# Patient Record
Sex: FEMALE | Race: WHITE | Employment: OTHER | URBAN - METROPOLITAN AREA
[De-identification: names, ages, dates, MRNs, and addresses within clinical notes are randomized per-mention and may not be internally consistent; named-entity substitution may affect disease eponyms.]

---

## 2017-02-03 ENCOUNTER — OFFICE VISIT (OUTPATIENT)
Dept: FAMILY MEDICINE | Facility: CLINIC | Age: 67
End: 2017-02-03
Payer: COMMERCIAL

## 2017-02-03 ENCOUNTER — RADIANT APPOINTMENT (OUTPATIENT)
Dept: GENERAL RADIOLOGY | Facility: CLINIC | Age: 67
End: 2017-02-03
Attending: NURSE PRACTITIONER
Payer: COMMERCIAL

## 2017-02-03 VITALS
BODY MASS INDEX: 35.62 KG/M2 | TEMPERATURE: 96.9 F | SYSTOLIC BLOOD PRESSURE: 130 MMHG | WEIGHT: 207.6 LBS | DIASTOLIC BLOOD PRESSURE: 68 MMHG | HEART RATE: 64 BPM

## 2017-02-03 DIAGNOSIS — M25.562 ACUTE PAIN OF LEFT KNEE: ICD-10-CM

## 2017-02-03 DIAGNOSIS — M25.562 ACUTE PAIN OF LEFT KNEE: Primary | ICD-10-CM

## 2017-02-03 PROCEDURE — 99213 OFFICE O/P EST LOW 20 MIN: CPT | Performed by: NURSE PRACTITIONER

## 2017-02-03 PROCEDURE — 73562 X-RAY EXAM OF KNEE 3: CPT | Mod: LT

## 2017-02-03 RX ORDER — MULTIVIT-MIN/IRON/FOLIC ACID/K 18-600-40
CAPSULE ORAL DAILY
COMMUNITY

## 2017-02-03 ASSESSMENT — ENCOUNTER SYMPTOMS
FEVER: 0
LIGHT-HEADEDNESS: 0
SHORTNESS OF BREATH: 0
COUGH: 0
CONSTIPATION: 0
EYE ITCHING: 0
NAUSEA: 0
FATIGUE: 0
RHINORRHEA: 0
EYE DISCHARGE: 0
SORE THROAT: 0
WHEEZING: 0
HEADACHES: 0
DIZZINESS: 0
DIAPHORESIS: 0
SINUS PRESSURE: 0
CHILLS: 0
DIARRHEA: 0
ARTHRALGIAS: 1

## 2017-02-03 NOTE — PATIENT INSTRUCTIONS
Knee Pain  Knee pain is very common. It s especially common in active people who put a lot of pressure on their knees, like runners. It affects women more often than men.  Your kneecap (patella) is a thick, round bone. It covers and protects the front portion of your knee joint. It moves along a groove in your thighbone (femur) as part of the patellofemoral joint. A layer of cartilage surrounds the underside of your kneecap. This layer protects it from grinding against your femur.  When this cartilage softens and breaks down, it can cause knee pain. This is partly because of repetitive stress. The stress irritates the lining of the joint. This causes pain in the underlying bone.  What causes knee pain?  Many things can cause knee pain. You may have more than one cause. Some of these include:    Overuse of the knee joint    The kneecap doesn t line up with the tissue around it    Damage to small nerves in the area    Damage to the ligament-like structure that holds the kneecap in place (retinaculum)    Breakdown of the bone under the cartilage    Swelling in the soft tissues around the kneecap    Injury  You might be more likely to have knee pain if you:    Exercise a lot    Recently increased the intensity of your workouts    Have a body mass index (BMI) greater than 25    Have poor alignment of your kneecap    Walk with your feet turned overly outward or inward    Have weakness in surrounding muscle groups (inner quad or hip adductor muscles)    Have too much tightness in surrounding muscle groups (hamstrings or iliotibial band)    Have a recent history of injury to the area    Are female  Symptoms of knee pain  This type of knee pain is a dull, aching pain in the front of the knee in the area under and around the kneecap. This pain may start quickly or slowly. Your pain might be worse when you squat, run, or sit for a long time. You might also sometimes feel like your knee is giving out. You may have symptoms in  one or both of your knees.  Diagnosing knee pain  Your health care provider will ask about your medical history and your symptoms. Be sure to describe any activities that make your knee pain worse. He or she will look at your knee. This will include tests of your range of motion, strength, and areas of pain of your knee. Your knee alignment will be checked.  Your health care provider will need to rule out other causes of your knee pain, such as arthritis. You may need an imaging test, such as an X-ray or MRI.  Treatment for knee pain  Treatments that can help ease your symptoms may include:    Avoiding activities for a while that make your pain worse, returning to activity over time    Icing the outside of your knee when it causes you pain    Taking over-the-counter pain medicine    Wearing a knee brace or taping your knee to support it    Wearing special shoe inserts to help keep your feet in the proper alignment    Doing special exercises to stretch and strengthen the muscles around your hip and your knee  These steps help most people manage knee pain. But some cases of knee pain need to be treated with surgery. You may need surgery right away. Or you may need it later if other treatments don t work. Your health care provider may refer you to an orthopedic surgeon. He or she will talk with you about your choices.  Preventing knee pain  Losing weight and correcting excess muscle tightness or muscle weakness may help lower your risk.  In some cases, you can prevent knee pain. To help prevent a flare-up of knee pain, you do these things:    Regularly do all the exercises your doctor or physical therapist advises    Support your knee as advised by your doctor or physical therapist    Increase training gradually, and ease up on training when needed    Have an expert check your gait for running or other sporting activities    Stretch properly before and after exercise    Replace your running shoes regularly    Lose  excess weight     When to call your health care provider  Call your health care provider right away if:    Your symptoms don t get better after a few weeks of treatment    You have any new symptoms        4319-4657 [employer]. [address] [place] [address], [place] [address]. All rights reserved. This information is not intended as a substitute for professional medical care. Always follow your healthcare professional's instructions.

## 2017-02-03 NOTE — NURSING NOTE
"Chief Complaint   Patient presents with     Joint Swelling     left knee       Initial /68 mmHg  Pulse 64  Temp(Src) 96.9  F (36.1  C) (Tympanic)  Wt 207 lb 9.6 oz (94.167 kg) Estimated body mass index is 35.62 kg/(m^2) as calculated from the following:    Height as of 10/17/16: 5' 4\" (1.626 m).    Weight as of this encounter: 207 lb 9.6 oz (94.167 kg).  BP completed using cuff size: tasha Morocho CMA      "

## 2017-02-03 NOTE — PROGRESS NOTES
SUBJECTIVE:                                                    Payton Gardiner is a 66 year old female who presents to clinic today for the following health issues:      Left knee pain and swelling for the last 2 weeks. Has been that way all the time. Will feel ok when is walking on treadmill it when goes to turn certain directions on bend knee to put on shoes will hurt. Does hurt to go up and down steps. Did do some ibuprofen and ice and is not sure if helped because was not consistent with it becuase  was very ill. Does hip abductor machine at [facility] and not sure if that has caused the pain. Knee does not feel weak like it could give out, has not gotten locked. No numbness or tingling. No previous injury to this area     Musculoskeletal problem/pain      Duration: 2 weeks    Description  Location: left knee    Intensity:  moderate    Accompanying signs and symptoms: swelling, creeking    History  Previous similar problem: no   Previous evaluation:  none    Precipitating or alleviating factors:  Trauma or overuse: YES- uses treadmill 30 minutes per day  Aggravating factors include: certain positions    Therapies tried and outcome: ibuprofen and ice not effective       Problem list and histories reviewed & adjusted, as indicated.  Additional history: as documented    Current Outpatient Prescriptions   Medication Sig Dispense Refill     ASPIRIN PO Take 81 mg by mouth       vitamin B complex with vitamin C (VITAMIN  B COMPLEX) TABS tablet Take 1 tablet by mouth daily       Cholecalciferol (VITAMIN D) 2000 UNITS CAPS        Calcium Carb-Cholecalciferol (CALCIUM 600 + D PO)        APPLE CIDER VINEGAR PO        Allergies   Allergen Reactions     Hmg-Coa-R Inhibitors      Could hardly walk     Sulfa Drugs Rash       ROS:  Review of Systems   Constitutional: Negative for fever, chills, diaphoresis and fatigue.   HENT: Negative for ear discharge, ear pain, hearing loss, rhinorrhea, sinus pressure and sore throat.     Eyes: Negative for discharge and itching.   Respiratory: Negative for cough, shortness of breath and wheezing.    Gastrointestinal: Negative for nausea, diarrhea and constipation.   Musculoskeletal: Positive for arthralgias (left knee).   Skin: Negative for rash.   Neurological: Negative for dizziness, light-headedness and headaches.         OBJECTIVE:                                                    /68 mmHg  Pulse 64  Temp(Src) 96.9  F (36.1  C) (Tympanic)  Wt 207 lb 9.6 oz (94.167 kg)  Body mass index is 35.62 kg/(m^2).  Physical Exam   Constitutional: She appears well-developed and well-nourished.   Cardiovascular: Normal rate and regular rhythm.    Pulmonary/Chest: Effort normal and breath sounds normal.   Musculoskeletal:        Left knee: She exhibits swelling and effusion. She exhibits normal range of motion, no ecchymosis, no erythema and no bony tenderness. Tenderness found.   Tenderness to posterior left knee.    Neurological: She is alert.   Skin: Skin is warm and dry.         Diagnostic Test Results:  Xray - Left knee     ASSESSMENT/PLAN:                                                      1. Acute pain of left knee  Obtain imaging  Ibuprofen as needed to help  pain  May need referral to ortho in the future.   - XR Knee Left 3 Views; Future        [facility]

## 2017-02-03 NOTE — MR AVS SNAPSHOT
After Visit Summary   2/3/2017    Payton Gardiner    MRN: [id number]           Patient Information     Date Of Birth          1950        Visit Information        Provider Department      2/3/2017 10:00 AM Homa Fish APRN [facility]        Today's Diagnoses     Acute pain of left knee    -  1       Care Instructions      Knee Pain  Knee pain is very common. It s especially common in active people who put a lot of pressure on their knees, like runners. It affects women more often than men.  Your kneecap (patella) is a thick, round bone. It covers and protects the front portion of your knee joint. It moves along a groove in your thighbone (femur) as part of the patellofemoral joint. A layer of cartilage surrounds the underside of your kneecap. This layer protects it from grinding against your femur.  When this cartilage softens and breaks down, it can cause knee pain. This is partly because of repetitive stress. The stress irritates the lining of the joint. This causes pain in the underlying bone.  What causes knee pain?  Many things can cause knee pain. You may have more than one cause. Some of these include:    Overuse of the knee joint    The kneecap doesn t line up with the tissue around it    Damage to small nerves in the area    Damage to the ligament-like structure that holds the kneecap in place (retinaculum)    Breakdown of the bone under the cartilage    Swelling in the soft tissues around the kneecap    Injury  You might be more likely to have knee pain if you:    Exercise a lot    Recently increased the intensity of your workouts    Have a body mass index (BMI) greater than 25    Have poor alignment of your kneecap    Walk with your feet turned overly outward or inward    Have weakness in surrounding muscle groups (inner quad or hip adductor muscles)    Have too much tightness in surrounding muscle groups (hamstrings or iliotibial band)    Have a recent  history of injury to the area    Are female  Symptoms of knee pain  This type of knee pain is a dull, aching pain in the front of the knee in the area under and around the kneecap. This pain may start quickly or slowly. Your pain might be worse when you squat, run, or sit for a long time. You might also sometimes feel like your knee is giving out. You may have symptoms in one or both of your knees.  Diagnosing knee pain  Your health care provider will ask about your medical history and your symptoms. Be sure to describe any activities that make your knee pain worse. He or she will look at your knee. This will include tests of your range of motion, strength, and areas of pain of your knee. Your knee alignment will be checked.  Your health care provider will need to rule out other causes of your knee pain, such as arthritis. You may need an imaging test, such as an X-ray or MRI.  Treatment for knee pain  Treatments that can help ease your symptoms may include:    Avoiding activities for a while that make your pain worse, returning to activity over time    Icing the outside of your knee when it causes you pain    Taking over-the-counter pain medicine    Wearing a knee brace or taping your knee to support it    Wearing special shoe inserts to help keep your feet in the proper alignment    Doing special exercises to stretch and strengthen the muscles around your hip and your knee  These steps help most people manage knee pain. But some cases of knee pain need to be treated with surgery. You may need surgery right away. Or you may need it later if other treatments don t work. Your health care provider may refer you to an orthopedic surgeon. He or she will talk with you about your choices.  Preventing knee pain  Losing weight and correcting excess muscle tightness or muscle weakness may help lower your risk.  In some cases, you can prevent knee pain. To help prevent a flare-up of knee pain, you do these  things:    Regularly do all the exercises your doctor or physical therapist advises    Support your knee as advised by your doctor or physical therapist    Increase training gradually, and ease up on training when needed    Have an expert check your gait for running or other sporting activities    Stretch properly before and after exercise    Replace your running shoes regularly    Lose excess weight     When to call your health care provider  Call your health care provider right away if:    Your symptoms don t get better after a few weeks of treatment    You have any new symptoms        [id number] [employer]. [address] [place] [address]. All rights reserved. This information is not intended as a substitute for professional medical care. Always follow your healthcare professional's instructions.              Follow-ups after your visit        Your next 10 appointments already scheduled     Apr 24, 2017 10:15 AM   [place] SCREENING DIGITAL BILATERAL with [id number]   [facility] ([facility])    [address] [place] [id number] [phone]           Do not use any powder, lotion or deodorant under your arms or on your breast. If you do, we will ask you to remove it before your exam.  Wear comfortable, two-piece clothing.  If you have any allergies, tell your care team.  Bring any previous mammograms from other facilities or have them mailed to the breast center.            May 01, 2017  8:20 AM   PHYSICAL with Araseli Frazier,    [facility]    [address] [facility] [address] [phone]              Future tests that were ordered for you today     Open Future Orders        Priority Expected Expires Ordered    XR Knee Left 3 Views Routine 2/3/2017 2/3/2018 2/3/2017            Who to contact     Normal or non-critical lab and imaging results will be communicated to you by MyChart, letter or phone  within 4 business days after the clinic has received the results. If you do not hear from us within 7 days, please contact the clinic through [facility] or phone. If you have a critical or abnormal lab result, we will notify you by phone as soon as possible.  Submit refill requests through L[facility]N or call your pharmacy and they will forward the refill request to us. Please allow 3 business days for your refill to be completed.          If you need to speak with a  for additional information , please call: [phone]           Additional Information About Your Visit        LYYN Information     [facility]YN gives you secure access to your electronic health record. If you see a primary care provider, you can also send messages to your care team and make appointments. If you have questions, please call your primary care clinic.  If you do not have a primary care provider, please call [phone] and they will assist you.        Care EveryWhere ID     This is your Care EveryWhere ID. This could be used by other organizations to access your [facility] medical records  [id number]        Your Vitals Were     Pulse Temperature                64 96.9  F (36.1  C) (Tympanic)           Blood Pressure from Last 3 Encounters:   02/03/17 130/68   10/17/16 124/88    Weight from Last 3 Encounters:   02/03/17 207 lb 9.6 oz (94.167 kg)   10/17/16 199 lb (90.266 kg)               Primary Care Provider    None Specified       No primary provider on file.        Thank you!     Thank you for choosing [facility]  for your care. Our goal is always to provide you with excellent care. Hearing back from our patients is one way we can continue to improve our services. Please take a few minutes to complete the written survey that you may receive in the mail after your visit with us. Thank you!             Your Updated Medication List - Protect others around you: Learn how to safely use, store and throw away your  medicines at www.disposemymeds.org.          This list is accurate as of: 2/3/17 10:24 AM.  Always use your most recent med list.                   Brand Name Dispense Instructions for use    APPLE CIDER VINEGAR PO          ASPIRIN PO      Take 81 mg by mouth       CALCIUM 600 + D PO          vitamin B complex with vitamin C Tabs tablet      Take 1 tablet by mouth daily       vitamin D 2000 UNITS Caps

## 2017-02-13 DIAGNOSIS — M25.562 ACUTE PAIN OF LEFT KNEE: Primary | ICD-10-CM

## 2017-02-15 DIAGNOSIS — L63.9 ALOPECIA AREATA: ICD-10-CM

## 2017-02-15 DIAGNOSIS — R92.8 ABNORMAL MAMMOGRAM: ICD-10-CM

## 2017-02-15 DIAGNOSIS — M22.2X9 PATELLOFEMORAL PAIN SYNDROME, UNSPECIFIED LATERALITY: ICD-10-CM

## 2017-02-15 DIAGNOSIS — S63.509A SPRAIN OF WRIST: ICD-10-CM

## 2017-02-15 PROBLEM — N60.02 BREAST CYST, LEFT: Status: ACTIVE | Noted: 2017-02-15

## 2017-02-15 PROBLEM — H26.9 CATARACTS, BILATERAL: Status: ACTIVE | Noted: 2017-02-15

## 2017-02-15 NOTE — PROGRESS NOTES
Received records form [facility] via fax.    Please abstract the following data from this visit with this patient into the appropriate field in Epic:    Colonoscopy done on this date: 8/3/2016 (approximately), by this group: [facility], results were incomplete-could not safely advance beyond sigmoid. 8/4/16-[facility]. Tortuous colon.  External and internal hemorrhoids.  The exam was otherwise normal.  No specimens collected. Repeat colonoscopy in 5 years for screening.     Mammogram done on this date: 4/22/2016 (approximately), by this group: [facility], results were There are scattered areas of fibroglandular density. Findings: No suspicious masses, architectural distortion, skin thickening, nipple retraction, or suspicious calcifications are appreciated. Impression:  No suspicious radiographic findings, Routine follow up recommended with mammogram in 1 year.       Eye exam with ophthalmology on this date: 7/21/16      Ultrasound Carotid Arteries Bilateral: Date 4/28/16.  Findings:  Peak systolic velocity within the internal carotid artery on the right and left is 73 and 70 cm/sec respectively.  Peak systolic velocity within the common carotid artery on the right and left is 79 and 80 cm/sec respectively.  The ratio using peak systolic velocity in the ICA/CCA is 1.0 on th right and 0.9 on the left.  No calcified plaque of significance is seen. Antegrade flow in the vertebral arteries is present.  Impression: The degree of stenosis within the internal arteries is less than 50% bilaterally.    Lab work 6/6/16  WBC 8.9 K/uL  RBC 4.43 M/uL  HGB 13.7 g/dL  HCT 40.4%  MCV 91.2 fL  MCH 30.9 pg  MCHC 33.9 g/dL  RDW 12.7 %  Platelets 249 K/uL  Soidum 139 mEq/L  Potassium 3.8 mEq/L  Chloride 104 mEq/L  C02 29 mEq/L  Glucose 105 mg/dL  BUN 19 mg/dL  Creatinine 0.63 mg/dl  Calcium 9.5 mg/dL    Allergies, problem list and histories have been added.      Records have been  placed into scans.    Belgica Morocho CMA

## 2017-02-17 ENCOUNTER — OFFICE VISIT (OUTPATIENT)
Dept: ORTHOPEDICS | Facility: CLINIC | Age: 67
End: 2017-02-17
Payer: COMMERCIAL

## 2017-02-17 ENCOUNTER — RADIANT APPOINTMENT (OUTPATIENT)
Dept: MRI IMAGING | Facility: CLINIC | Age: 67
End: 2017-02-17
Attending: FAMILY MEDICINE
Payer: COMMERCIAL

## 2017-02-17 VITALS
BODY MASS INDEX: 35.44 KG/M2 | SYSTOLIC BLOOD PRESSURE: 153 MMHG | WEIGHT: 207.6 LBS | HEART RATE: 119 BPM | DIASTOLIC BLOOD PRESSURE: 72 MMHG | HEIGHT: 64 IN

## 2017-02-17 DIAGNOSIS — M23.92 LOCKING OF LEFT KNEE: ICD-10-CM

## 2017-02-17 DIAGNOSIS — M25.562 ACUTE PAIN OF LEFT KNEE: ICD-10-CM

## 2017-02-17 DIAGNOSIS — M25.562 ACUTE PAIN OF LEFT KNEE: Primary | ICD-10-CM

## 2017-02-17 PROCEDURE — 73721 MRI JNT OF LWR EXTRE W/O DYE: CPT | Mod: TC

## 2017-02-17 PROCEDURE — 99203 OFFICE O/P NEW LOW 30 MIN: CPT | Performed by: FAMILY MEDICINE

## 2017-02-17 RX ORDER — LORAZEPAM 0.5 MG/1
0.25-0.5 TABLET ORAL PRN
Qty: 4 TABLET | Refills: 0 | Status: SHIPPED | OUTPATIENT
Start: 2017-02-17 | End: 2017-05-01

## 2017-02-17 NOTE — MR AVS SNAPSHOT
After Visit Summary   2017    Payton Gardiner    MRN: [id number]           Patient Information     Date Of Birth          1950        Visit Information        Provider Department      2017 10:40 AM Luiz Acevedo,  [facility]        Today's Diagnoses     Acute pain of left knee    -  1    Locking of left knee          Care Instructions    Plan:  Prescription Medication as directed: Lorazepam 30 minutes prior to MRI  MRI of the left knee  Follow up: after MRI    If you have any further questions for your physician or physician s care team you can call [phone] and use option 3 to leave a voice message. Calls received during business hours will be returned same day.    Advanced imaging is done by appointment in the imaging center.  Depending on your availability you can usually schedule within the next 1-2 days. Some insurance require a prior authorization to be completed which may delay the time until you are able to schedule your appointment.  Please call [facility] [phone] to schedule your MRI.     Please make a follow up appointment in the clinic at least 2 days following your MRI by calling [phone].          Follow-ups after your visit        Your next 10 appointments already scheduled     2017 10:15 AM CDT   MA SCREENING DIGITAL BILATERAL with [address] ([facility])    [address] [facility] [place] [address] [phone]           Do not use any powder, lotion or deodorant under your arms or on your breast. If you do, we will ask you to remove it before your exam.  Wear comfortable, two-piece clothing.  If you have any allergies, tell your care team.  Bring any previous mammograms from other facilities or have them mailed to the breast center.            May 01, 2017  8:20 AM CDT   PHYSICAL with Araseli Frazier,    [facility]  [place]    [address] [facility] [address]   [phone]              Future tests that were ordered for you today     Open Future Orders        Priority Expected Expires Ordered    MR Knee Left w/o Contrast Routine  2/17/2018 2/17/2017            Who to contact     If you have questions or need follow up information about today's clinic visit or your schedule please contact [facility] directly at [phone].  Normal or non-critical lab and imaging results will be communicated to you by Dwllrhart, letter or phone within 4 business days after the clinic has received the results. If you do not hear from us within 7 days, please contact the clinic through Quixbyt or phone. If you have a critical or abnormal lab result, we will notify you by phone as soon as possible.  Submit refill requests through RSens or call your pharmacy and they will forward the refill request to us. Please allow 3 business days for your refill to be completed.          Additional Information About Your Visit        [employer] Information     RSens gives you secure access to your electronic health record. If you see a primary care provider, you can also send messages to your care team and make appointments. If you have questions, please call your primary care clinic.  If you do not have a primary care provider, please call [phone] and they will assist you.        Care EveryWhere ID     This is your Care EveryWhere ID. This could be used by other organizations to access your [facility] medical records  [id number]        Your Vitals Were     Pulse Height BMI (Body Mass Index)             119 5' 4\" (1.626 m) 35.63 kg/m2          Blood Pressure from Last 3 Encounters:   02/17/17 153/72   02/03/17 130/68   10/17/16 124/88    Weight from Last 3 Encounters:   02/17/17 207 lb 9.6 oz (94.2 kg)   02/03/17 207 lb 9.6 oz (94.2 kg)   10/17/16 199 lb (90.3 kg)                 Today's Medication " Changes          These changes are accurate as of: 2/17/17 11:49 AM.  If you have any questions, ask your nurse or doctor.               Start taking these medicines.        Dose/Directions    LORazepam 0.5 MG tablet   Commonly known as:  ATIVAN   Used for:  Acute pain of left knee, Locking of left knee   Started by:  Luiz Acevedo,         Dose:  0.25-0.5 mg   Take 0.5-1 tablets (0.25-0.5 mg) by mouth as needed for anxiety Take 30 minutes prior to procedure.  Do not operate a vehicle after taking this medication   Quantity:  4 tablet   Refills:  0            Where to get your medicines      Some of these will need a paper prescription and others can be bought over the counter.  Ask your nurse if you have questions.     Bring a paper prescription for each of these medications     LORazepam 0.5 MG tablet                Primary Care Provider    None Specified       No primary provider on file.        Thank you!     Thank you for choosing [facility]  for your care. Our goal is always to provide you with excellent care. Hearing back from our patients is one way we can continue to improve our services. Please take a few minutes to complete the written survey that you may receive in the mail after your visit with us. Thank you!             Your Updated Medication List - Protect others around you: Learn how to safely use, store and throw away your medicines at www.disposemymeds.org.          This list is accurate as of: 2/17/17 11:49 AM.  Always use your most recent med list.                   Brand Name Dispense Instructions for use    APPLE CIDER VINEGAR PO          ASPIRIN PO      Take 81 mg by mouth       CALCIUM 600 + D PO          LORazepam 0.5 MG tablet    ATIVAN    4 tablet    Take 0.5-1 tablets (0.25-0.5 mg) by mouth as needed for anxiety Take 30 minutes prior to procedure.  Do not operate a vehicle after taking this medication       vitamin B complex with vitamin C Tabs  tablet      Take 1 tablet by mouth daily       vitamin D 2000 UNITS Caps

## 2017-02-17 NOTE — PROGRESS NOTES
"Payton Gardiner  :  1950  DOS: 2017  MRN: [id number]    Sports Medicine Clinic Visit    PCP: No primary care provider on file.    Payton Gardiner is a 67 year old female who is seen in consultation at the request of Homa Fish presenting with left knee pain    Injury: 3-4 week(s).  Pain located over posterior knee, thigh and calf, radiating to posterior thigh and calf.  Additional Features:  Positive: swelling.  Symptoms are better with Standing.  Symptoms are worse with: everything else: walking, sitting, exercise, turning, putting on shoes.  Other evaluation and/or treatments so far consists of: Ice and Ibuprofen.  Recent imaging completed: X-rays completed 2/3/16.  Prior History of related problems: none    Social History: retired    Review of Systems  Musculoskeletal: as above  Remainder of review of systems is negative including constitutional, CV, pulmonary, GI, Skin and Neurologic except as noted in HPI or medical history.    History reviewed. No pertinent past medical history.  Past Surgical History   Procedure Laterality Date     Hysterectomy       took ovaries and cervix as well, removed due to cyst (benign)     Cataract iol, rt/lt       Colonoscopy  2016     incomplete     Colonoscopy  2016     Dexa - him scan  2015     Excise breast cyst/fibroadenoma/tumor/duct lesion/nipple lesion/areolar lesion Left 2001     left breast cyst aspiration       Objective  /72  Pulse 119  Ht 5' 4\" (1.626 m)  Wt 207 lb 9.6 oz (94.2 kg)  BMI 35.63 kg/m2    General: healthy, alert and in no distress    HEENT: no scleral icterus or conjunctival erythema   Skin: no suspicious lesions or rash. No jaundice.   CV: regular rhythm by palpation, 2+ distal pulses, no pedal edema    Resp: normal respiratory effort without conversational dyspnea   Psych: normal mood and affect    Gait: antalgic, appropriate coordination and balance, using assistive devie  Neuro: normal light touch sensory exam " of the extremities. Motor strength as noted below     Right Knee exam    ROM:        Flexion 110 degrees       Extension -2 degrees       Range of motion limited by pain, mechanical    Inspection:       no visible ecchymosis        effusion noted trace    Skin:       no visible deformities       well perfused       capillary refill brisk    Patellar Motion:        Crepitus noted in the patellofemoral joint    Tender:        medial patellar border       lateral patellar border       medial joint line       lateral joint line    Non Tender:         remainder of knee area        along MCL        distal IT Band        infrapatellar tendon        tibial tubercle       pes anserine bursa    Special Tests:        neg (-) varus at 0 deg and 30 deg       neg (-) valgus at 0 deg and 30 deg    Evaluation of ipsilateral kinetic chain       decreased strength with resisted abduction of the right hip       decreased strength with resisted extension of the right hip       B/l decreased quad tone and core deconditioning      Radiology  LEFT KNEE THREE VIEWS 2/3/2017 10:55 AM      HISTORY: Pain in left knee.     COMPARISON: None.     FINDINGS: Very minimal hypertrophic changes medial compartment left  knee as well as at the patellofemoral articulation. Vascular  calcification. Tiny cortical sclerotic lesion distal femur anteriorly  of doubtful significance.    Assessment:  1. Acute pain of left knee    2. Locking of left knee        Plan:  Discussed the assessment with the patient.  Follow up: will contact with MR results  Would normally offer CSI for her pain  However, given very limited ROM and mechanical sx, will eval for unstable meniscus  Only mild OA apparent on XR, so arthroscopy may be reasonable for unstable meniscus issue  No baker's cyst of knee  Bedside US shows pes anserine bursitis, mild knee effusion  Will ensure stability of knee before pursuing injection options  WBAT for now, ESTEBAN reviewed, assistive device use  reviewed  PT avalable in the future as well  We discussed modified progressive pain-free activity as tolerated  Home handouts provided and supportive care reviewed  All questions were answered today  Contact us with additional questions or concerns  Signs and sx of concern reviewed    Thanks very much for sending this nice lady to us, I will keep you updated with her progress      Luiz Acevedo DO, CAQ  Primary Care Sports Medicine  [facility]       Disclaimer: This note consists of symbols derived from keyboarding, dictation and/or voice recognition software. As a result, there may be errors in the script that have gone undetected. Please consider this when interpreting information found in this chart.

## 2017-02-17 NOTE — PATIENT INSTRUCTIONS
Plan:  Prescription Medication as directed: Lorazepam 30 minutes prior to MRI  MRI of the left knee  Follow up: after MRI    If you have any further questions for your physician or physician s care team you can call [phone] and use option 3 to leave a voice message. Calls received during business hours will be returned same day.    Advanced imaging is done by appointment in the imaging center.  Depending on your availability you can usually schedule within the next 1-2 days. Some insurance require a prior authorization to be completed which may delay the time until you are able to schedule your appointment.  Please call [facility] [phone] to schedule your MRI.     Please make a follow up appointment in the clinic at least 2 days following your MRI by calling [phone].

## 2017-02-17 NOTE — NURSING NOTE
"Chief Complaint   Patient presents with     Knee left       Initial /72  Pulse 119  Ht 5' 4\" (1.626 m)  Wt 207 lb 9.6 oz (94.2 kg)  BMI 35.63 kg/m2 Estimated body mass index is 35.63 kg/(m^2) as calculated from the following:    Height as of this encounter: 5' 4\" (1.626 m).    Weight as of this encounter: 207 lb 9.6 oz (94.2 kg).  Medication Reconciliation: complete    "

## 2017-02-20 ENCOUNTER — MYC MEDICAL ADVICE (OUTPATIENT)
Dept: ORTHOPEDICS | Facility: CLINIC | Age: 67
End: 2017-02-20

## 2017-02-20 NOTE — TELEPHONE ENCOUNTER
Discussed MR results.  Doing better overall. Reassuring MR from standpoint of internal derangement and stability.  OA/chondromalacia reviewed.  Can offer CSI vs PT for labile or worsening sx.  Call to discuss any time prn.  All questions asnwered today.

## 2017-02-20 NOTE — TELEPHONE ENCOUNTER
MR KNEE LEFT WITHOUT CONTRAST 2/17/2017 12:45 PM     HISTORY: Pain in left knee. Unspecified internal derangement of left  knee.     TECHNIQUE: Axial and coronal T2 with fat suppression. Coronal T1.  Sagittal dual echo T2.     FINDINGS:   Medial Meniscus: Asymmetric intrasubstance degeneration of the medial  meniscus with no definite articular surface tear identified, using  strict criteria.      Lateral Meniscus: No tear, displaced fragment, or extrusion.      Anterior Cruciate Ligament: Intact.      Posterior Cruciate Ligament: Intact.      Medial Collateral Ligament: Intact.     Lateral Collateral Ligament Complex, Popliteus Tendon: The iliotibial  band, fibular collateral ligament, biceps femoris tendon, and  popliteus tendon are intact.     Osseous and Cartilaginous Structures: Mild resorptive change adjacent  to the tibial spine region. There is chondromalacia as follows: Grade  2 lateral femoral condyle, at least grade 1 lateral and medial tibial  plateau, patellofemoral with grade 4 involvement.     Extensor Mechanism: The quadriceps and patellar tendons are intact.  The medial and lateral patellar retinacula appear unremarkable.     Joint Space: Mild-moderate joint effusion. No definite loose bodies  appreciated.     Additional Findings: No Baker's cyst. Mild semimembranosus-tibial  collateral ligament bursitis. There is also some focal fluid deep to  the pes anserine tendons at the posteromedial aspect of the knee,  suspicious for pes anserine bursitis.         IMPRESSION:  1. Three-compartment chondromalacia.  2. Mild-moderate effusion.  3. Posteromedial bursitis.

## 2017-03-15 ENCOUNTER — OFFICE VISIT (OUTPATIENT)
Dept: ORTHOPEDICS | Facility: CLINIC | Age: 67
End: 2017-03-15
Payer: COMMERCIAL

## 2017-03-15 VITALS
SYSTOLIC BLOOD PRESSURE: 133 MMHG | HEART RATE: 69 BPM | WEIGHT: 203.4 LBS | DIASTOLIC BLOOD PRESSURE: 67 MMHG | HEIGHT: 64 IN | BODY MASS INDEX: 34.72 KG/M2

## 2017-03-15 DIAGNOSIS — M17.11 PRIMARY OSTEOARTHRITIS OF RIGHT KNEE: ICD-10-CM

## 2017-03-15 DIAGNOSIS — M25.469 EFFUSION OF LOWER LEG JOINT: ICD-10-CM

## 2017-03-15 DIAGNOSIS — M25.562 ACUTE PAIN OF LEFT KNEE: Primary | ICD-10-CM

## 2017-03-15 PROCEDURE — 99213 OFFICE O/P EST LOW 20 MIN: CPT | Mod: 25 | Performed by: FAMILY MEDICINE

## 2017-03-15 PROCEDURE — 20611 DRAIN/INJ JOINT/BURSA W/US: CPT | Mod: RT | Performed by: FAMILY MEDICINE

## 2017-03-15 RX ORDER — TRIAMCINOLONE ACETONIDE 40 MG/ML
40 INJECTION, SUSPENSION INTRA-ARTICULAR; INTRAMUSCULAR ONCE
Qty: 1 ML | Refills: 0 | OUTPATIENT
Start: 2017-03-15 | End: 2017-03-15

## 2017-03-15 NOTE — PROGRESS NOTES
"Payton Gardiner  :  1950  DOS: 3/15/2017  MRN: [id number]    Sports Medicine Clinic Visit  Interim History - March 15, 2017  Since last visit on 2017 patient has had significant improvement. She has significant fluid on the knee. She is not getting much relief with ice, she has stopped the ibuprofen. Increased aching with standing, activity, and being on her feet for long periods of time. No interim injury.         Initial Visit: Payton Gardiner is a 67 year old female who is seen in consultation at the request of Homa Fish presenting with left knee pain    Injury: 3-4 week(s).  Pain located over posterior knee, thigh and calf, radiating to posterior thigh and calf.  Additional Features:  Positive: swelling.  Symptoms are better with Standing.  Symptoms are worse with: everything else: walking, sitting, exercise, turning, putting on shoes.  Other evaluation and/or treatments so far consists of: Ice and Ibuprofen.  Recent imaging completed: X-rays completed 2/3/16.  Prior History of related problems: none    Social History: retired    Review of Systems  Musculoskeletal: as above  Remainder of review of systems is negative including constitutional, CV, pulmonary, GI, Skin and Neurologic except as noted in HPI or medical history.    No past medical history on file.  Past Surgical History   Procedure Laterality Date     Hysterectomy       took ovaries and cervix as well, removed due to cyst (benign)     Cataract iol, rt/lt       Colonoscopy  2016     incomplete     Colonoscopy  2016     Dexa - him scan  2015     Excise breast cyst/fibroadenoma/tumor/duct lesion/nipple lesion/areolar lesion Left 2001     left breast cyst aspiration       Objective  /67  Pulse 69  Ht 5' 4\" (1.626 m)  Wt 203 lb 6.4 oz (92.3 kg)  BMI 34.91 kg/m2    General: healthy, alert and in no distress    HEENT: no scleral icterus or conjunctival erythema   Skin: no suspicious lesions or rash. No jaundice. "   CV: regular rhythm by palpation, 2+ distal pulses, no pedal edema    Resp: normal respiratory effort without conversational dyspnea   Psych: normal mood and affect    Gait: antalgic, appropriate coordination and balance, using assistive devie  Neuro: normal light touch sensory exam of the extremities. Motor strength as noted below     Right Knee exam    ROM:        Flexion 140 degrees       Extension 0 degrees       Range of motion limited by pain, mechanical    Inspection:       no visible ecchymosis        effusion noted small    Skin:       no visible deformities       well perfused       capillary refill brisk    Patellar Motion:        Crepitus noted in the patellofemoral joint    Tender:        medial patellar border       lateral patellar border       medial joint line       lateral joint line      All improved    Non Tender:         remainder of knee area        along MCL        distal IT Band        infrapatellar tendon        tibial tubercle       pes anserine bursa    Special Tests:        neg (-) varus at 0 deg and 30 deg       neg (-) valgus at 0 deg and 30 deg       + PF grind    Evaluation of ipsilateral kinetic chain       decreased strength with resisted abduction of the right hip       decreased strength with resisted extension of the right hip       B/l decreased quad tone and core deconditioning    Sports Medicine Clinic Procedure  Ultrasound Guided Right Intra-Articular Knee Aspiration & Injection    Technique: The risks of the procedure were explained to the patient.  A consent was signed for the intra-articular knee procedure.  The patient was evaluated with a [employer] ultrasound machine using a 12 MHz linear probe.     The Right knee was prepped and draped in a sterile manner.  Ultrasound identification of the patella, suprapatellar pouch, quadriceps tendon and femur in both long and short axis. The probe was placed in short axis to the Right femur.  A 1.5 inch 18 gauge needle was placed  under ultrasound guidance into the superior knee joint.  15 ml of clear yellow colored fluid was aspirated.   A mixture of 2 ml's 1% lidocaine, 2ml's 0.5% marcaine, and 1 ml kenalog (40mg/ml) was injected without difficulty. The needle was removed and there was good hemostasis without complications.  There was ultrasound documentation of needle placement and injection.  Pre-procedural pain 5/10.  Post procedural pain 2/10.      Radiology  LEFT KNEE THREE VIEWS 2/3/2017 10:55 AM      HISTORY: Pain in left knee.     COMPARISON: None.     FINDINGS: Very minimal hypertrophic changes medial compartment left  knee as well as at the patellofemoral articulation. Vascular  calcification. Tiny cortical sclerotic lesion distal femur anteriorly  of doubtful significance.    Assessment:  1. Acute pain of left knee    2. Primary osteoarthritis of right knee    3. Effusion of lower leg joint        Plan:  Discussed the assessment with the patient.  Follow up: prn  Reviewed MR results  Aspiration and injection today to help with facilitating further clinical progress  Improved ROM and pain, but still with moderate pain and effusion  Improved pes anserine bursitis  PT avalable in the future as well  Reviewed wt loss, activity modification and progressive increase in activity as tolerated and guided by pain  Reviewed options for potential steroid vs viscosupplementation injections and the possibility for future orthopedic referral prn  Reviewed safe and appropriate OTC medication choices, try tylenol first  Up to 3000mg daily of tylenol is generally safe, NSAID dosing and duration limitations reviewed  Discussed nature of degenerative arthrosis of the knee.   Discussed symptom treatment with ice or heat, topical treatments, and rest if needed.   Expectations and goals of CSI reviewed  Often 2-3 days for steroid effect, and can take up to two weeks for maximum effect  We discussed modified progressive pain-free activity as  tolerated  Do not overuse in first two weeks if feeling better due to concern for vulnerability while steroid is working  Supportive care reviewed  All questions were answered today  Contact us with additional questions or concerns  Signs and sx of concern reviewed      Luiz Acevedo DO, RYAN  Primary Care Sports Medicine  [facility]       Disclaimer: This note consists of symbols derived from keyboarding, dictation and/or voice recognition software. As a result, there may be errors in the script that have gone undetected. Please consider this when interpreting information found in this chart.

## 2017-03-15 NOTE — MR AVS SNAPSHOT
After Visit Summary   3/15/2017    Payton Gardiner    MRN: [id number]           Patient Information     Date Of Birth          1950        Visit Information        Provider Department      3/15/2017 9:00 AM Luiz Acevedo,  [facility]        Today's Diagnoses     Acute pain of left knee    -  1    Primary osteoarthritis of right knee        Effusion of lower leg joint           Follow-ups after your visit        Your next 10 appointments already scheduled     Apr 24, 2017 10:15 AM CDT   MA SCREENING DIGITAL BILATERAL with WYMA2   [facility] ([facility])    [address] [id number] [phone]           Do not use any powder, lotion or deodorant under your arms or on your breast. If you do, we will ask you to remove it before your exam.  Wear comfortable, two-piece clothing.  If you have any allergies, tell your care team.  Bring any previous mammograms from other facilities or have them mailed to the breast center.            May 01, 2017  8:20 AM CDT   PHYSICAL with Araseli Frazier DO   [facility]    [address] [address] [phone]              Who to contact     If you have questions or need follow up information about today's clinic visit or your schedule please contact [facility] directly at [phone].  Normal or non-critical lab and imaging results will be communicated to you by MyChart, letter or phone within 4 business days after the clinic has received the results. If you do not hear from us within 7 days, please contact the clinic through MyChart or phone. If you have a critical or abnormal lab result, we will notify you by phone as soon as possible.  Submit refill requests through Propel or call your pharmacy and they will forward the refill request to us. Please allow 3 business days for  "your refill to be completed.          Additional Information About Your Visit        Feedskyhart Information     [employer] gives you secure access to your electronic health record. If you see a primary care provider, you can also send messages to your care team and make appointments. If you have questions, please call your primary care clinic.  If you do not have a primary care provider, please call [phone] and they will assist you.        Care EveryWhere ID     This is your Care EveryWhere ID. This could be used by other organizations to access your [facility] medical records  [id number]        Your Vitals Were     Pulse Height BMI (Body Mass Index)             69 5' 4\" (1.626 m) 34.91 kg/m2          Blood Pressure from Last 3 Encounters:   03/15/17 133/67   02/17/17 153/72   02/03/17 130/68    Weight from Last 3 Encounters:   03/15/17 203 lb 6.4 oz (92.3 kg)   02/17/17 207 lb 9.6 oz (94.2 kg)   02/03/17 207 lb 9.6 oz (94.2 kg)              We Performed the Following     HC ARTHROCENTESIS ASPIR&/INJ MAJOR JT/BURSA W/US     TRIAMCINOLONE ACET INJ NOS          Today's Medication Changes          These changes are accurate as of: 3/15/17  9:32 AM.  If you have any questions, ask your nurse or doctor.               Start taking these medicines.        Dose/Directions    triamcinolone acetonide 40 MG/ML injection   Commonly known as:  KENALOG   Used for:  Acute pain of left knee, Primary osteoarthritis of right knee, Effusion of lower leg joint        Dose:  40 mg   1 mL (40 mg) by INTRA-ARTICULAR route once for 1 dose   Quantity:  1 mL   Refills:  0            Where to get your medicines      Some of these will need a paper prescription and others can be bought over the counter.  Ask your nurse if you have questions.     You don't need a prescription for these medications     triamcinolone acetonide 40 MG/ML injection                Primary Care Provider    None Specified       No primary provider on file.        Thank " you!     Thank you for choosing [facility]  for your care. Our goal is always to provide you with excellent care. Hearing back from our patients is one way we can continue to improve our services. Please take a few minutes to complete the written survey that you may receive in the mail after your visit with us. Thank you!             Your Updated Medication List - Protect others around you: Learn how to safely use, store and throw away your medicines at www.disposemymeds.org.          This list is accurate as of: 3/15/17  9:32 AM.  Always use your most recent med list.                   Brand Name Dispense Instructions for use    APPLE CIDER VINEGAR PO          ASPIRIN PO      Take 81 mg by mouth       CALCIUM 600 + D PO          LORazepam 0.5 MG tablet    ATIVAN    4 tablet    Take 0.5-1 tablets (0.25-0.5 mg) by mouth as needed for anxiety Take 30 minutes prior to procedure.  Do not operate a vehicle after taking this medication       triamcinolone acetonide 40 MG/ML injection    KENALOG    1 mL    1 mL (40 mg) by INTRA-ARTICULAR route once for 1 dose       vitamin B complex with vitamin C Tabs tablet      Take 1 tablet by mouth daily       vitamin D 2000 UNITS Caps

## 2017-03-15 NOTE — NURSING NOTE
"Chief Complaint   Patient presents with     Knee left       Initial Ht 5' 4\" (1.626 m)  Wt 203 lb 6.4 oz (92.3 kg)  BMI 34.91 kg/m2 Estimated body mass index is 34.91 kg/(m^2) as calculated from the following:    Height as of this encounter: 5' 4\" (1.626 m).    Weight as of this encounter: 203 lb 6.4 oz (92.3 kg).  Medication Reconciliation: complete     Anne Marie Jules M.Ed., ATC      "

## 2017-05-01 ENCOUNTER — OFFICE VISIT (OUTPATIENT)
Dept: FAMILY MEDICINE | Facility: CLINIC | Age: 67
End: 2017-05-01
Payer: COMMERCIAL

## 2017-05-01 VITALS
WEIGHT: 200.8 LBS | SYSTOLIC BLOOD PRESSURE: 132 MMHG | DIASTOLIC BLOOD PRESSURE: 78 MMHG | HEART RATE: 80 BPM | TEMPERATURE: 97.6 F | HEIGHT: 64 IN | BODY MASS INDEX: 34.28 KG/M2

## 2017-05-01 DIAGNOSIS — Z13.29 SCREENING FOR THYROID DISORDER: ICD-10-CM

## 2017-05-01 DIAGNOSIS — E78.5 HYPERLIPIDEMIA, UNSPECIFIED HYPERLIPIDEMIA TYPE: ICD-10-CM

## 2017-05-01 DIAGNOSIS — Z00.00 MEDICARE ANNUAL WELLNESS VISIT, SUBSEQUENT: Primary | ICD-10-CM

## 2017-05-01 DIAGNOSIS — Z12.31 ENCOUNTER FOR SCREENING MAMMOGRAM FOR BREAST CANCER: ICD-10-CM

## 2017-05-01 DIAGNOSIS — M81.0 OSTEOPOROSIS: ICD-10-CM

## 2017-05-01 DIAGNOSIS — R73.09 ABNORMAL GLUCOSE: ICD-10-CM

## 2017-05-01 LAB
ANION GAP SERPL CALCULATED.3IONS-SCNC: 7 MMOL/L (ref 3–14)
BUN SERPL-MCNC: 20 MG/DL (ref 7–30)
CALCIUM SERPL-MCNC: 9.3 MG/DL (ref 8.5–10.1)
CHLORIDE SERPL-SCNC: 104 MMOL/L (ref 94–109)
CHOLEST SERPL-MCNC: 243 MG/DL
CO2 SERPL-SCNC: 28 MMOL/L (ref 20–32)
CREAT SERPL-MCNC: 0.78 MG/DL (ref 0.52–1.04)
GFR SERPL CREATININE-BSD FRML MDRD: 73 ML/MIN/1.7M2
GLUCOSE SERPL-MCNC: 92 MG/DL (ref 70–99)
HBA1C MFR BLD: 5.2 % (ref 4.3–6)
HDLC SERPL-MCNC: 87 MG/DL
LDLC SERPL CALC-MCNC: 133 MG/DL
NONHDLC SERPL-MCNC: 156 MG/DL
POTASSIUM SERPL-SCNC: 4 MMOL/L (ref 3.4–5.3)
SODIUM SERPL-SCNC: 139 MMOL/L (ref 133–144)
TRIGL SERPL-MCNC: 113 MG/DL
TSH SERPL DL<=0.005 MIU/L-ACNC: 2.85 MU/L (ref 0.4–4)

## 2017-05-01 PROCEDURE — 80048 BASIC METABOLIC PNL TOTAL CA: CPT | Performed by: FAMILY MEDICINE

## 2017-05-01 PROCEDURE — 99397 PER PM REEVAL EST PAT 65+ YR: CPT | Performed by: FAMILY MEDICINE

## 2017-05-01 PROCEDURE — 83036 HEMOGLOBIN GLYCOSYLATED A1C: CPT | Performed by: FAMILY MEDICINE

## 2017-05-01 PROCEDURE — 36415 COLL VENOUS BLD VENIPUNCTURE: CPT | Performed by: FAMILY MEDICINE

## 2017-05-01 PROCEDURE — 84443 ASSAY THYROID STIM HORMONE: CPT | Performed by: FAMILY MEDICINE

## 2017-05-01 PROCEDURE — 80061 LIPID PANEL: CPT | Performed by: FAMILY MEDICINE

## 2017-05-01 NOTE — PATIENT INSTRUCTIONS
I'll let you know your lab results when they are available.    You can call [phone] to schedule both the mammogram and the bone density scan at the same time.    Please let me know if you need anything.    Preventive Health Recommendations    Female Ages 65 +    Yearly exam:     See your health care provider every year in order to  o Review health changes.   o Discuss preventive care.    o Review your medicines if your doctor has prescribed any.      You no longer need a yearly Pap test unless you've had an abnormal Pap test in the past 10 years. If you have vaginal symptoms, such as bleeding or discharge, be sure to talk with your provider about a Pap test.      Every 1 to 2 years, have a mammogram.  If you are over 69, talk with your health care provider about whether or not you want to continue having screening mammograms.      Every 10 years, have a colonoscopy. Or, have a yearly FIT test (stool test). These exams will check for colon cancer.       Have a cholesterol test every 5 years, or more often if your doctor advises it.       Have a diabetes test (fasting glucose) every three years. If you are at risk for diabetes, you should have this test more often.       At age 65, have a bone density scan (DEXA) to check for osteoporosis (brittle bone disease).    Shots:    Get a flu shot each year.    Get a tetanus shot every 10 years.    Talk to your doctor about your pneumonia vaccines. There are now two you should receive - Pneumovax (PPSV 23) and Prevnar (PCV 13).    Talk to your doctor about the shingles vaccine.    Talk to your doctor about the hepatitis B vaccine.    Nutrition:     Eat at least 5 servings of fruits and vegetables each day.      Eat whole-grain bread, whole-wheat pasta and brown rice instead of white grains and rice.      Talk to your provider about Calcium and Vitamin D.     Lifestyle    Exercise at least 150 minutes a week (30 minutes a day, 5 days a week). This will help you control  your weight and prevent disease.      Limit alcohol to one drink per day.      No smoking.       Wear sunscreen to prevent skin cancer.       See your dentist twice a year for an exam and cleaning.      See your eye doctor every 1 to 2 years to screen for conditions such as glaucoma, macular degeneration and cataracts.

## 2017-05-01 NOTE — NURSING NOTE
"Initial /78 (BP Location: Right arm, Cuff Size: Adult Large)  Pulse 80  Temp 97.6  F (36.4  C) (Tympanic)  Ht 5' 3.5\" (1.613 m)  Wt 200 lb 12.8 oz (91.1 kg)  Breastfeeding? No  BMI 35.01 kg/m2 Estimated body mass index is 35.01 kg/(m^2) as calculated from the following:    Height as of this encounter: 5' 3.5\" (1.613 m).    Weight as of this encounter: 200 lb 12.8 oz (91.1 kg). .      "

## 2017-05-01 NOTE — MR AVS SNAPSHOT
After Visit Summary   5/1/2017    Payton Gardiner    MRN: [id number]           Patient Information     Date Of Birth          1950        Visit Information        Provider Department      5/1/2017 8:20 AM Araseli Frazier DO [facility]        Today's Diagnoses     Medicare annual wellness visit, subsequent    -  1    Hyperlipidemia, unspecified hyperlipidemia type        Osteoporosis        Encounter for screening mammogram for breast cancer        Abnormal glucose          Care Instructions    I'll let you know your lab results when they are available.    You can call [phone] to schedule both the mammogram and the bone density scan at the same time.    Please let me know if you need anything.    Preventive Health Recommendations    Female Ages 65 +    Yearly exam:     See your health care provider every year in order to  o Review health changes.   o Discuss preventive care.    o Review your medicines if your doctor has prescribed any.      You no longer need a yearly Pap test unless you've had an abnormal Pap test in the past 10 years. If you have vaginal symptoms, such as bleeding or discharge, be sure to talk with your provider about a Pap test.      Every 1 to 2 years, have a mammogram.  If you are over 69, talk with your health care provider about whether or not you want to continue having screening mammograms.      Every 10 years, have a colonoscopy. Or, have a yearly FIT test (stool test). These exams will check for colon cancer.       Have a cholesterol test every 5 years, or more often if your doctor advises it.       Have a diabetes test (fasting glucose) every three years. If you are at risk for diabetes, you should have this test more often.       At age 65, have a bone density scan (DEXA) to check for osteoporosis (brittle bone disease).    Shots:    Get a flu shot each year.    Get a tetanus shot every 10 years.    Talk to your doctor about your pneumonia  vaccines. There are now two you should receive - Pneumovax (PPSV 23) and Prevnar (PCV 13).    Talk to your doctor about the shingles vaccine.    Talk to your doctor about the hepatitis B vaccine.    Nutrition:     Eat at least 5 servings of fruits and vegetables each day.      Eat whole-grain bread, whole-wheat pasta and brown rice instead of white grains and rice.      Talk to your provider about Calcium and Vitamin D.     Lifestyle    Exercise at least 150 minutes a week (30 minutes a day, 5 days a week). This will help you control your weight and prevent disease.      Limit alcohol to one drink per day.      No smoking.       Wear sunscreen to prevent skin cancer.       See your dentist twice a year for an exam and cleaning.      See your eye doctor every 1 to 2 years to screen for conditions such as glaucoma, macular degeneration and cataracts.        Follow-ups after your visit        Future tests that were ordered for you today     Open Future Orders        Priority Expected Expires Ordered    DX Hip/Pelvis/Spine Routine  5/1/2018 5/1/2017            Who to contact     Normal or non-critical lab and imaging results will be communicated to you by TrustCloudt, letter or phone within 4 business days after the clinic has received the results. If you do not hear from us within 7 days, please contact the clinic through Destineer or phone. If you have a critical or abnormal lab result, we will notify you by phone as soon as possible.  Submit refill requests through [employer] or call your pharmacy and they will forward the refill request to us. Please allow 3 business days for your refill to be completed.          If you need to speak with a  for additional information , please call: [phone]           Additional Information About Your Visit        Destineer Information     [employer] gives you secure access to your electronic health record. If you see a primary care provider, you can also send messages to  "your care team and make appointments. If you have questions, please call your primary care clinic.  If you do not have a primary care provider, please call [phone] and they will assist you.        Care EveryWhere ID     This is your Care EveryWhere ID. This could be used by other organizations to access your [place] medical records  [id number]        Your Vitals Were     Pulse Temperature Height Breastfeeding? BMI (Body Mass Index)       80 97.6  F (36.4  C) (Tympanic) 5' 3.5\" (1.613 m) No 35.01 kg/m2        Blood Pressure from Last 3 Encounters:   05/01/17 132/78   03/15/17 133/67   02/17/17 153/72    Weight from Last 3 Encounters:   05/01/17 200 lb 12.8 oz (91.1 kg)   03/15/17 203 lb 6.4 oz (92.3 kg)   02/17/17 207 lb 9.6 oz (94.2 kg)              We Performed the Following     Basic metabolic panel     Hemoglobin A1c     Lipid Profile with reflex to direct LDL        Primary Care Provider Office Phone # Fax #    Araseli Xiao DO Freddie [phone]       [address]   [place] [address]        Thank you!     Thank you for choosing [facility]  for your care. Our goal is always to provide you with excellent care. Hearing back from our patients is one way we can continue to improve our services. Please take a few minutes to complete the written survey that you may receive in the mail after your visit with us. Thank you!             Your Updated Medication List - Protect others around you: Learn how to safely use, store and throw away your medicines at www.disposemymeds.org.          This list is accurate as of: 5/1/17  8:58 AM.  Always use your most recent med list.                   Brand Name Dispense Instructions for use    APPLE CIDER VINEGAR PO          ASPIRIN PO      Take 81 mg by mouth       CALCIUM 600 + D PO          vitamin B complex with vitamin C Tabs tablet      Take 1 tablet by mouth daily       vitamin D 2000 UNITS Caps            "

## 2017-05-01 NOTE — PROGRESS NOTES
SUBJECTIVE:                                                            Payton Gardiner is a 67 year old female who presents for Preventive Visit.    Are you in the first 12 months of your Medicare Part B coverage?  No    Healthy Habits:    Do you get at least three servings of calcium containing foods daily (dairy, green leafy vegetables, etc.)? no, taking calcium and/or vitamin D supplement: yes -     Amount of exercise or daily activities, outside of work: 5 day(s) per week    Problems taking medications regularly No    Medication side effects: No    Have you had an eye exam in the past two years? yes    Do you see a dentist twice per year? yes    Do you have sleep apnea, excessive snoring or daytime drowsiness?no    COGNITIVE SCREEN  1) Repeat 3 items (Banana, Sunrise, Chair)    2) Clock draw: NORMAL  3) 3 item recall: Recalls 2 objects   Results: NORMAL clock, 1-2 items recalled: COGNITIVE IMPAIRMENT LESS LIKELY    Mini-CogTM Copyright S Janak. Licensed by the author for use in [facility]; reprinted with permission (beronica@[facility]). All rights reserved.        No concerns     Reviewed and updated as needed this visit by clinical staff  Tobacco  Allergies  Meds  Med Hx  Surg Hx  Fam Hx  Soc Hx        Reviewed and updated as needed this visit by Provider  Tobacco  Med Hx  Surg Hx  Fam Hx  Soc Hx       Social History   Substance Use Topics     Smoking status: Former Smoker     Smokeless tobacco: Not on file      Comment: couple of cigs per day for about 30 years.     Alcohol use 0.0 oz/week     0 Standard drinks or equivalent per week      Comment: socially       The patient does not drink >3 drinks per day nor >7 drinks per week.    Today's PHQ-2 Score:   PHQ-2 ( 1999 Pfizer) 5/1/2017   Q1: Little interest or pleasure in doing things 0   Q2: Feeling down, depressed or hopeless 0   PHQ-2 Score 0       Do you feel safe in your environment - Yes    Do you have a Health Care Directive?: Yes:  "Patient states has Advance Directive and will bring in a copy to clinic.    Current providers sharing in care for this patient include:   Patient Care Team:  Araseli Frazier DO as PCP - General (Family Practice)      Hearing impairment: No    Ability to successfully perform activities of daily living: Yes, no assistance needed     Fall risk:  Fallen 2 or more times in the past year?: No  Any fall with injury in the past year?: No    Home safety:  none identified      The following health maintenance items are reviewed in Epic and correct as of today:  Health Maintenance   Topic Date Due     HEPATITIS C SCREENING  02/11/1966     LIPID SCREEN Q5 YR FEMALE (SYSTEM ASSIGNED)  02/11/1993     FALL RISK ASSESSMENT  02/11/2013     INFLUENZA VACCINE (SYSTEM ASSIGNED)  09/01/2017     MAMMO SCREEN Q2 YR (SYSTEM ASSIGNED)  04/22/2018     ADVANCE DIRECTIVE PLANNING Q5 YRS (NO INBASKET)  09/24/2020     TETANUS IMMUNIZATION (SYSTEM ASSIGNED)  01/10/2021     COLONOSCOPY Q5 YR INBASKET MESSAGE  08/04/2021     DEXA SCAN SCREENING (SYSTEM ASSIGNED)  Completed     PNEUMOCOCCAL  Completed         Mammogram Screening: Patient over age 50, mutual decision to screen reflected in health maintenance.     ROS:  Constitutional, HEENT, cardiovascular, pulmonary, gi and gu systems are negative, except as otherwise noted.    Problem list, Medication list, Allergies, and Medical/Social/Surgical histories reviewed in [employer] and updated as appropriate.  OBJECTIVE:                                                            /78 (BP Location: Right arm, Cuff Size: Adult Large)  Pulse 80  Temp 97.6  F (36.4  C) (Tympanic)  Ht 5' 3.5\" (1.613 m)  Wt 200 lb 12.8 oz (91.1 kg)  Breastfeeding? No  BMI 35.01 kg/m2 Estimated body mass index is 35.01 kg/(m^2) as calculated from the following:    Height as of this encounter: 5' 3.5\" (1.613 m).    Weight as of this encounter: 200 lb 12.8 oz (91.1 kg).  EXAM:   GENERAL: healthy, alert and no " distress  EYES: Eyes grossly normal to inspection, PERRL and conjunctivae and sclerae normal  HENT: ear canals and TM's normal, nose and mouth without ulcers or lesions  NECK: no adenopathy, no asymmetry, masses, or scars and thyroid normal to palpation  RESP: lungs clear to auscultation - no rales, rhonchi or wheezes  BREAST: normal without masses, tenderness or nipple discharge and no palpable axillary masses or adenopathy  CV: regular rate and rhythm, normal S1 S2, no S3 or S4, no murmur, click or rub, no peripheral edema and peripheral pulses strong  ABDOMEN: soft, nontender, no hepatosplenomegaly, no masses and bowel sounds normal  MS: no gross musculoskeletal defects noted, no edema  SKIN: no suspicious lesions or rashes  NEURO: Normal strength and tone, mentation intact and speech normal  PSYCH: mentation appears normal, affect normal/bright    ASSESSMENT / PLAN:                                                                ICD-10-CM    1. Medicare annual wellness visit, subsequent Z00.00    2. Hyperlipidemia, unspecified hyperlipidemia type E78.5 Lipid Profile with reflex to direct LDL   3. Osteoporosis M81.0 DX Hip/Pelvis/Spine   4. Encounter for screening mammogram for breast cancer Z12.31    5. Abnormal glucose R73.09 Basic metabolic panel     Hemoglobin A1c       End of Life Planning:  Patient currently has an advanced directive: Yes.  Practitioner is supportive of decision.    COUNSELING:  Reviewed preventive health counseling, as reflected in patient instructions  Special attention given to:       Regular exercise       Healthy diet/nutrition       Osteoporosis Prevention/Bone Health       Colon cancer screening    BP Screening:   Last 3 BP Readings:    BP Readings from Last 3 Encounters:   05/01/17 132/78   03/15/17 133/67   02/17/17 153/72       The following was recommended to the patient:  Re-screen BP within a year and recommended lifestyle modifications    Estimated body mass index is 35.01  "kg/(m^2) as calculated from the following:    Height as of this encounter: 5' 3.5\" (1.613 m).    Weight as of this encounter: 200 lb 12.8 oz (91.1 kg).  Weight management plan: Discussed healthy diet and exercise guidelines and patient will follow up in 12 months in clinic to re-evaluate.   reports that she has quit smoking. She does not have any smokeless tobacco history on file.      Appropriate preventive services were discussed with this patient, including applicable screening as appropriate for cardiovascular disease, diabetes, osteopenia/osteoporosis, and glaucoma.  As appropriate for age/gender, discussed screening for colorectal cancer, prostate cancer, breast cancer, and cervical cancer. Checklist reviewing preventive services available has been given to the patient.    Reviewed patients plan of care and provided an AVS. The Basic Care Plan (routine screening as documented in Health Maintenance) for Payton meets the Care Plan requirement. This Care Plan has been established and reviewed with the Patient.    Counseling Resources:  ATP IV Guidelines  Pooled Cohorts Equation Calculator  Breast Cancer Risk Calculator  FRAX Risk Assessment  ICSI Preventive Guidelines  Dietary Guidelines for Americans, 2010  USDA's MyPlate  ASA Prophylaxis  Lung CA Screening    Araseli Frazier, DO  [facility]  "

## 2017-06-01 ENCOUNTER — HOSPITAL ENCOUNTER (OUTPATIENT)
Dept: BONE DENSITY | Facility: CLINIC | Age: 67
End: 2017-06-01
Attending: FAMILY MEDICINE
Payer: MEDICARE

## 2017-06-01 ENCOUNTER — HOSPITAL ENCOUNTER (OUTPATIENT)
Dept: MAMMOGRAPHY | Facility: CLINIC | Age: 67
Discharge: HOME | End: 2017-06-01
Attending: FAMILY MEDICINE | Admitting: FAMILY MEDICINE
Payer: MEDICARE

## 2017-06-01 DIAGNOSIS — M81.0 OSTEOPOROSIS: ICD-10-CM

## 2017-06-01 DIAGNOSIS — Z12.31 ENCOUNTER FOR SCREENING MAMMOGRAM FOR BREAST CANCER: ICD-10-CM

## 2017-06-01 PROCEDURE — G0202 SCR MAMMO BI INCL CAD: HCPCS

## 2017-06-01 PROCEDURE — 77080 DXA BONE DENSITY AXIAL: CPT

## 2017-06-01 PROCEDURE — 77063 BREAST TOMOSYNTHESIS BI: CPT

## 2017-07-13 ENCOUNTER — OFFICE VISIT (OUTPATIENT)
Dept: FAMILY MEDICINE | Facility: CLINIC | Age: 67
End: 2017-07-13
Payer: COMMERCIAL

## 2017-07-13 VITALS
RESPIRATION RATE: 16 BRPM | TEMPERATURE: 97.8 F | BODY MASS INDEX: 35.48 KG/M2 | OXYGEN SATURATION: 98 % | WEIGHT: 207.8 LBS | HEART RATE: 62 BPM | HEIGHT: 64 IN | DIASTOLIC BLOOD PRESSURE: 76 MMHG | SYSTOLIC BLOOD PRESSURE: 128 MMHG
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
## 2017-07-13 PROCEDURE — 99213 OFFICE O/P EST LOW 20 MIN: CPT | Performed by: NURSE PRACTITIONER

## 2017-07-13 NOTE — PROGRESS NOTES
"  SUBJECTIVE:                                                    Payton Gardiner is a 67 year old female who presents to clinic today for the following health issues:      Tick bite      Duration: 5 days ago    Description (location/character/radiation): outer aspect of left breast    Intensity:  moderate    Accompanying signs and symptoms: red, pruritis, hasn't changed in size. Denies warmth or drainage. Is really itchy. Is not getting larger. Tick was attached < 12 hours. Was a \"medium\" size tick - she is unsure of kind of tick.     History (similar episodes/previous evaluation): has been bitten by aa tick before and was very sick. Had anaplasmosis.     Precipitating or alleviating factors: none    Therapies tried and outcome: Neosporin       Problem list and histories reviewed & adjusted, as indicated.  Additional history: as documented    Patient Active Problem List   Diagnosis     Cataracts, bilateral     Alopecia areata     Breast cyst, left     Fibrocystic breast changes     Abnormal mammogram     Greater trochanteric bursitis, right     HCD (health care directive)     Hyperlipidemia     Low back pain     Osteoporosis     Patellofemoral syndrome     Postmenopausal     Sprain of wrist     Vitamin D deficiency     Abnormal glucose     Past Surgical History:   Procedure Laterality Date     CATARACT IOL, RT/LT       COLONOSCOPY  08/03/2016    incomplete     COLONOSCOPY  08/04/2016     DEXA - HIM SCAN  05/13/2015     EXCISE BREAST CYST/FIBROADENOMA/TUMOR/DUCT LESION/NIPPLE LESION/AREOLAR LESION Left 05/24/2001    left breast cyst aspiration     HYSTERECTOMY  1998    took ovaries and cervix as well, removed due to cyst (benign)       Social History   Substance Use Topics     Smoking status: Former Smoker     Smokeless tobacco: Not on file      Comment: couple of cigs per day for about 30 years.     Alcohol use 0.0 oz/week     0 Standard drinks or equivalent per week      Comment: socially     Family History   Problem " "Relation Age of Onset     Hypertension Mother      Coronary Artery Disease Mother      Dementia Mother      Parkinsonism Mother      Other - See Comments Mother      lewy body disease     Macular Degeneration Mother      Cataracts Mother      Colon Cancer Father      age 93     Stomach Cancer Father      Breast Cancer Sister      Thyroid Cancer Sister      Brain Tumor Sister      Cataracts Sister      Tumor Sister      non-malignant brain tumor     Pancreatic Cancer Other      DIABETES No family hx of            Reviewed and updated as needed this visit by clinical staff       Reviewed and updated as needed this visit by Provider         ROS:  Constitutional, HEENT, cardiovascular, pulmonary, gi and gu systems are negative, except as otherwise noted.    OBJECTIVE:     /76 (BP Location: Left arm, Patient Position: Sitting, Cuff Size: Adult Large)  Pulse 62  Temp 97.8  F (36.6  C) (Tympanic)  Resp 16  Ht 5' 3.5\" (1.613 m)  Wt 207 lb 12.8 oz (94.3 kg)  SpO2 98%  BMI 36.23 kg/m2  Body mass index is 36.23 kg/(m^2).  GENERAL: healthy, alert and no distress  MS: no gross musculoskeletal defects noted, no edema  SKIN: 10 mm round vascular lesion to left lateral breast. Non-palpable, does not joyce. No central clearing. No warmth or drainage. Scabbed puncture in center.   NEURO: Normal strength and tone, mentation intact and speech normal  PSYCH: mentation appears normal, affect normal/bright    Diagnostic Test Results:  none     ASSESSMENT/PLAN:       ICD-10-CM    1. Tick bite, initial encounter W57.XXXA Lyme Confirm IgG by Immunoblot       FURTHER TESTING:       - Schedule lab appointment in 9 days- at 2 week laura for Lyme's screen. If negative she may re-screen at 4 weeks.     FUTURE APPOINTMENTS:       - Follow-up visit in 2 weeks PRN persistent rash, sooner PRN new or worsening symptoms, such as fever, joint pain or swelling, headaches, enlarging rash, weakness etc. Discussed with patient that the tick was " not likely to be attached long enough to transfer disease. The size of the tick does not sound consistent with a deer tick. She is asymptomatic. The rash is not growing as would be expected with erythema migrans and the itching is more consistent with a local allergic reaction. Suggested she trial Hydrocortisone cream OTC.     Patient Instructions     Tick Bite (No Antibiotics)    You have been bitten by a tick. Ticks are small arachnids that feed on the blood of rodents, rabbits, birds, deer, dogs, and humans. The bite may cause a small local reaction like that of a spider, with a small amount of local redness, itching and slight swelling. Sometimes there is no local reaction.  Most tick bites are harmless. But some ticks carry diseases, such as Lyme disease or Lalo Mountain spotted fever. These can be passed to people at the time of the bite. Lyme disease is of greatest concern. Right now you have no symptoms of Lyme disease or other serious reaction to the bite. It is important to watch for the warning signs, which could appear weeks to months after the tick bite.  Home care  The following will help you care for your bite at home:    If itching is a problem, avoid tight clothing and anything that heats up your skin. This includes hot showers or baths and direct sunlight. This will tend to make the itching worse.    An ice pack will reduce local areas of redness and itching. Make your own ice pack by putting ice cubes in a zip-top plastic bag and wrapping it in a thin towel. Creams containing benzocaine will reduce itching. These creams are available over the counter.    You can use an antihistamine with diphenhydramine if your doctor did not give you another antihistamine. This medicine may be used to reduce itching if large areas of the skin are involved. It is available at drugstores and groceries. If symptoms continue, talk with your doctor or pharmacist about over-the-counter medicines.  Follow-up  care  Follow up with your healthcare provider, or as advised.  When to seek medical advice  Call your healthcare provider right away if any of these occur:  Signs of local infection. Watch for these during the next few days.    Increasing redness around the bite site    Increased pain or swelling    Fever over 100.4 F (38.0 C), or as directed by your healthcare provider    Fluid draining from the bite area  Signs of tick-related disease. Watch for these during the next few weeks to months.    Circular, red, ring-like rash appears at the bite area within 1 to 3 weeks    A non-itchy red rash develops on your wrists or ankles and spreads. It may become purple (petechiae).    Red eyes    Tiredness, fever or chills, nausea or vomiting    Neck pain or stiffness, headache, or confusion    Muscle or bone aches    Irregular or rapid heartbeat    Joint pain or swelling, especially in the knee    Numbness, tingling, or weakness in the arms or legs    Weakness on one side of the face  Date Last Reviewed: 10/1/2016    3212-2544 [employer]. [address] [place] [address]. All rights reserved. This information is not intended as a substitute for professional medical care. Always follow your healthcare professional's instructions.            BRADEN Melendez [facility]

## 2017-07-13 NOTE — MR AVS SNAPSHOT
After Visit Summary   7/13/2017    Payton Gardiner    MRN: [id number]           Patient Information     Date Of Birth          1950        Visit Information        Provider Department      7/13/2017 10:40 AM Janett Choi APRN [facility]        Today's Diagnoses     Tick bite, initial encounter    -  1      Care Instructions      Tick Bite (No Antibiotics)    You have been bitten by a tick. Ticks are small arachnids that feed on the blood of rodents, rabbits, birds, deer, dogs, and humans. The bite may cause a small local reaction like that of a spider, with a small amount of local redness, itching and slight swelling. Sometimes there is no local reaction.  Most tick bites are harmless. But some ticks carry diseases, such as Lyme disease or Lalo Mountain spotted fever. These can be passed to people at the time of the bite. Lyme disease is of greatest concern. Right now you have no symptoms of Lyme disease or other serious reaction to the bite. It is important to watch for the warning signs, which could appear weeks to months after the tick bite.  Home care  The following will help you care for your bite at home:    If itching is a problem, avoid tight clothing and anything that heats up your skin. This includes hot showers or baths and direct sunlight. This will tend to make the itching worse.    An ice pack will reduce local areas of redness and itching. Make your own ice pack by putting ice cubes in a zip-top plastic bag and wrapping it in a thin towel. Creams containing benzocaine will reduce itching. These creams are available over the counter.    You can use an antihistamine with diphenhydramine if your doctor did not give you another antihistamine. This medicine may be used to reduce itching if large areas of the skin are involved. It is available at drugstores and groceries. If symptoms continue, talk with your doctor or pharmacist about over-the-counter  medicines.  Follow-up care  Follow up with your healthcare provider, or as advised.  When to seek medical advice  Call your healthcare provider right away if any of these occur:  Signs of local infection. Watch for these during the next few days.    Increasing redness around the bite site    Increased pain or swelling    Fever over 100.4 F (38.0 C), or as directed by your healthcare provider    Fluid draining from the bite area  Signs of tick-related disease. Watch for these during the next few weeks to months.    Circular, red, ring-like rash appears at the bite area within 1 to 3 weeks    A non-itchy red rash develops on your wrists or ankles and spreads. It may become purple (petechiae).    Red eyes    Tiredness, fever or chills, nausea or vomiting    Neck pain or stiffness, headache, or confusion    Muscle or bone aches    Irregular or rapid heartbeat    Joint pain or swelling, especially in the knee    Numbness, tingling, or weakness in the arms or legs    Weakness on one side of the face  Date Last Reviewed: 10/1/2016    0270-4906 [employer]. [address] [place] [address]. All rights reserved. This information is not intended as a substitute for professional medical care. Always follow your healthcare professional's instructions.                Follow-ups after your visit        Future tests that were ordered for you today     Open Future Orders        Priority Expected Expires Ordered    Lyme Confirm IgG by Immunoblot Routine  7/13/2018 7/13/2017            Who to contact     Normal or non-critical lab and imaging results will be communicated to you by MyChart, letter or phone within 4 business days after the clinic has received the results. If you do not hear from us within 7 days, please contact the clinic through MyChart or phone. If you have a critical or abnormal lab result, we will notify you by phone as soon as possible.  Submit refill requests through [employer] or call your  "pharmacy and they will forward the refill request to us. Please allow 3 business days for your refill to be completed.          If you need to speak with a  for additional information , please call: [phone]           Additional Information About Your Visit        MyChart Information     [employer] gives you secure access to your electronic health record. If you see a primary care provider, you can also send messages to your care team and make appointments. If you have questions, please call your primary care clinic.  If you do not have a primary care provider, please call [phone] and they will assist you.        Care EveryWhere ID     This is your Care EveryWhere ID. This could be used by other organizations to access your [place] medical records  [id number]        Your Vitals Were     Pulse Temperature Respirations Height Pulse Oximetry BMI (Body Mass Index)    62 97.8  F (36.6  C) (Tympanic) 16 5' 3.5\" (1.613 m) 98% 36.23 kg/m2       Blood Pressure from Last 3 Encounters:   07/13/17 128/76   05/01/17 132/78   03/15/17 133/67    Weight from Last 3 Encounters:   07/13/17 207 lb 12.8 oz (94.3 kg)   05/01/17 200 lb 12.8 oz (91.1 kg)   03/15/17 203 lb 6.4 oz (92.3 kg)               Primary Care Provider Office Phone # Fax #    Araseli Xiao DO Freddie [phone]       [place] [address] [place] [address] [address] [place] [address]        Equal Access to Services     [facility] AH: Hadii aad ku hadasho Soomaali, waaxda luqadaha, qaybta kaalmada adeegyada, waxay joy mendoza. So [facility] [phone].    ATENCIÓN: Si habla español, tiene a patten disposición servicios gratuitos de asistencia lingüística. Llame al [phone].    We comply with applicable federal civil rights laws and [place] laws. We do not discriminate on the basis of race, color, national origin, age, disability sex, sexual orientation or gender identity.            Thank you!     Thank you for choosing " [facility]  for your care. Our goal is always to provide you with excellent care. Hearing back from our patients is one way we can continue to improve our services. Please take a few minutes to complete the written survey that you may receive in the mail after your visit with us. Thank you!             Your Updated Medication List - Protect others around you: Learn how to safely use, store and throw away your medicines at www.disposemymeds.org.          This list is accurate as of: 7/13/17 11:00 AM.  Always use your most recent med list.                   Brand Name Dispense Instructions for use Diagnosis    APPLE CIDER VINEGAR PO           ASPIRIN PO      Take 81 mg by mouth        CALCIUM 600 + D PO           vitamin B complex with vitamin C Tabs tablet      Take 1 tablet by mouth daily        vitamin D 2000 UNITS Caps

## 2017-07-13 NOTE — NURSING NOTE
"Chief Complaint   Patient presents with     Insect Bites     tick       Initial /76 (BP Location: Left arm, Patient Position: Sitting, Cuff Size: Adult Large)  Pulse 98  Temp 97.8  F (36.6  C) (Tympanic)  Resp 16  Ht 5' 3.5\" (1.613 m)  Wt 207 lb 12.8 oz (94.3 kg)  SpO2 (!) 62%  BMI 36.23 kg/m2 Estimated body mass index is 36.23 kg/(m^2) as calculated from the following:    Height as of this encounter: 5' 3.5\" (1.613 m).    Weight as of this encounter: 207 lb 12.8 oz (94.3 kg).  Medication Reconciliation: complete  "

## 2017-07-13 NOTE — PATIENT INSTRUCTIONS
Tick Bite (No Antibiotics)    You have been bitten by a tick. Ticks are small arachnids that feed on the blood of rodents, rabbits, birds, deer, dogs, and humans. The bite may cause a small local reaction like that of a spider, with a small amount of local redness, itching and slight swelling. Sometimes there is no local reaction.  Most tick bites are harmless. But some ticks carry diseases, such as Lyme disease or Lalo Mountain spotted fever. These can be passed to people at the time of the bite. Lyme disease is of greatest concern. Right now you have no symptoms of Lyme disease or other serious reaction to the bite. It is important to watch for the warning signs, which could appear weeks to months after the tick bite.  Home care  The following will help you care for your bite at home:    If itching is a problem, avoid tight clothing and anything that heats up your skin. This includes hot showers or baths and direct sunlight. This will tend to make the itching worse.    An ice pack will reduce local areas of redness and itching. Make your own ice pack by putting ice cubes in a zip-top plastic bag and wrapping it in a thin towel. Creams containing benzocaine will reduce itching. These creams are available over the counter.    You can use an antihistamine with diphenhydramine if your doctor did not give you another antihistamine. This medicine may be used to reduce itching if large areas of the skin are involved. It is available at drugstores and groceries. If symptoms continue, talk with your doctor or pharmacist about over-the-counter medicines.  Follow-up care  Follow up with your healthcare provider, or as advised.  When to seek medical advice  Call your healthcare provider right away if any of these occur:  Signs of local infection. Watch for these during the next few days.    Increasing redness around the bite site    Increased pain or swelling    Fever over 100.4 F (38.0 C), or as directed by your  healthcare provider    Fluid draining from the bite area  Signs of tick-related disease. Watch for these during the next few weeks to months.    Circular, red, ring-like rash appears at the bite area within 1 to 3 weeks    A non-itchy red rash develops on your wrists or ankles and spreads. It may become purple (petechiae).    Red eyes    Tiredness, fever or chills, nausea or vomiting    Neck pain or stiffness, headache, or confusion    Muscle or bone aches    Irregular or rapid heartbeat    Joint pain or swelling, especially in the knee    Numbness, tingling, or weakness in the arms or legs    Weakness on one side of the face  Date Last Reviewed: 10/1/2016    0201-3490 [employer]. [address] [place] [address], [place] [address]. All rights reserved. This information is not intended as a substitute for professional medical care. Always follow your healthcare professional's instructions.
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
## 2017-08-01 PROCEDURE — 86617 LYME DISEASE ANTIBODY: CPT | Mod: 90 | Performed by: NURSE PRACTITIONER

## 2017-08-01 PROCEDURE — 99000 SPECIMEN HANDLING OFFICE-LAB: CPT | Performed by: NURSE PRACTITIONER

## 2017-08-01 PROCEDURE — 86618 LYME DISEASE ANTIBODY: CPT | Performed by: NURSE PRACTITIONER

## 2017-08-01 PROCEDURE — 36415 COLL VENOUS BLD VENIPUNCTURE: CPT | Performed by: NURSE PRACTITIONER

## 2017-08-02 LAB — B BURGDOR IGG+IGM SER QL: 0.1 (ref 0–0.89)

## 2017-08-04 LAB — B BURGDOR IGG SER QL IB: NORMAL

## 2017-12-05 ENCOUNTER — OFFICE VISIT (OUTPATIENT)
Dept: FAMILY MEDICINE | Facility: CLINIC | Age: 67
End: 2017-12-05
Payer: COMMERCIAL

## 2017-12-05 VITALS
SYSTOLIC BLOOD PRESSURE: 116 MMHG | DIASTOLIC BLOOD PRESSURE: 64 MMHG | TEMPERATURE: 97.4 F | HEART RATE: 70 BPM | BODY MASS INDEX: 35.58 KG/M2 | HEIGHT: 64 IN | WEIGHT: 208.4 LBS

## 2017-12-05 DIAGNOSIS — Z11.59 NEED FOR HEPATITIS C SCREENING TEST: Primary | ICD-10-CM

## 2017-12-05 DIAGNOSIS — H00.015 HORDEOLUM EXTERNUM OF LEFT LOWER EYELID: ICD-10-CM

## 2017-12-05 PROBLEM — E66.01 MORBID OBESITY (H): Status: ACTIVE | Noted: 2017-12-05

## 2017-12-05 PROCEDURE — 86803 HEPATITIS C AB TEST: CPT | Performed by: FAMILY MEDICINE

## 2017-12-05 PROCEDURE — 36415 COLL VENOUS BLD VENIPUNCTURE: CPT | Performed by: FAMILY MEDICINE

## 2017-12-05 PROCEDURE — 99213 OFFICE O/P EST LOW 20 MIN: CPT | Performed by: FAMILY MEDICINE

## 2017-12-05 RX ORDER — ERYTHROMYCIN 5 MG/G
0.25 OINTMENT OPHTHALMIC 3 TIMES DAILY
Qty: 1 G | Refills: 0 | Status: SHIPPED | OUTPATIENT
Start: 2017-12-05 | End: 2017-12-06

## 2017-12-05 NOTE — NURSING NOTE
"Chief Complaint   Patient presents with     Eye Problem       Initial /64 (BP Location: Left arm, Patient Position: Sitting, Cuff Size: Adult Large)  Pulse 70  Temp 97.4  F (36.3  C) (Tympanic)  Ht 5' 3.5\" (1.613 m)  Wt 208 lb 6.4 oz (94.5 kg)  BMI 36.34 kg/m2 Estimated body mass index is 36.34 kg/(m^2) as calculated from the following:    Height as of this encounter: 5' 3.5\" (1.613 m).    Weight as of this encounter: 208 lb 6.4 oz (94.5 kg).  Medication Reconciliation: complete   Nathalia Ayala CMA  "

## 2017-12-05 NOTE — PROGRESS NOTES
SUBJECTIVE:   Payton Gardiner is a 67 year old female who presents to clinic today for the following health issues:      Eye(s) Problem  Onset: three weeks    Description:   Location: left  Pain: YES  Redness: YES    Accompanying Signs & Symptoms:  Discharge/mattering: YES- there was some this morning  Swelling: YES  Visual changes: no   Fever: no   Nasal Congestion: no   Bothered by bright lights: no     History:   Trauma: no   Foreign body exposure: no     Precipitating factors:   Wearing contacts: no     Alleviating factors:  Improved by: hot pack feels good    Therapies Tried and outcome: sty medication and hot packs.     No trauma or injury.  No visual changes. No UPPER RESPIRATORY INFECTION symptoms.  No fever. Not spreading.  Has had prior stye. No new topicals or drops.  She has had regular eye exams.  Her last one was about a year ago.  She denies any trauma she denies any cold symptoms nor fever chills.  She denies cough or shortness of breath.  She has had no other skin lesions or rash.  Warm packs seem to help.  Her vision is otherwise fine.  She has had styes in the past though not lasting quite this long.          Problem list and histories reviewed & adjusted, as indicated.  Additional history: as documented    Patient Active Problem List   Diagnosis     Cataracts, bilateral     Alopecia areata     Breast cyst, left     Fibrocystic breast changes     Abnormal mammogram     Greater trochanteric bursitis, right     HCD (health care directive)     Hyperlipidemia     Low back pain     Osteoporosis     Patellofemoral syndrome     Postmenopausal     Sprain of wrist     Vitamin D deficiency     Abnormal glucose     Past Surgical History:   Procedure Laterality Date     CATARACT IOL, RT/LT       COLONOSCOPY  08/03/2016    incomplete     COLONOSCOPY  08/04/2016     DEXA - HIM SCAN  05/13/2015     EXCISE BREAST CYST/FIBROADENOMA/TUMOR/DUCT LESION/NIPPLE LESION/AREOLAR LESION Left 05/24/2001    left breast cyst  "aspiration     HYSTERECTOMY  1998    took ovaries and cervix as well, removed due to cyst (benign)       Social History   Substance Use Topics     Smoking status: Former Smoker     Smokeless tobacco: Never Used      Comment: couple of cigs per day for about 30 years.     Alcohol use 0.0 oz/week     0 Standard drinks or equivalent per week      Comment: socially     Family History   Problem Relation Age of Onset     Hypertension Mother      Coronary Artery Disease Mother      Dementia Mother      Parkinsonism Mother      Other - See Comments Mother      lewy body disease     Macular Degeneration Mother      Cataracts Mother      Colon Cancer Father      age 93     Stomach Cancer Father      Breast Cancer Sister      Thyroid Cancer Sister      Brain Tumor Sister      Cataracts Sister      Tumor Sister      non-malignant brain tumor     Pancreatic Cancer Other      DIABETES No family hx of              Reviewed and updated as needed this visit by clinical staff     Reviewed and updated as needed this visit by Provider         ROS:  Constitutional, HEENT, cardiovascular, pulmonary, gi and gu systems are negative, except as otherwise noted.      OBJECTIVE:   /64 (BP Location: Left arm, Patient Position: Sitting, Cuff Size: Adult Large)  Pulse 70  Temp 97.4  F (36.3  C) (Tympanic)  Ht 5' 3.5\" (1.613 m)  Wt 208 lb 6.4 oz (94.5 kg)  BMI 36.34 kg/m2  Body mass index is 36.34 kg/(m^2).  GENERAL: healthy, alert and no distress  NECK: no adenopathy, no asymmetry, masses, or scars and thyroid normal to palpation  RESP: lungs clear to auscultation - no rales, rhonchi or wheezes  CV: regular rate and rhythm, normal S1 S2, no S3 or S4, no murmur, click or rub, no peripheral edema and peripheral pulses strong  Both tympanic membranes look normal.  She does not have any facial tenderness.  There is no adenopathy.  Pupils are equal round and reactive to light.  Extraocular motions normal.  There is a stye on the left lower " lateral margin.  It is not actively draining.  Slightly tender and inflamed.  Slightly swollen.  Visual acuity is intact visual fields are normal.  OROPHARYNX is normal     Diagnostic Test Results:  none     ASSESSMENT/PLAN:         ICD-10-CM    1. Need for hepatitis C screening test Z11.59 Hepatitis C Screen Reflex to HCV RNA Quant and Genotype   2. Hordeolum externum of left lower eyelid H00.015 erythromycin (ROMYCIN) ophthalmic ointment       I advised warm packs 4 times a day.  Given that it has been present for 3 weeks we will try and ophthalmic antibiotic ointment as well.  She will use that 4 times a day.  Advised her to see her eye doctor within the next 2-3 weeks as well.  Sooner if necessary.  Patient education was provided.    Hepatitis C screening was discussed and patient agreed to get that done today.  See Patient Instructions    Laurie Ayoub MD  [facility]

## 2017-12-05 NOTE — PATIENT INSTRUCTIONS
Please use the warm packs four times daily   Also the antibiotics ointment four times daily.  Please see your eye doctor in the next 2-3 weeks .      Search stye or chalazion for more information online.    Sty (or Stye)  A sty is an infection of the oil gland of the eyelid. It may develop into a small pocket of pus (an abscess). This can cause pain, redness, and swelling. In early stages, a sty is treated with antibiotic cream, eye drops, or a small towel soaked in warm water (a warm compress). More severe cases may need to be opened and drained by a healthcare provider.  Home care    Eye drops or ointment are usually prescribed to treat the infection. Use these as directed.     Artificial tears may also be used to lubricate the eye and make it more comfortable. You can buy these over the counter without a prescription. Talk with your healthcare provider before using any over-the-counter treatment for a sty.    Apply a warm, damp towel to the affected eye for at least 5 minutes, 3 to 4 times a day for a week. Warm compresses open the pores and speed the healing. But if the compresses are too hot, they may burn your eyelid.    Sometimes the sty will drain with this treatment alone. If this happens, keep using the antibiotic until all the redness and swelling are gone.    Wash your hands before and after touching the infected eyelid to avoid spreading the infection.    Don t squeeze or try to break open the sty.  Follow-up care  Follow up with your healthcare provider, or as advised.   When to seek medical advice  Call your healthcare provider right away if any of these occur:    Increase in swelling or redness around the eyelid after 48 to 72 hours    Increase in eye pain or the eyelid blisters    Increase in warmth--the eyelid feels hot    Drainage of blood or thick pus from the sty    Blister on the eyelid    Inability to open the eyelid due to swelling    Fever of 100.4 F (38 C) or above, or as directed by your  provider    Vision changes    Headache or stiff neck    The sty comes back  Date Last Reviewed: 8/1/2017 2000-2017 [employer]. [address], [place] [address]. All rights reserved. This information is not intended as a substitute for professional medical care. Always follow your healthcare professional's instructions.

## 2017-12-05 NOTE — MR AVS SNAPSHOT
After Visit Summary   12/5/2017    Payton Gardiner    MRN: [id number]           Patient Information     Date Of Birth          1950        Visit Information        Provider Department      12/5/2017 2:00 PM Laurie Ayoub MD [facility]        Today's Diagnoses     Need for hepatitis C screening test    -  1    Hordeolum externum of left lower eyelid          Care Instructions    Please use the warm packs four times daily   Also the antibiotics ointment four times daily.  Please see your eye doctor in the next 2-3 weeks .      Search stye or chalazion for more information online.    Sty (or Stye)  A sty is an infection of the oil gland of the eyelid. It may develop into a small pocket of pus (an abscess). This can cause pain, redness, and swelling. In early stages, a sty is treated with antibiotic cream, eye drops, or a small towel soaked in warm water (a warm compress). More severe cases may need to be opened and drained by a healthcare provider.  Home care    Eye drops or ointment are usually prescribed to treat the infection. Use these as directed.     Artificial tears may also be used to lubricate the eye and make it more comfortable. You can buy these over the counter without a prescription. Talk with your healthcare provider before using any over-the-counter treatment for a sty.    Apply a warm, damp towel to the affected eye for at least 5 minutes, 3 to 4 times a day for a week. Warm compresses open the pores and speed the healing. But if the compresses are too hot, they may burn your eyelid.    Sometimes the sty will drain with this treatment alone. If this happens, keep using the antibiotic until all the redness and swelling are gone.    Wash your hands before and after touching the infected eyelid to avoid spreading the infection.    Don t squeeze or try to break open the sty.  Follow-up care  Follow up with your healthcare provider, or as advised.   When to seek medical  advice  Call your healthcare provider right away if any of these occur:    Increase in swelling or redness around the eyelid after 48 to 72 hours    Increase in eye pain or the eyelid blisters    Increase in warmth--the eyelid feels hot    Drainage of blood or thick pus from the sty    Blister on the eyelid    Inability to open the eyelid due to swelling    Fever of 100.4 F (38 C) or above, or as directed by your provider    Vision changes    Headache or stiff neck    The sty comes back  Date Last Reviewed: 8/1/2017 2000-2017 [employer]. [address] [place] [address]. All rights reserved. This information is not intended as a substitute for professional medical care. Always follow your healthcare professional's instructions.                Follow-ups after your visit        Your next 10 appointments already scheduled     May 07, 2018  8:40 AM CDT   Office Visit with Araseli Frazier DO   [facility]    [address] [address] [phone]           Bring a current list of meds and any records pertaining to this visit. For Physicals, please bring immunization records and any forms needing to be filled out. Please arrive 10 minutes early to complete paperwork.              Who to contact     Normal or non-critical lab and imaging results will be communicated to you by MyChart, letter or phone within 4 business days after the clinic has received the results. If you do not hear from us within 7 days, please contact the clinic through MyChart or phone. If you have a critical or abnormal lab result, we will notify you by phone as soon as possible.  Submit refill requests through [employer] or call your pharmacy and they will forward the refill request to us. Please allow 3 business days for your refill to be completed.          If you need to speak with a  for additional information , please call:  "[phone]           Additional Information About Your Visit        MyChart Information     [employer] gives you secure access to your electronic health record. If you see a primary care provider, you can also send messages to your care team and make appointments. If you have questions, please call your primary care clinic.  If you do not have a primary care provider, please call [phone] and they will assist you.        Care EveryWhere ID     This is your Care EveryWhere ID. This could be used by other organizations to access your [facility] medical records  [id number]        Your Vitals Were     Pulse Temperature Height BMI (Body Mass Index)          70 97.4  F (36.3  C) (Tympanic) 5' 3.5\" (1.613 m) 36.34 kg/m2         Blood Pressure from Last 3 Encounters:   12/05/17 116/64   07/13/17 128/76   05/01/17 132/78    Weight from Last 3 Encounters:   12/05/17 208 lb 6.4 oz (94.5 kg)   07/13/17 207 lb 12.8 oz (94.3 kg)   05/01/17 200 lb 12.8 oz (91.1 kg)              We Performed the Following     Hepatitis C Screen Reflex to HCV RNA Quant and Genotype          Today's Medication Changes          These changes are accurate as of: 12/5/17  2:22 PM.  If you have any questions, ask your nurse or doctor.               Start taking these medicines.        Dose/Directions    erythromycin ophthalmic ointment   Commonly known as:  ROMYCIN   Used for:  Hordeolum externum of left lower eyelid   Started by:  Laurie Ayoub MD        Dose:  0.25 inch   Place 0.25 inches Into the left eye 3 times daily   Quantity:  1 g   Refills:  0            Where to get your medicines      These medications were sent to [facility] [place] - [address]  [address] [place] [address]     Phone:  [phone]     erythromycin ophthalmic ointment                Primary Care Provider Office Phone # Fax #    Araseli Ninoska Frazier [phone] [address] [place] [address]        Equal Access to " Services     [facility]: Hadii aad ku hadotfnadja Terriemya, waaxda luqadaha, qaybta kaalmada lenny, tiny mendoza. So [facility] [phone].    ATENCIÓN: Si habla shanique, tiene a patten disposición servicios gratuitos de asistencia lingüística. Llame al [phone].    We comply with applicable federal civil rights laws and [place] laws. We do not discriminate on the basis of race, color, national origin, age, disability, sex, sexual orientation, or gender identity.            Thank you!     Thank you for choosing [facility]  for your care. Our goal is always to provide you with excellent care. Hearing back from our patients is one way we can continue to improve our services. Please take a few minutes to complete the written survey that you may receive in the mail after your visit with us. Thank you!             Your Updated Medication List - Protect others around you: Learn how to safely use, store and throw away your medicines at www.disposemymeds.org.          This list is accurate as of: 12/5/17  2:22 PM.  Always use your most recent med list.                   Brand Name Dispense Instructions for use Diagnosis    APPLE CIDER VINEGAR PO           ASPIRIN PO      Take 81 mg by mouth        CALCIUM 600 + D PO           erythromycin ophthalmic ointment    ROMYCIN    1 g    Place 0.25 inches Into the left eye 3 times daily    Hordeolum externum of left lower eyelid       FIBER PO      Take by mouth daily        vitamin B complex with vitamin C Tabs tablet      Take 1 tablet by mouth daily        vitamin D 2000 UNITS Caps

## 2017-12-06 LAB — HCV AB SERPL QL IA: NONREACTIVE

## 2017-12-21 ENCOUNTER — NURSE TRIAGE (OUTPATIENT)
Dept: NURSING | Facility: CLINIC | Age: 67
End: 2017-12-21

## 2017-12-21 NOTE — TELEPHONE ENCOUNTER
I connected the patient to the [facility] clinic's number for an eye appointment.  Tari Shay RN-[facility]

## 2018-05-07 ENCOUNTER — OFFICE VISIT (OUTPATIENT)
Dept: FAMILY MEDICINE | Facility: CLINIC | Age: 68
End: 2018-05-07
Payer: COMMERCIAL

## 2018-05-07 VITALS
WEIGHT: 205.4 LBS | HEIGHT: 64 IN | DIASTOLIC BLOOD PRESSURE: 80 MMHG | BODY MASS INDEX: 35.07 KG/M2 | TEMPERATURE: 97.2 F | SYSTOLIC BLOOD PRESSURE: 142 MMHG | HEART RATE: 70 BPM

## 2018-05-07 DIAGNOSIS — R73.09 ABNORMAL GLUCOSE: ICD-10-CM

## 2018-05-07 DIAGNOSIS — Z00.00 MEDICARE ANNUAL WELLNESS VISIT, SUBSEQUENT: Primary | ICD-10-CM

## 2018-05-07 DIAGNOSIS — E78.5 HYPERLIPIDEMIA, UNSPECIFIED HYPERLIPIDEMIA TYPE: ICD-10-CM

## 2018-05-07 LAB
ANION GAP SERPL CALCULATED.3IONS-SCNC: 4 MMOL/L (ref 3–14)
BUN SERPL-MCNC: 21 MG/DL (ref 7–30)
CALCIUM SERPL-MCNC: 8.8 MG/DL (ref 8.5–10.1)
CHLORIDE SERPL-SCNC: 108 MMOL/L (ref 94–109)
CHOLEST SERPL-MCNC: 201 MG/DL
CO2 SERPL-SCNC: 30 MMOL/L (ref 20–32)
CREAT SERPL-MCNC: 0.74 MG/DL (ref 0.52–1.04)
GFR SERPL CREATININE-BSD FRML MDRD: 77 ML/MIN/1.7M2
GLUCOSE SERPL-MCNC: 91 MG/DL (ref 70–99)
HBA1C MFR BLD: 5.5 % (ref 0–5.6)
HDLC SERPL-MCNC: 69 MG/DL
LDLC SERPL CALC-MCNC: 113 MG/DL
NONHDLC SERPL-MCNC: 132 MG/DL
POTASSIUM SERPL-SCNC: 4.4 MMOL/L (ref 3.4–5.3)
SODIUM SERPL-SCNC: 142 MMOL/L (ref 133–144)
TRIGL SERPL-MCNC: 95 MG/DL

## 2018-05-07 PROCEDURE — 83036 HEMOGLOBIN GLYCOSYLATED A1C: CPT | Performed by: FAMILY MEDICINE

## 2018-05-07 PROCEDURE — 80048 BASIC METABOLIC PNL TOTAL CA: CPT | Performed by: FAMILY MEDICINE

## 2018-05-07 PROCEDURE — 99397 PER PM REEVAL EST PAT 65+ YR: CPT | Performed by: FAMILY MEDICINE

## 2018-05-07 PROCEDURE — 36415 COLL VENOUS BLD VENIPUNCTURE: CPT | Performed by: FAMILY MEDICINE

## 2018-05-07 PROCEDURE — 80061 LIPID PANEL: CPT | Performed by: FAMILY MEDICINE

## 2018-05-07 NOTE — PROGRESS NOTES
SUBJECTIVE:   Payton Gardiner is a 68 year old female who presents for Preventive Visit.      Are you in the first 12 months of your Medicare Part B coverage?  No    Healthy Habits:    Do you get at least three servings of calcium containing foods daily (dairy, green leafy vegetables, etc.)? no, taking calcium and/or vitamin D supplement: yes -     Amount of exercise or daily activities, outside of work: 3 day(s) per week    Problems taking medications regularly No    Medication side effects: No    Have you had an eye exam in the past two years? no    Do you see a dentist twice per year? yes    Do you have sleep apnea, excessive snoring or daytime drowsiness?no      Ability to successfully perform activities of daily living: Yes, no assistance needed    Home safety:  none identified     Hearing impairment: No    Fall risk:  Fallen 2 or more times in the past year?: No  Any fall with injury in the past year?: No        COGNITIVE SCREEN  1) Repeat 3 items (Banana, Sunrise, Chair)    2) Clock draw: NORMAL  3) 3 item recall: Recalls 2 objects   Results: NORMAL clock, 1-2 items recalled: COGNITIVE IMPAIRMENT LESS LIKELY    Mini-CogTM Copyright S Janak. Licensed by the author for use in [facility]; reprinted with permission (beronica@[facility]). All rights reserved.            *  Has a stiff back in the morning when she wakes.  Feels fine once she gets moving and gets a shower.  Since March has been doing water boot camp 3 times per week.  Not interested in PT at this time      Reviewed and updated as needed this visit by clinical staff  Tobacco  Allergies  Meds  Problems  Med Hx  Surg Hx  Fam Hx  Soc Hx          Reviewed and updated as needed this visit by Provider  Tobacco  Allergies  Meds  Problems  Med Hx  Surg Hx  Fam Hx  Soc Hx         Social History   Substance Use Topics     Smoking status: Former Smoker     Smokeless tobacco: Never Used      Comment: couple of cigs per day for about 30 years.  "    Alcohol use 0.0 oz/week     0 Standard drinks or equivalent per week      Comment: socially       If you drink alcohol do you typically have >3 drinks per day or >7 drinks per week? No                        Today's PHQ-2 Score:   PHQ-2 ( 1999 Pfizer) 5/7/2018 5/1/2017   Q1: Little interest or pleasure in doing things 0 0   Q2: Feeling down, depressed or hopeless 1 0   PHQ-2 Score 1 0       Do you feel safe in your environment - Yes    Do you have a Health Care Directive?: Yes: Patient states has Advance Directive and will bring in a copy to clinic.    Current providers sharing in care for this patient include:   Patient Care Team:  Araseli Frazier DO as PCP - General (Family Practice)    The following health maintenance items are reviewed in Epic and correct as of today:  Health Maintenance   Topic Date Due     FALL RISK ASSESSMENT  05/01/2018     INFLUENZA VACCINE (Season Ended) 09/01/2018     MAMMO SCREEN Q2 YR (SYSTEM ASSIGNED)  06/01/2019     ADVANCE DIRECTIVE PLANNING Q5 YRS  09/24/2020     TETANUS IMMUNIZATION (SYSTEM ASSIGNED)  01/10/2021     COLONOSCOPY Q5 YR  08/04/2021     LIPID SCREEN Q5 YR FEMALE (SYSTEM ASSIGNED)  05/01/2022     DEXA SCAN SCREENING (SYSTEM ASSIGNED)  Completed     PNEUMOCOCCAL  Completed     HEPATITIS C SCREENING  Completed       Mammogram Screening: Patient over age 50, mutual decision to screen reflected in health maintenance.    ROS:  Constitutional, HEENT, cardiovascular, pulmonary, gi and gu systems are negative, except as otherwise noted.    OBJECTIVE:   /80  Pulse 70  Temp 97.2  F (36.2  C) (Tympanic)  Ht 5' 3.75\" (1.619 m)  Wt 205 lb 6.4 oz (93.2 kg)  Breastfeeding? No  BMI 35.53 kg/m2 Estimated body mass index is 35.53 kg/(m^2) as calculated from the following:    Height as of this encounter: 5' 3.75\" (1.619 m).    Weight as of this encounter: 205 lb 6.4 oz (93.2 kg).  EXAM:   GENERAL APPEARANCE: healthy, alert and no distress  EYES: Eyes grossly normal to " "inspection, PERRL and conjunctivae and sclerae normal  HENT: ear canals and TM's normal, nose and mouth without ulcers or lesions, oropharynx clear and oral mucous membranes moist  NECK: no adenopathy, no asymmetry, masses, or scars and thyroid normal to palpation  RESP: lungs clear to auscultation - no rales, rhonchi or wheezes  BREAST: normal without masses, tenderness or nipple discharge and no palpable axillary masses or adenopathy  CV: regular rate and rhythm, normal S1 S2, no S3 or S4, no murmur, click or rub, no peripheral edema and peripheral pulses strong  ABDOMEN: soft, nontender, no hepatosplenomegaly, no masses and bowel sounds normal  MS: no musculoskeletal defects are noted and gait is age appropriate without ataxia  NEURO: Normal strength and tone, sensory exam grossly normal, mentation intact and speech normal  PSYCH: mentation appears normal and affect normal/bright    ASSESSMENT / PLAN:       ICD-10-CM    1. Medicare annual wellness visit, subsequent Z00.00    2. Hyperlipidemia, unspecified hyperlipidemia type E78.5 Lipid panel reflex to direct LDL Fasting   3. Abnormal glucose R73.09 Basic metabolic panel     Hemoglobin A1c       End of Life Planning:  Patient currently has an advanced directive: Yes.  Practitioner is supportive of decision.    COUNSELING:  Reviewed preventive health counseling, as reflected in patient instructions  Special attention given to:       Regular exercise       Healthy diet/nutrition       Osteoporosis Prevention/Bone Health    BP Screening:   Last 3 BP Readings:    BP Readings from Last 3 Encounters:   05/07/18 142/80   12/05/17 116/64   07/13/17 128/76       The following was recommended to the patient:  Re-screen BP within a year and recommended lifestyle modifications    Estimated body mass index is 35.53 kg/(m^2) as calculated from the following:    Height as of this encounter: 5' 3.75\" (1.619 m).    Weight as of this encounter: 205 lb 6.4 oz (93.2 kg).  Weight " management plan: Discussed healthy diet and exercise guidelines and patient will follow up in 12 months in clinic to re-evaluate.     reports that she has quit smoking. She has never used smokeless tobacco.      Appropriate preventive services were discussed with this patient, including applicable screening as appropriate for cardiovascular disease, diabetes, osteopenia/osteoporosis, and glaucoma.  As appropriate for age/gender, discussed screening for colorectal cancer, prostate cancer, breast cancer, and cervical cancer. Checklist reviewing preventive services available has been given to the patient.    Reviewed patients plan of care and provided an AVS. The Basic Care Plan (routine screening as documented in Health Maintenance) for Payton meets the Care Plan requirement. This Care Plan has been established and reviewed with the Patient.    Counseling Resources:  ATP IV Guidelines  Pooled Cohorts Equation Calculator  Breast Cancer Risk Calculator  FRAX Risk Assessment  ICSI Preventive Guidelines  Dietary Guidelines for Americans, 2010  [employer]'s MyPlate  ASA Prophylaxis  Lung CA Screening    Araseli Frazier, DO  [facility]    Patient Instructions   Just let me know if you would like to pursue physical therapy for the back    I will let you know your lab results when available      Preventive Health Recommendations    Female Ages 65 +    Yearly exam:     See your health care provider every year in order to  o Review health changes.   o Discuss preventive care.    o Review your medicines if your doctor has prescribed any.      You no longer need a yearly Pap test unless you've had an abnormal Pap test in the past 10 years. If you have vaginal symptoms, such as bleeding or discharge, be sure to talk with your provider about a Pap test.      Every 1 to 2 years, have a mammogram.  If you are over 69, talk with your health care provider about whether or not you want to continue having screening  mammograms.      Every 10 years, have a colonoscopy. Or, have a yearly FIT test (stool test). These exams will check for colon cancer.       Have a cholesterol test every 5 years, or more often if your doctor advises it.       Have a diabetes test (fasting glucose) every three years. If you are at risk for diabetes, you should have this test more often.       At age 65, have a bone density scan (DEXA) to check for osteoporosis (brittle bone disease).    Shots:    Get a flu shot each year.    Get a tetanus shot every 10 years.    Talk to your doctor about your pneumonia vaccines. There are now two you should receive - Pneumovax (PPSV 23) and Prevnar (PCV 13).    Talk to your doctor about the shingles vaccine.    Talk to your doctor about the hepatitis B vaccine.    Nutrition:     Eat at least 5 servings of fruits and vegetables each day.      Eat whole-grain bread, whole-wheat pasta and brown rice instead of white grains and rice.      Talk to your provider about Calcium and Vitamin D.     Lifestyle    Exercise at least 150 minutes a week (30 minutes a day, 5 days a week). This will help you control your weight and prevent disease.      Limit alcohol to one drink per day.      No smoking.       Wear sunscreen to prevent skin cancer.       See your dentist twice a year for an exam and cleaning.      See your eye doctor every 1 to 2 years to screen for conditions such as glaucoma, macular degeneration and cataracts.

## 2018-05-07 NOTE — NURSING NOTE
"Initial /80  Pulse 70  Temp 97.2  F (36.2  C) (Tympanic)  Ht 5' 3.75\" (1.619 m)  Wt 205 lb 6.4 oz (93.2 kg)  Breastfeeding? No  BMI 35.53 kg/m2 Estimated body mass index is 35.53 kg/(m^2) as calculated from the following:    Height as of this encounter: 5' 3.75\" (1.619 m).    Weight as of this encounter: 205 lb 6.4 oz (93.2 kg). .      "

## 2018-05-07 NOTE — MR AVS SNAPSHOT
After Visit Summary   5/7/2018    Payton Gardiner    MRN: [id number]           Patient Information     Date Of Birth          1950        Visit Information        Provider Department      5/7/2018 8:40 AM Araseli Frazier DO [facility]        Today's Diagnoses     Medicare annual wellness visit, subsequent    -  1    Hyperlipidemia, unspecified hyperlipidemia type        Abnormal glucose          Care Instructions    Just let me know if you would like to pursue physical therapy for the back    I will let you know your lab results when available      Preventive Health Recommendations    Female Ages 65 +    Yearly exam:     See your health care provider every year in order to  o Review health changes.   o Discuss preventive care.    o Review your medicines if your doctor has prescribed any.      You no longer need a yearly Pap test unless you've had an abnormal Pap test in the past 10 years. If you have vaginal symptoms, such as bleeding or discharge, be sure to talk with your provider about a Pap test.      Every 1 to 2 years, have a mammogram.  If you are over 69, talk with your health care provider about whether or not you want to continue having screening mammograms.      Every 10 years, have a colonoscopy. Or, have a yearly FIT test (stool test). These exams will check for colon cancer.       Have a cholesterol test every 5 years, or more often if your doctor advises it.       Have a diabetes test (fasting glucose) every three years. If you are at risk for diabetes, you should have this test more often.       At age 65, have a bone density scan (DEXA) to check for osteoporosis (brittle bone disease).    Shots:    Get a flu shot each year.    Get a tetanus shot every 10 years.    Talk to your doctor about your pneumonia vaccines. There are now two you should receive - Pneumovax (PPSV 23) and Prevnar (PCV 13).    Talk to your doctor about the shingles vaccine.    Talk to your doctor  "about the hepatitis B vaccine.    Nutrition:     Eat at least 5 servings of fruits and vegetables each day.      Eat whole-grain bread, whole-wheat pasta and brown rice instead of white grains and rice.      Talk to your provider about Calcium and Vitamin D.     Lifestyle    Exercise at least 150 minutes a week (30 minutes a day, 5 days a week). This will help you control your weight and prevent disease.      Limit alcohol to one drink per day.      No smoking.       Wear sunscreen to prevent skin cancer.       See your dentist twice a year for an exam and cleaning.      See your eye doctor every 1 to 2 years to screen for conditions such as glaucoma, macular degeneration and cataracts.          Follow-ups after your visit        Your next 10 appointments already scheduled     Jun 05, 2018 10:30 AM CDT   MA SCREENING BILATERAL W/ SUNIL with [id number]   [facility] ([facility])    [address] [address]   [phone]           Three-dimensional (3D) mammograms are available at [place] locations in [facility], [facility], [facility], and [place]. Health locations include [place] and [facility] in [place]. Benefits of 3D mammograms include: - Improved rate of cancer detection - Decreases your chance of having to go back for more tests, which means fewer: - \"False-positive\" results (This means that there is an abnormal area but it isn't cancer.) - Invasive testing procedures, such as a biopsy or surgery - Can provide clearer images of the breast if you have dense breast tissue. 3D mammography is an optional exam that anyone can have with a 2D mammogram. It doesn't replace or take the place of a 2D mammogram. 2D mammograms remain an effective screening test for all women.  Not all insurance companies cover the cost of a 3D mammogram. Check with your insurance.              Who to contact     Normal or non-critical " "lab and imaging results will be communicated to you by MyChart, letter or phone within 4 business days after the clinic has received the results. If you do not hear from us within 7 days, please contact the clinic through [employer] or phone. If you have a critical or abnormal lab result, we will notify you by phone as soon as possible.  Submit refill requests through [employer] or call your pharmacy and they will forward the refill request to us. Please allow 3 business days for your refill to be completed.          If you need to speak with a  for additional information , please call: [phone]           Additional Information About Your Visit        [employer] Information     [employer] gives you secure access to your electronic health record. If you see a primary care provider, you can also send messages to your care team and make appointments. If you have questions, please call your primary care clinic.  If you do not have a primary care provider, please call [phone] and they will assist you.        Care EveryWhere ID     This is your Care EveryWhere ID. This could be used by other organizations to access your [place] medical records  [id number]        Your Vitals Were     Pulse Temperature Height Breastfeeding? BMI (Body Mass Index)       70 97.2  F (36.2  C) (Tympanic) 5' 3.75\" (1.619 m) No 35.53 kg/m2        Blood Pressure from Last 3 Encounters:   05/07/18 142/80   12/05/17 116/64   07/13/17 128/76    Weight from Last 3 Encounters:   05/07/18 205 lb 6.4 oz (93.2 kg)   12/05/17 208 lb 6.4 oz (94.5 kg)   07/13/17 207 lb 12.8 oz (94.3 kg)              We Performed the Following     Basic metabolic panel     Hemoglobin A1c     Lipid panel reflex to direct LDL Fasting        Primary Care Provider Office Phone # Fax #    Araseli Frazier [phone] [address] [place] [address]        Equal Access to Services     ELIZABETH JOHANSEN AH: vicente Kaur " leana hayeslewiswillie elliotttiny muhammad modestatrev mendoza. So [facility] [phone].    ATENCIÓN: Si [place], tiene a patten disposición servicios gratuitos de asistencia lingüística. Llame al [phone].    We comply with applicable federal civil rights laws and [place] laws. We do not discriminate on the basis of race, color, national origin, age, disability, sex, sexual orientation, or gender identity.            Thank you!     Thank you for choosing [facility]  for your care. Our goal is always to provide you with excellent care. Hearing back from our patients is one way we can continue to improve our services. Please take a few minutes to complete the written survey that you may receive in the mail after your visit with us. Thank you!             Your Updated Medication List - Protect others around you: Learn how to safely use, store and throw away your medicines at www.disposemymeds.org.          This list is accurate as of 5/7/18  9:20 AM.  Always use your most recent med list.                   Brand Name Dispense Instructions for use Diagnosis    APPLE CIDER VINEGAR PO           ASPIRIN PO      Take 81 mg by mouth        CALCIUM 600 + D PO           FIBER PO      Take by mouth daily        vitamin B complex with vitamin C Tabs tablet      Take 1 tablet by mouth daily        vitamin D 2000 units Caps

## 2018-05-07 NOTE — PATIENT INSTRUCTIONS
Just let me know if you would like to pursue physical therapy for the back    I will let you know your lab results when available      Preventive Health Recommendations    Female Ages 65 +    Yearly exam:     See your health care provider every year in order to  o Review health changes.   o Discuss preventive care.    o Review your medicines if your doctor has prescribed any.      You no longer need a yearly Pap test unless you've had an abnormal Pap test in the past 10 years. If you have vaginal symptoms, such as bleeding or discharge, be sure to talk with your provider about a Pap test.      Every 1 to 2 years, have a mammogram.  If you are over 69, talk with your health care provider about whether or not you want to continue having screening mammograms.      Every 10 years, have a colonoscopy. Or, have a yearly FIT test (stool test). These exams will check for colon cancer.       Have a cholesterol test every 5 years, or more often if your doctor advises it.       Have a diabetes test (fasting glucose) every three years. If you are at risk for diabetes, you should have this test more often.       At age 65, have a bone density scan (DEXA) to check for osteoporosis (brittle bone disease).    Shots:    Get a flu shot each year.    Get a tetanus shot every 10 years.    Talk to your doctor about your pneumonia vaccines. There are now two you should receive - Pneumovax (PPSV 23) and Prevnar (PCV 13).    Talk to your doctor about the shingles vaccine.    Talk to your doctor about the hepatitis B vaccine.    Nutrition:     Eat at least 5 servings of fruits and vegetables each day.      Eat whole-grain bread, whole-wheat pasta and brown rice instead of white grains and rice.      Talk to your provider about Calcium and Vitamin D.     Lifestyle    Exercise at least 150 minutes a week (30 minutes a day, 5 days a week). This will help you control your weight and prevent disease.      Limit alcohol to one drink per  day.      No smoking.       Wear sunscreen to prevent skin cancer.       See your dentist twice a year for an exam and cleaning.      See your eye doctor every 1 to 2 years to screen for conditions such as glaucoma, macular degeneration and cataracts.

## 2018-06-12 ENCOUNTER — TRANSFERRED RECORDS (OUTPATIENT)
Dept: HEALTH INFORMATION MANAGEMENT | Facility: CLINIC | Age: 68
End: 2018-06-12

## 2018-11-07 ENCOUNTER — OFFICE VISIT (OUTPATIENT)
Dept: ORTHOPEDICS | Facility: CLINIC | Age: 68
End: 2018-11-07
Payer: COMMERCIAL

## 2018-11-07 ENCOUNTER — RADIANT APPOINTMENT (OUTPATIENT)
Dept: GENERAL RADIOLOGY | Facility: CLINIC | Age: 68
End: 2018-11-07
Attending: FAMILY MEDICINE
Payer: COMMERCIAL

## 2018-11-07 VITALS
WEIGHT: 206 LBS | SYSTOLIC BLOOD PRESSURE: 153 MMHG | BODY MASS INDEX: 35.17 KG/M2 | DIASTOLIC BLOOD PRESSURE: 89 MMHG | HEIGHT: 64 IN

## 2018-11-07 DIAGNOSIS — M22.41 CHONDROMALACIA OF PATELLOFEMORAL JOINT, RIGHT: ICD-10-CM

## 2018-11-07 DIAGNOSIS — M25.561 ACUTE PAIN OF RIGHT KNEE: ICD-10-CM

## 2018-11-07 DIAGNOSIS — M25.561 ACUTE PAIN OF RIGHT KNEE: Primary | ICD-10-CM

## 2018-11-07 DIAGNOSIS — R93.7 ABNORMAL BONE XRAY: ICD-10-CM

## 2018-11-07 PROCEDURE — 99214 OFFICE O/P EST MOD 30 MIN: CPT | Mod: 25 | Performed by: FAMILY MEDICINE

## 2018-11-07 PROCEDURE — 20611 DRAIN/INJ JOINT/BURSA W/US: CPT | Mod: RT | Performed by: FAMILY MEDICINE

## 2018-11-07 PROCEDURE — 73562 X-RAY EXAM OF KNEE 3: CPT

## 2018-11-07 RX ORDER — TRIAMCINOLONE ACETONIDE 40 MG/ML
40 INJECTION, SUSPENSION INTRA-ARTICULAR; INTRAMUSCULAR
Status: DISCONTINUED | OUTPATIENT
Start: 2018-11-07 | End: 2019-09-26

## 2018-11-07 RX ORDER — ROPIVACAINE HYDROCHLORIDE 5 MG/ML
3 INJECTION, SOLUTION EPIDURAL; INFILTRATION; PERINEURAL
Status: DISCONTINUED | OUTPATIENT
Start: 2018-11-07 | End: 2019-09-26

## 2018-11-07 RX ADMIN — TRIAMCINOLONE ACETONIDE 40 MG: 40 INJECTION, SUSPENSION INTRA-ARTICULAR; INTRAMUSCULAR at 12:40

## 2018-11-07 RX ADMIN — ROPIVACAINE HYDROCHLORIDE 3 ML: 5 INJECTION, SOLUTION EPIDURAL; INFILTRATION; PERINEURAL at 12:40

## 2018-11-07 NOTE — PROGRESS NOTES
"Payton Gardiner  :  1950  DOS: 2018  MRN: [id number]    Sports Medicine Clinic Visit    PCP: Araseli Frazier    Payton Gardiner is a 68 year old female who is seen as a self referral presenting with right knee pain.    Injury: Gradual onset of right knee pain, mostly with driving and water aerobics over the last ~ 3 months.  Pain located over right deep anterior, medial, and posterior knee, nonradiating.  Additional Features:  Positive: swelling and grinding.  Symptoms are better with Ibuprofen and Rest.  Symptoms are worse with: prolonged sitting/driving, ascending stairs, twisting motions.  Other evaluation and/or treatments so far consists of: Ibuprofen and Rest.  Recent imaging completed: No recent imaging completed.  Prior History of related problems: none - h/o left knee OA in 2017 that improved with steroid injection.    Social History: retired    Review of Systems  Musculoskeletal: as above  Remainder of review of systems is negative including constitutional, CV, pulmonary, GI, Skin and Neurologic except as noted in HPI or medical history.    No past medical history on file.  Past Surgical History:   Procedure Laterality Date     CATARACT IOL, RT/LT       COLONOSCOPY  2016    incomplete     COLONOSCOPY  2016     DEXA - HIM SCAN  2015     EXCISE BREAST CYST/FIBROADENOMA/TUMOR/DUCT LESION/NIPPLE LESION/AREOLAR LESION Left 2001    left breast cyst aspiration     HYSTERECTOMY      took ovaries and cervix as well, removed due to cyst (benign)       Objective  /89  Ht 5' 3.75\" (1.619 m)  Wt 206 lb (93.4 kg)  BMI 35.64 kg/m2    General: healthy, alert and in no distress    HEENT: no scleral icterus or conjunctival erythema   Skin: no suspicious lesions or rash. No jaundice.   CV: regular rhythm by palpation, 2+ distal pulses, no pedal edema    Resp: normal respiratory effort without conversational dyspnea   Psych: normal mood and affect    Gait: mildly antalgic, " appropriate coordination and balance   Neuro: normal light touch sensory exam of the extremities. Motor strength as noted below     Right Knee exam    ROM:        Flexion 140 degrees       Extension 0 degrees       Range of motion limited by mild pain in terminal flexion    Inspection:       no visible ecchymosis        effusion noted trace    Skin:       no visible deformities       well perfused       capillary refill brisk    Patellar Motion:        Lateral tilt noted in patella       Crepitus noted in the patellofemoral joint    Tender:        lateral patellar border       medial joint line    Non Tender:         remainder of knee area        medial patellar border        lateral joint line        along MCL        distal IT Band        infrapatellar tendon        tibial tubercle       pes anserine bursa    Special Tests:        neg (-) Mandy       neg (-) Lachmans       neg (-) anterior drawer       neg (-) posterior drawer       Mild + PF grind       neg (-) varus at 0 deg and 30 deg       neg (-) valgus at 0 deg and 30 deg    Evaluation of ipsilateral kinetic chain       normal strength with hip extension and abduction, but somewhat deconditioned b/l       Decreased strength with R>L quad activation, mild    Large Joint Injection/Arthocentesis  Date/Time: 11/7/2018 12:40 PM  Performed by: SARA YANEZ  Authorized by: SARA YANEZ     Indications:  Osteoarthritis and pain  Needle Size:  21 G  Guidance: ultrasound    Approach:  Superolateral  Location:  Knee  Site:  R knee joint  Medications:  40 mg triamcinolone acetonide 40 MG/ML; 3 mL ropivacaine 5 MG/ML  Outcome:  Tolerated well, no immediate complications  Procedure discussed: discussed risks, benefits, and alternatives    Consent Given by:  Patient  Prep: patient was prepped and draped in usual sterile fashion          Radiology  XR images independently visualized and reviewed with patient today in clinic  Mild b/l medial  compartment narrowing with small osteophyte formation  Moderate lateral PF narrowing b/l, with lateral patellar tilt  Incidental finding of likely endochondroma in distal R femur  Will follow final radiology read, discussed briefly with DRE Dee radiologist    Assessment:  1. Acute pain of right knee    2. Chondromalacia of patellofemoral joint, right    3. Abnormal bone xray        Plan:  Discussed the assessment with the patient.  Follow up: prn  Signs of intraarticular knee pain, possibly distal hamstring strain  Given upcoming trip, we elected for CSI today, good initial relief from anesthetic effect  XR images independently visualized and reviewed with patient today in clinic  Incidental finding of likely endochondroma, will plan to re-image in one year  Reviewed wt loss, activity modification and progressive increase in activity as tolerated and guided by pain  Reviewed options for potential steroid vs viscosupplementation injections and the possibility for future orthopedic referral prn  Reviewed safe and appropriate OTC medication choices, try tylenol first  Up to 3000mg daily of tylenol is generally safe, NSAID dosing and duration limitations reviewed  Discussed nature of degenerative arthrosis of the knee.   Discussed symptom treatment with ice or heat, topical treatments, and rest if needed.   Expectations and goals of CSI reviewed  Often 2-3 days for steroid effect, and can take up to two weeks for maximum effect  We discussed modified progressive pain-free activity as tolerated  Do not overuse in first two weeks if feeling better due to concern for vulnerability while steroid is working  Supportive care reviewed  All questions were answered today  Contact us with additional questions or concerns  Signs and sx of concern reviewed      Luiz Acevedo DO, CAQ  Primary Care Sports Medicine  [facility]               Disclaimer: This note consists of symbols derived from keyboarding,  dictation and/or voice recognition software. As a result, there may be errors in the script that have gone undetected. Please consider this when interpreting information found in this chart.

## 2018-11-07 NOTE — MR AVS SNAPSHOT
"              After Visit Summary   11/7/2018    Payton Gardiner    MRN: [id number]           Patient Information     Date Of Birth          1950        Visit Information        Provider Department      11/7/2018 12:00 PM Luiz Acevedo DO [facility]        Today's Diagnoses     Acute pain of right knee    -  1    Chondromalacia of patellofemoral joint, right        Abnormal bone xray           Follow-ups after your visit        Who to contact     If you have questions or need follow up information about today's clinic visit or your schedule please contact [facility] directly at [phone].  Normal or non-critical lab and imaging results will be communicated to you by [employer], letter or phone within 4 business days after the clinic has received the results. If you do not hear from us within 7 days, please contact the clinic through [employer] or phone. If you have a critical or abnormal lab result, we will notify you by phone as soon as possible.  Submit refill requests through [employer] or call your pharmacy and they will forward the refill request to us. Please allow 3 business days for your refill to be completed.          Additional Information About Your Visit        MyChart Information     [employer] gives you secure access to your electronic health record. If you see a primary care provider, you can also send messages to your care team and make appointments. If you have questions, please call your primary care clinic.  If you do not have a primary care provider, please call [phone] and they will assist you.        Care EveryWhere ID     This is your Care EveryWhere ID. This could be used by other organizations to access your [facility] medical records  [id number]        Your Vitals Were     Height BMI (Body Mass Index)                5' 3.75\" (1.619 m) 35.64 kg/m2           Blood Pressure from Last 3 Encounters:   11/07/18 153/89 "   05/07/18 142/80   12/05/17 116/64    Weight from Last 3 Encounters:   11/07/18 206 lb (93.4 kg)   05/07/18 205 lb 6.4 oz (93.2 kg)   12/05/17 208 lb 6.4 oz (94.5 kg)              We Performed the Following     Large Joint Injection/Arthocentesis        Primary Care Provider Office Phone # Fax #    Araseli Frazier,  [phone] [address] [place] [address]        Equal Access to Services     [facility]: Hadii aad ku hadasho Soomaali, waaxda luqadaha, qaybta kaalmada adeegyada, waxay modestain hayjohn greco . So [facility] [place] [phone].    ATENCIÓN: Si habla español, tiene a patten disposición servicios gratuitos de asistencia lingüística. Ll[facility] - [facility] [phone].    We comply with applicable federal civil rights laws and [place] laws. We do not discriminate on the basis of race, color, national origin, age, disability, sex, sexual orientation, or gender identity.            Thank you!     Thank you for choosing [facility] [place]  for your care. Our goal is always to provide you with excellent care. Hearing back from our patients is one way we can continue to improve our services. Please take a few minutes to complete the written survey that you may receive in the mail after your visit with us. Thank you!             Your Updated Medication List - Protect others around you: Learn how to safely use, store and throw away your medicines at www.disposemymeds.org.          This list is accurate as of 11/7/18  1:21 PM.  Always use your most recent med list.                   Brand Name Dispense Instructions for use Diagnosis    APPLE CIDER VINEGAR PO           ASPIRIN PO      Take 81 mg by mouth        CALCIUM 600 + D PO           FIBER PO      Take by mouth daily        vitamin B complex with vitamin C Tabs tablet      Take 1 tablet by mouth daily        vitamin D 2000 units Caps

## 2018-11-07 NOTE — LETTER
"    2018         RE: Payton Gardiner  [address] [place] [address]        Dear Colleague,    Thank you for referring your patient, Payton Gardiner, to the [facility]. Please see a copy of my visit note below.    Payton Gardiner  :  1950  DOS: 2018  MRN: [id number]    Sports Medicine Clinic Visit    PCP: Araseli Frazier    Payton Gardiner is a 68 year old female who is seen as a self referral presenting with right knee pain.    Injury: Gradual onset of right knee pain, mostly with driving and water aerobics over the last ~ 3 months.  Pain located over right deep anterior, medial, and posterior knee, nonradiating.  Additional Features:  Positive: swelling and grinding.  Symptoms are better with Ibuprofen and Rest.  Symptoms are worse with: prolonged sitting/driving, ascending stairs, twisting motions.  Other evaluation and/or treatments so far consists of: Ibuprofen and Rest.  Recent imaging completed: No recent imaging completed.  Prior History of related problems: none - h/o left knee OA in 2017 that improved with steroid injection.    Social History: retired    Review of Systems  Musculoskeletal: as above  Remainder of review of systems is negative including constitutional, CV, pulmonary, GI, Skin and Neurologic except as noted in HPI or medical history.    No past medical history on file.  Past Surgical History:   Procedure Laterality Date     CATARACT IOL, RT/LT       COLONOSCOPY  2016    incomplete     COLONOSCOPY  2016     DEXA - HIM SCAN  2015     EXCISE BREAST CYST/FIBROADENOMA/TUMOR/DUCT LESION/NIPPLE LESION/AREOLAR LESION Left 2001    left breast cyst aspiration     HYSTERECTOMY      took ovaries and cervix as well, removed due to cyst (benign)       Objective  /89  Ht 5' 3.75\" (1.619 m)  Wt 206 lb (93.4 kg)  BMI 35.64 kg/m2    General: healthy, alert and in no distress    HEENT: no scleral icterus or conjunctival erythema "   Skin: no suspicious lesions or rash. No jaundice.   CV: regular rhythm by palpation, 2+ distal pulses, no pedal edema    Resp: normal respiratory effort without conversational dyspnea   Psych: normal mood and affect    Gait: mildly antalgic, appropriate coordination and balance   Neuro: normal light touch sensory exam of the extremities. Motor strength as noted below     Right Knee exam    ROM:        Flexion 140 degrees       Extension 0 degrees       Range of motion limited by mild pain in terminal flexion    Inspection:       no visible ecchymosis        effusion noted trace    Skin:       no visible deformities       well perfused       capillary refill brisk    Patellar Motion:        Lateral tilt noted in patella       Crepitus noted in the patellofemoral joint    Tender:        lateral patellar border       medial joint line    Non Tender:         remainder of knee area        medial patellar border        lateral joint line        along MCL        distal IT Band        infrapatellar tendon        tibial tubercle       pes anserine bursa    Special Tests:        neg (-) Mandy       neg (-) Lachmans       neg (-) anterior drawer       neg (-) posterior drawer       Mild + PF grind       neg (-) varus at 0 deg and 30 deg       neg (-) valgus at 0 deg and 30 deg    Evaluation of ipsilateral kinetic chain       normal strength with hip extension and abduction, but somewhat deconditioned b/l       Decreased strength with R>L quad activation, mild    Large Joint Injection/Arthocentesis  Date/Time: 11/7/2018 12:40 PM  Performed by: SARA YANEZ  Authorized by: SARA YANEZ     Indications:  Osteoarthritis and pain  Needle Size:  21 G  Guidance: ultrasound    Approach:  Superolateral  Location:  Knee  Site:  R knee joint  Medications:  40 mg triamcinolone acetonide 40 MG/ML; 3 mL ropivacaine 5 MG/ML  Outcome:  Tolerated well, no immediate complications  Procedure discussed: discussed risks,  benefits, and alternatives    Consent Given by:  Patient  Prep: patient was prepped and draped in usual sterile fashion          Radiology  XR images independently visualized and reviewed with patient today in clinic  Mild b/l medial compartment narrowing with small osteophyte formation  Moderate lateral PF narrowing b/l, with lateral patellar tilt  Incidental finding of likely endochondroma in distal R femur  Will follow final radiology read, discussed briefly with DRE Dee radiologist    Assessment:  1. Acute pain of right knee    2. Chondromalacia of patellofemoral joint, right    3. Abnormal bone xray        Plan:  Discussed the assessment with the patient.  Follow up: prn  Signs of intraarticular knee pain, possibly distal hamstring strain  Given upcoming trip, we elected for CSI today, good initial relief from anesthetic effect  XR images independently visualized and reviewed with patient today in clinic  Incidental finding of likely endochondroma, will plan to re-image in one year  Reviewed wt loss, activity modification and progressive increase in activity as tolerated and guided by pain  Reviewed options for potential steroid vs viscosupplementation injections and the possibility for future orthopedic referral prn  Reviewed safe and appropriate OTC medication choices, try tylenol first  Up to 3000mg daily of tylenol is generally safe, NSAID dosing and duration limitations reviewed  Discussed nature of degenerative arthrosis of the knee.   Discussed symptom treatment with ice or heat, topical treatments, and rest if needed.   Expectations and goals of CSI reviewed  Often 2-3 days for steroid effect, and can take up to two weeks for maximum effect  We discussed modified progressive pain-free activity as tolerated  Do not overuse in first two weeks if feeling better due to concern for vulnerability while steroid is working  Supportive care reviewed  All questions were answered today  Contact us with  additional questions or concerns  Signs and sx of concern reviewed      Luiz Acevedo DO, RYAN  Primary Care Sports Medicine  [facility]               Disclaimer: This note consists of symbols derived from keyboarding, dictation and/or voice recognition software. As a result, there may be errors in the script that have gone undetected. Please consider this when interpreting information found in this chart.    Again, thank you for allowing me to participate in the care of your patient.        Sincerely,        Luiz Acevedo DO

## 2018-11-30 ENCOUNTER — MYC MEDICAL ADVICE (OUTPATIENT)
Dept: ORTHOPEDICS | Facility: CLINIC | Age: 68
End: 2018-11-30

## 2018-11-30 ENCOUNTER — TELEPHONE (OUTPATIENT)
Dept: ORTHOPEDICS | Facility: CLINIC | Age: 68
End: 2018-11-30

## 2018-11-30 ENCOUNTER — HOSPITAL ENCOUNTER (OUTPATIENT)
Dept: MRI IMAGING | Facility: CLINIC | Age: 68
Discharge: HOME | End: 2018-11-30
Attending: FAMILY MEDICINE | Admitting: FAMILY MEDICINE
Payer: MEDICARE

## 2018-11-30 DIAGNOSIS — M25.561 ACUTE PAIN OF RIGHT KNEE: ICD-10-CM

## 2018-11-30 DIAGNOSIS — M22.41 CHONDROMALACIA OF PATELLOFEMORAL JOINT, RIGHT: ICD-10-CM

## 2018-11-30 DIAGNOSIS — R93.6 ABNORMAL MRI, KNEE: Primary | ICD-10-CM

## 2018-11-30 DIAGNOSIS — R93.7 ABNORMAL BONE XRAY: ICD-10-CM

## 2018-11-30 PROCEDURE — 73721 MRI JNT OF LWR EXTRE W/O DYE: CPT | Mod: RT

## 2018-11-30 NOTE — TELEPHONE ENCOUNTER
Discussed MR results.  She has f/u appt with me on Wednesday to review and update clinical exam.  From description over the phone it matches with pes anserine bursitis.  We discussed the femur lesion and recommendatiosn for CT scan.  Ordered today and I will contact her with results.

## 2018-12-03 ENCOUNTER — RADIANT APPOINTMENT (OUTPATIENT)
Dept: CT IMAGING | Facility: CLINIC | Age: 68
End: 2018-12-03
Attending: FAMILY MEDICINE
Payer: COMMERCIAL

## 2018-12-03 DIAGNOSIS — R93.6 ABNORMAL MRI, KNEE: ICD-10-CM

## 2018-12-03 PROCEDURE — 73700 CT LOWER EXTREMITY W/O DYE: CPT | Mod: TC

## 2018-12-04 ENCOUNTER — TELEPHONE (OUTPATIENT)
Dept: ORTHOPEDICS | Facility: CLINIC | Age: 68
End: 2018-12-04

## 2018-12-04 DIAGNOSIS — M89.9 BONE LESION: ICD-10-CM

## 2018-12-04 DIAGNOSIS — R93.6 ABNORMAL MRI, KNEE: Primary | ICD-10-CM

## 2018-12-04 DIAGNOSIS — R93.89 ABNORMAL FINDING ON CT SCAN: ICD-10-CM

## 2018-12-04 NOTE — TELEPHONE ENCOUNTER
Discussed in detail the results of CT scan today.  Referral placed with orthopedic oncology, will try to facilitate prompt consultation.  Discussed expectation for bone biopsy, pending their evaluation.  Discussed I would be happy to see her in the meantime to discuss supportive care for other knee issues, but will defer consideration of CSI to pes anserine bursa so as not to complicate tx or options for upcoming consultation with Ortho Onc.  All questions answered.  We discussed having her daughter come with to her appt tomorrow to have another set of ears and eyes.  Daughter is an RN.

## 2018-12-05 ENCOUNTER — OFFICE VISIT (OUTPATIENT)
Dept: ORTHOPEDICS | Facility: CLINIC | Age: 68
End: 2018-12-05
Payer: COMMERCIAL

## 2018-12-05 VITALS
DIASTOLIC BLOOD PRESSURE: 84 MMHG | SYSTOLIC BLOOD PRESSURE: 172 MMHG | HEIGHT: 64 IN | BODY MASS INDEX: 35 KG/M2 | WEIGHT: 205 LBS

## 2018-12-05 DIAGNOSIS — M89.9 BONE LESION: Primary | ICD-10-CM

## 2018-12-05 DIAGNOSIS — S83.241D TEAR OF MEDIAL MENISCUS OF RIGHT KNEE, CURRENT, UNSPECIFIED TEAR TYPE, SUBSEQUENT ENCOUNTER: ICD-10-CM

## 2018-12-05 DIAGNOSIS — R93.6 ABNORMAL MRI, KNEE: ICD-10-CM

## 2018-12-05 DIAGNOSIS — M22.41 CHONDROMALACIA OF PATELLOFEMORAL JOINT, RIGHT: ICD-10-CM

## 2018-12-05 PROCEDURE — 99214 OFFICE O/P EST MOD 30 MIN: CPT | Performed by: FAMILY MEDICINE

## 2018-12-05 ASSESSMENT — ENCOUNTER SYMPTOMS
ARTHRALGIAS: 1
JOINT SWELLING: 0
MUSCLE CRAMPS: 0
MYALGIAS: 1
MUSCLE WEAKNESS: 0
STIFFNESS: 0
BACK PAIN: 0
NECK PAIN: 0

## 2018-12-05 NOTE — LETTER
2018         RE: Payton Gardiner  [address] [place] [address]        Dear Colleague,    Thank you for referring your patient, Payton Gardiner, to the [facility]. Please see a copy of my visit note below.    Payton Gardiner  :  1950  DOS: 18  MRN: [id number]    Sports Medicine Clinic Visit    PCP: Araseli Frazier    Payton Gardiner is a 68 year old female who is seen as a self referral presenting with right knee pain.    Injury: Gradual onset of right knee pain, mostly with driving and water aerobics over the last ~ 3 months.  Pain located over right deep anterior, medial, and posterior knee, nonradiating.  Additional Features:  Positive: swelling and grinding.  Symptoms are better with Ibuprofen and Rest.  Symptoms are worse with: prolonged sitting/driving, ascending stairs, twisting motions.  Other evaluation and/or treatments so far consists of: Ibuprofen and Rest.  Recent imaging completed: No recent imaging completed.  Prior History of related problems: none - h/o left knee OA in 2017 that improved with steroid injection.    Social History: retired    Interim History - 2018  Since last visit on 18 patient has moderate right knee pain over anterior and medial knee.  Right knee steroid injection completed on  provided good relief of deep joint pain.  Here to review MRI and CT results.  Patient has appointment scheduled with Ortho surgery for later this week.  No new injury in the interim.    Review of Systems  Musculoskeletal: as above  Remainder of review of systems is negative including constitutional, CV, pulmonary, GI, Skin and Neurologic except as noted in HPI or medical history.    No past medical history on file.  Past Surgical History:   Procedure Laterality Date     CATARACT IOL, RT/LT       COLONOSCOPY  2016    incomplete     COLONOSCOPY  2016     DEXA - HIM SCAN  2015     EXCISE BREAST  "CYST/FIBROADENOMA/TUMOR/DUCT LESION/NIPPLE LESION/AREOLAR LESION Left 05/24/2001    left breast cyst aspiration     HYSTERECTOMY  1998    took ovaries and cervix as well, removed due to cyst (benign)       Objective  /84  Ht 5' 3.75\" (1.619 m)  Wt 205 lb (93 kg)  BMI 35.46 kg/m2    General: healthy, alert and in no distress    HEENT: no scleral icterus or conjunctival erythema   Skin: no suspicious lesions or rash. No jaundice.   CV: regular rhythm by palpation, 2+ distal pulses, no pedal edema    Resp: normal respiratory effort without conversational dyspnea   Psych: normal mood and affect    Gait: mildly antalgic, appropriate coordination and balance   Neuro: normal light touch sensory exam of the extremities. Motor strength as noted below     Right Knee exam    ROM:        Flexion 140 degrees       Extension 0 degrees       Range of motion limited by mild pain in terminal flexion    Inspection:       no visible ecchymosis        effusion noted trace    Skin:       no visible deformities       well perfused       capillary refill brisk    Patellar Motion:        Lateral tilt noted in patella       Crepitus noted in the patellofemoral joint    Tender:        lateral patellar border       medial joint line       Pes anserine bursa and proximal/medial tibia    Non Tender:         remainder of knee area        medial patellar border        lateral joint line        along MCL        distal IT Band        infrapatellar tendon        tibial tubercle    Special Tests:        Painful Mandy       neg (-) Lachmans       neg (-) anterior drawer       neg (-) posterior drawer       Mild + PF grind       neg (-) varus at 0 deg and 30 deg       neg (-) valgus at 0 deg and 30 deg    Evaluation of ipsilateral kinetic chain       normal strength with hip extension and abduction, but somewhat deconditioned b/l       Decreased strength with R>L quad activation, mild    Procedures    Radiology  Recent Results (from the " past 744 hour(s))   XR Knee Standing AP Bilat [place]le[place] Bilat Lat Right    Narrative    KNEE STANDING AP BILATERAL SUNRISE BILATERAL LATERAL RIGHT  11/7/2018  12:12 PM     HISTORY:  Acute pain of right knee    COMPARISON: None.      Impression    IMPRESSION: There is a 3.8 cm area of calcification typical of a  chondroid lesion likely representing an enchondroma in the medial  femoral condyle. Recommend one year follow-up x-ray. There may be a  tiny enchondroma as well in the distal left femoral diaphysis. Joint  spaces in the medial and lateral compartment appear fairly  well-preserved. Moderate lateral patellofemoral degenerative changes.    LELA GUADALUPE MD   MR Knee Right w/o Contrast    Narrative    MR RIGHT KNEE WITHOUT CONTRAST  11/30/2018 11:32 AM    HISTORY:  Medial right knee pain related to overuse since 11/20/2018.  Lesion seen on radiographs.    TECHNIQUE:  Axial and coronal T2 with fat suppression. Coronal T1.  Sagittal dual echo T2.    COMPARISON: Plain films 11/7/2018.    FINDINGS:   Medial Meniscus: There is a tear and/or avulsion of the posterior  meniscal root ligament region (image 6 of series 7). The remainder of  the posterior horn and midbody show intrasubstance myxoid degenerative  change without additional tear. There is mild peripheral extrusion of  the mid body.       Lateral Meniscus: Mild intrasubstance myxoid degenerative change with  no evidence for tear.       Anterior Cruciate Ligament: Unremarkable.     Posterior Cruciate Ligament: Unremarkable.     Medial Collateral Ligament: Unremarkable.    Lateral Collateral Ligament Complex, Popliteus Tendon: The iliotibial  band, fibular collateral ligament, biceps femoris tendon, and  popliteus tendon are unremarkable.    Osseous and Cartilaginous Structures: No acute-appearing bony  abnormality. Minimal subchondral bone marrow edema in the medial  tibial plateau of uncertain etiology. This may be reactive change  related to mild chondromalacia,  but a mild microtrabecular injury or  stress injury without fracture is not excluded. Small amount of  subcortical bone marrow edema in the tibial spine region is also of  uncertain etiology and significance. There is mild grade II  chondromalacia in the weightbearing medial compartment. Articular  cartilages in the weightbearing lateral compartment appear normal.  Diffuse grade III and grade IV chondromalacia throughout the lateral  patellar facet and adjacent median eminence. At least grade II  chondromalacia at the lateral aspect of the trochlear groove. Mild  lateral patellofemoral joint bony spurring.    There is a space-occupying lesion in the distal femur at the medial  epicondylar region measuring up to 3.4 x 3.6 x 2.0 cm. This lesion  shows decreased T1 signal which is very heterogeneous peripherally and  heterogeneous increased and decreased T2 signal. The heterogeneous  signal intensity correlates with the presence of calcified matrix on  the plain film. This type of matrix is most consistent with a  cartilaginous tumor. The border between the lesion and normal bone  marrow fat is sharply defined, and there is no surrounding bone marrow  edema. However, the anterior medial aspect of the tumor appears to  have markedly thinned and slightly expanded and potentially violated  the cortex (image 9 of series 3). No associated extraosseous soft  tissue mass. Breech of the cortex is significantly more common in  chondrosarcomas than benign enchondromas. A high-resolution CT exam  may be helpful for more detailed evaluation of the cortex as well as  the internal matrix. Regardless, a malignant cartilaginous tumor is  not excluded.    Extensor Mechanism: The quadriceps and patellar tendons are  unremarkable. The medial and lateral patellar retinacula are normal.    Joint Space: Small joint effusion. No definite loose bodies  appreciated.    Additional Findings: No significant popliteal cyst.  No  semimembranosus-tibial collateral ligament bursitis. However, there is  a small amount of fluid related to the pes anserine bursa consistent  with bursitis (images 21 through 25 of series 3).      Impression    IMPRESSION:   1. 3.6 cm lesion in the distal femoral medial epicondylar region is  most likely a cartilaginous lesion. Potential breech of the anterior  medial cortex may indicate that this lesion is aggressive and  malignant. If clinically indicated, a high-resolution CT may be  helpful for further characterization. See above discussion.  2. Tear/avulsion posterior meniscal root ligament of the medial  meniscus.  3. Very mild subchondral bone marrow oedema medial tibial plateau of  uncertain etiology. A mild microtrabecular or stress injury without  fracture is possible.  4. Mild chondromalacia weightbearing medial compartment and advanced  chondromalacia lateral patellar facet.  5. Small joint space effusion. Pes anserine bursitis.    DIPIKA LANDEROS MD   CT Knee Right w/o Contrast    Narrative    CT RIGHT KNEE WITHOUT CONTRAST  12/3/2018 11:16 AM    HISTORY:  Evaluate lesion seen on MRI.    TECHNIQUE: Helical axial scans with sagittal and coronal  reconstructions. Radiation dose for this scan was reduced using  automated exposure control, adjustment of the mA and/or kV according  to patient size, or iterative reconstruction technique.    COMPARISON: MRI 11/30/2018.    FINDINGS: Again noted is a lesion in the distal femoral metaphysis  anteromedially. Irregular calcification throughout the lesion,  predominantly in the periphery. The lesion is unchanged in size since  the prior MRI measuring up to 3.5 cm. The type of calcification is  most suggestive of a tumor of cartilaginous origin, but the peripheral  distribution of the calcification is somewhat unusual. Regardless, the  CT exam was performed to confirm or exclude the presence of a breech  of the overlying cortex anteromedially. The CT exam  confirms that the  cortex is breached at a few small locations anteromedially (images 17  and 18 of series 3). There is no definite extraosseous soft tissue  mass.    A joint space effusion is present. There is 0.6 cm lateral subluxation  of the patella with the knee extended which was not evident on the  prior MRI. Joint space narrowing at the lateral patellofemoral joint  as seen on the MRI.      Impression    IMPRESSION:  1. Lesion of probable cartilaginous origin anteromedially in the  distal femoral metaphysis is again noted. The CT study confirms the  presence of small areas of cortical breach making this suspicious for  an aggressive lesion, potentially a chondrosarcoma.  2. Lateral subluxation of the patella with the knee extended, lateral  patellofemoral joint space narrowing and small joint space effusion.    DIPIKA LANDEROS MD     Assessment:  1. Bone lesion    2. Abnormal MRI, knee    3. Tear of medial meniscus of right knee, current, unspecified tear type, subsequent encounter    4. Chondromalacia of patellofemoral joint, right        Plan:  Discussed the assessment with the patient.  Follow up: prn  XR, CT and MR images independently visualized and reviewed with patient today in clinic  Reviewed thoughts related to her knee pain, which is due in some part to chondromalacia but also to meniscal root tear  Did well with CSI initially, before overexerting on a trip to [facility]  Now with further workup for a space-occupying lesion of her distal femur, endochondroma vs chondrosarcoma  Referral in place for orthopedic oncology consult  Reviewed workup with her and daughter today, answering all questions that I could, and providing some basic information about what we know so far and the possible next steps in workup and mgmt  Supportive care reviewed  All questions were answered today  Contact us with additional questions or concerns  Signs and sx of concern reviewed      Luiz Acevedo DO, CAVALERIE  Primary Care Sports  Medicine  [facility]     Time spent in one-on-one evaluation and discussion with patient regarding nature of problem, course, prior treatments, and therapeutic options, at least 50% of which was spent in counseling and coordination of care: 27 minutes            Disclaimer: This note consists of symbols derived from keyboarding, dictation and/or voice recognition software. As a result, there may be errors in the script that have gone undetected. Please consider this when interpreting information found in this chart.    Again, thank you for allowing me to participate in the care of your patient.        Sincerely,        Luiz Acevedo, DO

## 2018-12-05 NOTE — MR AVS SNAPSHOT
After Visit Summary   12/5/2018    Payton Gardiner    MRN: [id number]           Patient Information     Date Of Birth          1950        Visit Information        Provider Department      12/5/2018 11:40 AM Luiz Acevedo,  [facility]        Today's Diagnoses     Bone lesion    -  1    Abnormal MRI, knee        Tear of medial meniscus of right knee, current, unspecified tear type, subsequent encounter        Chondromalacia of patellofemoral joint, right          Care Instructions    Patient to follow up with Primary Care provider regarding elevated blood pressure.            Follow-ups after your visit        Your next 10 appointments already scheduled     Dec 07, 2018 11:45 AM CST   (Arrive by 11:30 AM)   NEW TUMOR VISIT with Ilir Henning MD   [facility] ([facility])    [address] [place] [id number] [phone]              Who to contact     If you have questions or need follow up information about today's clinic visit or your schedule please contact [facility] directly at [phone].  Normal or non-critical lab and imaging results will be communicated to you by MyChart, letter or phone within 4 business days after the clinic has received the results. If you do not hear from us within 7 days, please contact the clinic through [employer] or phone. If you have a critical or abnormal lab result, we will notify you by phone as soon as possible.  Submit refill requests through [employer] or call your pharmacy and they will forward the refill request to us. Please allow 3 business days for your refill to be completed.          Additional Information About Your Visit        MyChart Information     [employer] gives you secure access to your electronic health record. If you see a primary care provider, you can also send messages to your care team and make  "appointments. If you have questions, please call your primary care clinic.  If you do not have a primary care provider, please call [phone] and they will assist you.        Care EveryWhere ID     This is your Care EveryWhere ID. This could be used by other organizations to access your [facility] medical records  [id number]        Your Vitals Were     Height BMI (Body Mass Index)                5' 3.75\" (1.619 m) 35.46 kg/m2           Blood Pressure from Last 3 Encounters:   12/05/18 172/84   11/07/18 153/89   05/07/18 142/80    Weight from Last 3 Encounters:   12/05/18 205 lb (93 kg)   11/07/18 206 lb (93.4 kg)   05/07/18 205 lb 6.4 oz (93.2 kg)              Today, you had the following     No orders found for display       Primary Care Provider Office Phone # Fax #    Araseli Xiao DO Freddie [phone] [address] [place] [facility] [address]        Equal Access to Services     [facility]: Hadii aad ku hadasho Soomaali, waaxda luqadaha, qaybta kaalmada adeegyada, tiny hamilton hayjohn greco . So [facility] [phone].    ATENCIÓN: Si habla español, tiene a patten disposición servicios gratuitos de asistencia lingüística. Llame al [phone].    We comply with applicable federal civil rights laws and [place] laws. We do not discriminate on the basis of race, color, national origin, age, disability, sex, sexual orientation, or gender identity.            Thank you!     Thank you for choosing [facility]  for your care. Our goal is always to provide you with excellent care. Hearing back from our patients is one way we can continue to improve our services. Please take a few minutes to complete the written survey that you may receive in the mail after your visit with us. Thank you!             Your Updated Medication List - Protect others around you: Learn how to safely use, store and throw away your medicines at www.disposemymeds.org.          This list is " accurate as of 12/5/18  8:08 PM.  Always use your most recent med list.                   Brand Name Dispense Instructions for use Diagnosis    APPLE CIDER VINEGAR PO           ASPIRIN PO      Take 81 mg by mouth        CALCIUM 600 + D PO           FIBER PO      Take by mouth daily        vitamin B complex with vitamin C tablet      Take 1 tablet by mouth daily        vitamin D 2000 units Caps

## 2018-12-05 NOTE — TELEPHONE ENCOUNTER
FUTURE VISIT INFORMATION      FUTURE VISIT INFORMATION:    Date: 12/7/18    Time: 1145    Location: ORTHO  REFERRAL INFORMATION:    Referring provider:  DR YANEZ    Referring providers clinic:  [facility]    Reason for visit/diagnosis  R KNEE LESION     RECORDS REQUESTED FROM:       Clinic name Comments Records Status Imaging Status                                         RECORDS IN CHART

## 2018-12-05 NOTE — PROGRESS NOTES
"Payton Gardiner  :  1950  DOS: 18  MRN: [id number]    Sports Medicine Clinic Visit    PCP: Araseli Frazier    Payton Gardiner is a 68 year old female who is seen as a self referral presenting with right knee pain.    Injury: Gradual onset of right knee pain, mostly with driving and water aerobics over the last ~ 3 months.  Pain located over right deep anterior, medial, and posterior knee, nonradiating.  Additional Features:  Positive: swelling and grinding.  Symptoms are better with Ibuprofen and Rest.  Symptoms are worse with: prolonged sitting/driving, ascending stairs, twisting motions.  Other evaluation and/or treatments so far consists of: Ibuprofen and Rest.  Recent imaging completed: No recent imaging completed.  Prior History of related problems: none - h/o left knee OA in 2017 that improved with steroid injection.    Social History: retired    Interim History - 2018  Since last visit on 18 patient has moderate right knee pain over anterior and medial knee.  Right knee steroid injection completed on  provided good relief of deep joint pain.  Here to review MRI and CT results.  Patient has appointment scheduled with Ortho surgery for later this week.  No new injury in the interim.    Review of Systems  Musculoskeletal: as above  Remainder of review of systems is negative including constitutional, CV, pulmonary, GI, Skin and Neurologic except as noted in HPI or medical history.    No past medical history on file.  Past Surgical History:   Procedure Laterality Date     CATARACT IOL, RT/LT       COLONOSCOPY  2016    incomplete     COLONOSCOPY  2016     DEXA - HIM SCAN  2015     EXCISE BREAST CYST/FIBROADENOMA/TUMOR/DUCT LESION/NIPPLE LESION/AREOLAR LESION Left 2001    left breast cyst aspiration     HYSTERECTOMY      took ovaries and cervix as well, removed due to cyst (benign)       Objective  /84  Ht 5' 3.75\" (1.619 m)  Wt 205 lb (93 kg)  BMI " 35.46 kg/m2    General: healthy, alert and in no distress    HEENT: no scleral icterus or conjunctival erythema   Skin: no suspicious lesions or rash. No jaundice.   CV: regular rhythm by palpation, 2+ distal pulses, no pedal edema    Resp: normal respiratory effort without conversational dyspnea   Psych: normal mood and affect    Gait: mildly antalgic, appropriate coordination and balance   Neuro: normal light touch sensory exam of the extremities. Motor strength as noted below     Right Knee exam    ROM:        Flexion 140 degrees       Extension 0 degrees       Range of motion limited by mild pain in terminal flexion    Inspection:       no visible ecchymosis        effusion noted trace    Skin:       no visible deformities       well perfused       capillary refill brisk    Patellar Motion:        Lateral tilt noted in patella       Crepitus noted in the patellofemoral joint    Tender:        lateral patellar border       medial joint line       Pes anserine bursa and proximal/medial tibia    Non Tender:         remainder of knee area        medial patellar border        lateral joint line        along MCL        distal IT Band        infrapatellar tendon        tibial tubercle    Special Tests:        Painful Mandy       neg (-) Lachmans       neg (-) anterior drawer       neg (-) posterior drawer       Mild + PF grind       neg (-) varus at 0 deg and 30 deg       neg (-) valgus at 0 deg and 30 deg    Evaluation of ipsilateral kinetic chain       normal strength with hip extension and abduction, but somewhat deconditioned b/l       Decreased strength with R>L quad activation, mild    Procedures    Radiology  Recent Results (from the past 744 hour(s))   XR Knee Standing AP Bilat Rewey Bilat Lat Right    Narrative    KNEE STANDING AP BILATERAL SUNRISE BILATERAL LATERAL RIGHT  11/7/2018  12:12 PM     HISTORY:  Acute pain of right knee    COMPARISON: None.      Impression    IMPRESSION: There is a 3.8 cm area  of calcification typical of a  chondroid lesion likely representing an enchondroma in the medial  femoral condyle. Recommend one year follow-up x-ray. There may be a  tiny enchondroma as well in the distal left femoral diaphysis. Joint  spaces in the medial and lateral compartment appear fairly  well-preserved. Moderate lateral patellofemoral degenerative changes.    LELA GUADALUPE MD   MR Knee Right w/o Contrast    Narrative    MR RIGHT KNEE WITHOUT CONTRAST  11/30/2018 11:32 AM    HISTORY:  Medial right knee pain related to overuse since 11/20/2018.  Lesion seen on radiographs.    TECHNIQUE:  Axial and coronal T2 with fat suppression. Coronal T1.  Sagittal dual echo T2.    COMPARISON: Plain films 11/7/2018.    FINDINGS:   Medial Meniscus: There is a tear and/or avulsion of the posterior  meniscal root ligament region (image 6 of series 7). The remainder of  the posterior horn and midbody show intrasubstance myxoid degenerative  change without additional tear. There is mild peripheral extrusion of  the mid body.       Lateral Meniscus: Mild intrasubstance myxoid degenerative change with  no evidence for tear.       Anterior Cruciate Ligament: Unremarkable.     Posterior Cruciate Ligament: Unremarkable.     Medial Collateral Ligament: Unremarkable.    Lateral Collateral Ligament Complex, Popliteus Tendon: The iliotibial  band, fibular collateral ligament, biceps femoris tendon, and  popliteus tendon are unremarkable.    Osseous and Cartilaginous Structures: No acute-appearing bony  abnormality. Minimal subchondral bone marrow edema in the medial  tibial plateau of uncertain etiology. This may be reactive change  related to mild chondromalacia, but a mild microtrabecular injury or  stress injury without fracture is not excluded. Small amount of  subcortical bone marrow edema in the tibial spine region is also of  uncertain etiology and significance. There is mild grade II  chondromalacia in the weightbearing medial  compartment. Articular  cartilages in the weightbearing lateral compartment appear normal.  Diffuse grade III and grade IV chondromalacia throughout the lateral  patellar facet and adjacent median eminence. At least grade II  chondromalacia at the lateral aspect of the trochlear groove. Mild  lateral patellofemoral joint bony spurring.    There is a space-occupying lesion in the distal femur at the medial  epicondylar region measuring up to 3.4 x 3.6 x 2.0 cm. This lesion  shows decreased T1 signal which is very heterogeneous peripherally and  heterogeneous increased and decreased T2 signal. The heterogeneous  signal intensity correlates with the presence of calcified matrix on  the plain film. This type of matrix is most consistent with a  cartilaginous tumor. The border between the lesion and normal bone  marrow fat is sharply defined, and there is no surrounding bone marrow  edema. However, the anterior medial aspect of the tumor appears to  have markedly thinned and slightly expanded and potentially violated  the cortex (image 9 of series 3). No associated extraosseous soft  tissue mass. Breech of the cortex is significantly more common in  chondrosarcomas than benign enchondromas. A high-resolution CT exam  may be helpful for more detailed evaluation of the cortex as well as  the internal matrix. Regardless, a malignant cartilaginous tumor is  not excluded.    Extensor Mechanism: The quadriceps and patellar tendons are  unremarkable. The medial and lateral patellar retinacula are normal.    Joint Space: Small joint effusion. No definite loose bodies  appreciated.    Additional Findings: No significant popliteal cyst. No  semimembranosus-tibial collateral ligament bursitis. However, there is  a small amount of fluid related to the pes anserine bursa consistent  with bursitis (images 21 through 25 of series 3).      Impression    IMPRESSION:   1. 3.6 cm lesion in the distal femoral medial epicondylar region  is  most likely a cartilaginous lesion. Potential breech of the anterior  medial cortex may indicate that this lesion is aggressive and  malignant. If clinically indicated, a high-resolution CT may be  helpful for further characterization. See above discussion.  2. Tear/avulsion posterior meniscal root ligament of the medial  meniscus.  3. Very mild subchondral bone marrow oedema medial tibial plateau of  uncertain etiology. A mild microtrabecular or stress injury without  fracture is possible.  4. Mild chondromalacia weightbearing medial compartment and advanced  chondromalacia lateral patellar facet.  5. Small joint space effusion. Pes anserine bursitis.    DIPIKA LANDEROS MD   CT Knee Right w/o Contrast    Narrative    CT RIGHT KNEE WITHOUT CONTRAST  12/3/2018 11:16 AM    HISTORY:  Evaluate lesion seen on MRI.    TECHNIQUE: Helical axial scans with sagittal and coronal  reconstructions. Radiation dose for this scan was reduced using  automated exposure control, adjustment of the mA and/or kV according  to patient size, or iterative reconstruction technique.    COMPARISON: MRI 11/30/2018.    FINDINGS: Again noted is a lesion in the distal femoral metaphysis  anteromedially. Irregular calcification throughout the lesion,  predominantly in the periphery. The lesion is unchanged in size since  the prior MRI measuring up to 3.5 cm. The type of calcification is  most suggestive of a tumor of cartilaginous origin, but the peripheral  distribution of the calcification is somewhat unusual. Regardless, the  CT exam was performed to confirm or exclude the presence of a breech  of the overlying cortex anteromedially. The CT exam confirms that the  cortex is breached at a few small locations anteromedially (images 17  and 18 of series 3). There is no definite extraosseous soft tissue  mass.    A joint space effusion is present. There is 0.6 cm lateral subluxation  of the patella with the knee extended which was not evident on  the  prior MRI. Joint space narrowing at the lateral patellofemoral joint  as seen on the MRI.      Impression    IMPRESSION:  1. Lesion of probable cartilaginous origin anteromedially in the  distal femoral metaphysis is again noted. The CT study confirms the  presence of small areas of cortical breach making this suspicious for  an aggressive lesion, potentially a chondrosarcoma.  2. Lateral subluxation of the patella with the knee extended, lateral  patellofemoral joint space narrowing and small joint space effusion.    DIPIKA LANDEROS MD     Assessment:  1. Bone lesion    2. Abnormal MRI, knee    3. Tear of medial meniscus of right knee, current, unspecified tear type, subsequent encounter    4. Chondromalacia of patellofemoral joint, right        Plan:  Discussed the assessment with the patient.  Follow up: prn  XR, CT and MR images independently visualized and reviewed with patient today in clinic  Reviewed thoughts related to her knee pain, which is due in some part to chondromalacia but also to meniscal root tear  Did well with CSI initially, before overexerting on a trip to [facility]  Now with further workup for a space-occupying lesion of her distal femur, endochondroma vs chondrosarcoma  Referral in place for orthopedic oncology consult  Reviewed workup with her and daughter today, answering all questions that I could, and providing some basic information about what we know so far and the possible next steps in workup and mgmt  Supportive care reviewed  All questions were answered today  Contact us with additional questions or concerns  Signs and sx of concern reviewed      Luiz Acevedo DO, CAVALERIE  Primary Care Sports Medicine  [facility]     Time spent in one-on-one evaluation and discussion with patient regarding nature of problem, course, prior treatments, and therapeutic options, at least 50% of which was spent in counseling and coordination of care: 27 minutes            Disclaimer:  This note consists of symbols derived from keyboarding, dictation and/or voice recognition software. As a result, there may be errors in the script that have gone undetected. Please consider this when interpreting information found in this chart.

## 2018-12-07 ENCOUNTER — OFFICE VISIT (OUTPATIENT)
Dept: ORTHOPEDICS | Facility: CLINIC | Age: 68
End: 2018-12-07
Payer: COMMERCIAL

## 2018-12-07 ENCOUNTER — PRE VISIT (OUTPATIENT)
Dept: ORTHOPEDICS | Facility: CLINIC | Age: 68
End: 2018-12-07

## 2018-12-07 VITALS — HEIGHT: 64 IN | BODY MASS INDEX: 35 KG/M2 | WEIGHT: 205 LBS

## 2018-12-07 DIAGNOSIS — M89.9 BONE LESION: Primary | ICD-10-CM

## 2018-12-07 NOTE — NURSING NOTE
"Reason For Visit:   Chief Complaint   Patient presents with     Right Knee - Consult       Ht 1.619 m (5' 3.75\")  Wt 93 kg (205 lb)  BMI 35.46 kg/m2    Pain Assessment  Patient Currently in Pain: Yes  0-10 Pain Scale: 9  Primary Pain Location: Knee  Pain Orientation: Right  Pain Descriptors: Throbbing, Constant  Aggravating Factors: Movement    Hoang Fair ATC  "

## 2018-12-07 NOTE — PROGRESS NOTES
This 68-year-old woman has been having some bursitis near the attachment of her Pez tendons on the anteromedial right tibia.  She has been doing some water exercises and noticed increased knee pain with that activity.  X-rays showed this calcified lesion in her medial femoral condyle on the right side.  I reviewed her patient survey information in the EMR.    On examination she is alert oriented has a normal mood and affect and is in no acute distress.  Respirations are regular and unlabored eyes are nonicteric with equal pupils.  And her right lower extremity she has full extension of the knee in flexion past 90 degrees.  She is a minimal effusion.  She has tenderness over the medial tibial plateau near the insertion of the pedis tendons.  She is not tender over the medial femoral condyle.  There is no erythema edema or other masses in the right leg.  The leg is well perfused and grossly sensate.  She is able to ambulate independently.    Reviewed her CT scan x-ray and MRI.  She has a well calcified lesion with no surrounding edema.  It does seem to distort the outer edge of the femoral condyle very slightly.  This area is also well calcified.  This has appearance of an enchondroma or perhaps a grade 1 chondrosarcoma.    This patient has a large low-grade cartilage appearing lesion in the right medial femoral condyle.  This does not seem to be a source of her pain and is certainly not tender.  To my I think it is a benign and long-standing but the change in the contour of the femur bone raise the possibility that this could be a low-grade malignancy.  I discussed with the patient observation versus intralesional excision.  I will discuss this in our conference to get opinions on the best course of treatment for her and then we will get back to the patient with the plan.

## 2018-12-07 NOTE — LETTER
12/7/2018       RE: Payton Gardiner  [address] [place] [address]     Dear Colleague,    Thank you for referring your patient, Payton Gardiner, [facility]. Please see a copy of my visit note below.    This 68-year-old woman has been having some bursitis near the attachment of her Pez tendons on the anteromedial right tibia.  She has been doing some water exercises and noticed increased knee pain with that activity.  X-rays showed this calcified lesion in her medial femoral condyle on the right side.  I reviewed her patient survey information in the EMR.    On examination she is alert oriented has a normal mood and affect and is in no acute distress.  Respirations are regular and unlabored eyes are nonicteric with equal pupils.  And her right lower extremity she has full extension of the knee in flexion past 90 degrees.  She is a minimal effusion.  She has tenderness over the medial tibial plateau near the insertion of the pedis tendons.  She is not tender over the medial femoral condyle.  There is no erythema edema or other masses in the right leg.  The leg is well perfused and grossly sensate.  She is able to ambulate independently.    Reviewed her CT scan x-ray and MRI.  She has a well calcified lesion with no surrounding edema.  It does seem to distort the outer edge of the femoral condyle very slightly.  This area is also well calcified.  This has appearance of an enchondroma or perhaps a grade 1 chondrosarcoma.    This patient has a large low-grade cartilage appearing lesion in the right medial femoral condyle.  This does not seem to be a source of her pain and is certainly not tender.  To my I think it is a benign and long-standing but the change in the contour of the femur bone raise the possibility that this could be a low-grade malignancy.  I discussed with the patient observation versus intralesional excision.  I will discuss this  in our conference to get opinions on the best course of treatment for her and then we will get back to the patient with the plan.    Again, thank you for allowing me to participate in the care of your patient.      Sincerely,    Ilir Henning MD

## 2018-12-07 NOTE — MR AVS SNAPSHOT
"              After Visit Summary   12/7/2018    Payton Gardiner    MRN: [id number]           Patient Information     Date Of Birth          1950        Visit Information        Provider Department      12/7/2018 11:45 AM Ilir Henning MD [facility]        Today's Diagnoses     Bone lesion    -  1       Follow-ups after your visit        Who to contact     Please call your clinic at [phone] to:    Ask questions about your health    Make or cancel appointments    Discuss your medicines    Learn about your test results    Speak to your doctor            Additional Information About Your Visit        MyChart Information     [email or web address] gives you secure access to your electronic health record. If you see a primary care provider, you can also send messages to your care team and make appointments. If you have questions, please call your primary care clinic.  If you do not have a primary care provider, please call [phone] and they will assist you.      [email or web address] is an electronic gateway that provides easy, online access to your medical records. With [email or web address], you can request a clinic appointment, read your test results, renew a prescription or communicate with your care team.     To access your existing account, please contact your [facility] or call [phone] for assistance.        Care EveryWhere ID     This is your Care EveryWhere ID. This could be used by other organizations to access your [place] medical records  [id number]        Your Vitals Were     Height BMI (Body Mass Index)                1.619 m (5' 3.75\") 35.46 kg/m2           Blood Pressure from Last 3 Encounters:   12/05/18 172/84   11/07/18 153/89   05/07/18 142/80    Weight from Last 3 Encounters:   12/07/18 93 kg (205 lb)   12/05/18 93 kg (205 lb)   11/07/18 93.4 kg (206 lb)              Today, you had the following     No orders found for display       Primary Care Provider Office Phone " # Fax #    Araseli Frazier,  [phone] [address] [place] [address]        Equal Access to Services     ELIZABETH JOHANSEN : Hadii jan steven lisao Sokamranali, waaxda luqadaha, qaybta kaalmada adejb, tiny luceromello mendoza. So [facility] [phone].    ATENCIÓN: Si habla español, tiene a patten disposición servicios gratuitos de asistencia lingüística. Llame al [phone].    We comply with applicable federal civil rights laws and [place] laws. We do not discriminate on the basis of race, color, national origin, age, disability, sex, sexual orientation, or gender identity.            Thank you!     Thank you for choosing [facility]  for your care. Our goal is always to provide you with excellent care. Hearing back from our patients is one way we can continue to improve our services. Please take a few minutes to complete the written survey that you may receive in the mail after your visit with us. Thank you!             Your Updated Medication List - Protect others around you: Learn how to safely use, store and throw away your medicines at www.disposemymeds.org.          This list is accurate as of 12/7/18  1:10 PM.  Always use your most recent med list.                   Brand Name Dispense Instructions for use Diagnosis    APPLE CIDER VINEGAR PO           ASPIRIN PO      Take 81 mg by mouth        CALCIUM 600 + D PO           FIBER PO      Take by mouth daily        vitamin B complex with vitamin C tablet      Take 1 tablet by mouth daily        vitamin D 2000 units Caps

## 2018-12-13 ENCOUNTER — MYC MEDICAL ADVICE (OUTPATIENT)
Dept: ORTHOPEDICS | Facility: CLINIC | Age: 68
End: 2018-12-13

## 2018-12-13 NOTE — TELEPHONE ENCOUNTER
Payton,   I am sorry that we did not get to review your case this morning with his associates.  They were called to urgent surgeries and did not attend this meeting.  We have you on to discuss next week, if they have not spoken in the mean time.  Please check back in with us if you have not heard from us by next Friday.  Thanks so much   Shanell

## 2018-12-14 ENCOUNTER — TELEPHONE (OUTPATIENT)
Dept: ORTHOPEDICS | Facility: CLINIC | Age: 68
End: 2018-12-14

## 2018-12-15 NOTE — TELEPHONE ENCOUNTER
"RN calling and spoke with Payton .  Dr. Henning has reviewed this at [facility].  They would like her to repeat MRI in 6 months.    She is under the impression that it had to come out.  Her question was now or later.  She is going to [place] in February and just \"lay around  RN  Has asked Dr. Henning to call and speak with her, she has many other questions.  "

## 2019-03-01 ENCOUNTER — OFFICE VISIT (OUTPATIENT)
Dept: FAMILY MEDICINE | Facility: CLINIC | Age: 69
End: 2019-03-01
Payer: COMMERCIAL

## 2019-03-01 VITALS
HEIGHT: 64 IN | DIASTOLIC BLOOD PRESSURE: 80 MMHG | TEMPERATURE: 98 F | SYSTOLIC BLOOD PRESSURE: 150 MMHG | BODY MASS INDEX: 35.51 KG/M2 | WEIGHT: 208 LBS | HEART RATE: 80 BPM

## 2019-03-01 DIAGNOSIS — I10 ESSENTIAL HYPERTENSION WITH GOAL BLOOD PRESSURE LESS THAN 140/90: ICD-10-CM

## 2019-03-01 DIAGNOSIS — R00.2 PALPITATIONS: Primary | ICD-10-CM

## 2019-03-01 LAB
ALBUMIN SERPL-MCNC: 3.5 G/DL (ref 3.4–5)
ALP SERPL-CCNC: 56 U/L (ref 40–150)
ALT SERPL W P-5'-P-CCNC: 19 U/L (ref 0–50)
ANION GAP SERPL CALCULATED.3IONS-SCNC: 6 MMOL/L (ref 3–14)
AST SERPL W P-5'-P-CCNC: 12 U/L (ref 0–45)
BILIRUB SERPL-MCNC: 0.5 MG/DL (ref 0.2–1.3)
BUN SERPL-MCNC: 24 MG/DL (ref 7–30)
CALCIUM SERPL-MCNC: 9 MG/DL (ref 8.5–10.1)
CHLORIDE SERPL-SCNC: 107 MMOL/L (ref 94–109)
CO2 SERPL-SCNC: 26 MMOL/L (ref 20–32)
CREAT SERPL-MCNC: 0.72 MG/DL (ref 0.52–1.04)
ERYTHROCYTE [DISTWIDTH] IN BLOOD BY AUTOMATED COUNT: 13.3 % (ref 10–15)
GFR SERPL CREATININE-BSD FRML MDRD: 84 ML/MIN/{1.73_M2}
GLUCOSE SERPL-MCNC: 91 MG/DL (ref 70–99)
HCT VFR BLD AUTO: 38.6 % (ref 35–47)
HGB BLD-MCNC: 12.8 G/DL (ref 11.7–15.7)
MCH RBC QN AUTO: 29.6 PG (ref 26.5–33)
MCHC RBC AUTO-ENTMCNC: 33.2 G/DL (ref 31.5–36.5)
MCV RBC AUTO: 89 FL (ref 78–100)
PLATELET # BLD AUTO: 240 10E9/L (ref 150–450)
POTASSIUM SERPL-SCNC: 4.2 MMOL/L (ref 3.4–5.3)
PROT SERPL-MCNC: 7.4 G/DL (ref 6.8–8.8)
RBC # BLD AUTO: 4.32 10E12/L (ref 3.8–5.2)
SODIUM SERPL-SCNC: 139 MMOL/L (ref 133–144)
TSH SERPL DL<=0.005 MIU/L-ACNC: 1.45 MU/L (ref 0.4–4)
WBC # BLD AUTO: 6.8 10E9/L (ref 4–11)

## 2019-03-01 PROCEDURE — 84443 ASSAY THYROID STIM HORMONE: CPT | Performed by: FAMILY MEDICINE

## 2019-03-01 PROCEDURE — 36415 COLL VENOUS BLD VENIPUNCTURE: CPT | Performed by: FAMILY MEDICINE

## 2019-03-01 PROCEDURE — 85027 COMPLETE CBC AUTOMATED: CPT | Performed by: FAMILY MEDICINE

## 2019-03-01 PROCEDURE — 80053 COMPREHEN METABOLIC PANEL: CPT | Performed by: FAMILY MEDICINE

## 2019-03-01 PROCEDURE — 99214 OFFICE O/P EST MOD 30 MIN: CPT | Performed by: FAMILY MEDICINE

## 2019-03-01 PROCEDURE — 93000 ELECTROCARDIOGRAM COMPLETE: CPT | Performed by: FAMILY MEDICINE

## 2019-03-01 ASSESSMENT — MIFFLIN-ST. JEOR: SCORE: 1439.51

## 2019-03-01 NOTE — PATIENT INSTRUCTIONS
Your EKG looked good today.  Please call [phone] to schedule your monitor.    We'll do some blood work today as well and I will let you know results when I see them    It does appear that you have hypertension.  I would recommend we do some blood work to check the kidney and electrolytes.  Provided this is normal I would recommend beginning a medication like hydrochlorothiazide, a water pill, which is taken once daily.  I will let you know the results and formal plan through [facility]

## 2019-03-01 NOTE — PROGRESS NOTES
SUBJECTIVE:   Payton Gardiner is a 69 year old female who presents to clinic today for the following health issues:    * irregular heart beat - intermittent for the last 4 months    Just got back from [place] yesterday.  Was noticing this more while she was there    Happens several times per day, lasts not long, just a few beats and then a pause.  Does not keep her up at night.  No presyncope or syncope.  Has been normally active while in [place].  No symptoms while exerting herself.  No change in exercise tolerance    Has been going on for a long time, probably even longer then 4 months    * was diagnosed with a bone tumor over the fall.  Following with orthopedic oncology.  Planned repeat MRi in June and considering resection    *  BP elevated at recent visits over the last year.  Does not check BP at home.  No HA, vision changes or other concerns.    Problem list and histories reviewed & adjusted, as indicated.  Additional history: as documented    Reviewed and updated as needed this visit by clinical staff  Tobacco  Allergies  Meds  Med Hx  Surg Hx  Fam Hx  Soc Hx      Reviewed and updated as needed this visit by Provider  Tobacco  Allergies  Meds  Med Hx  Surg Hx  Fam Hx  Soc Hx        ROS: Remainder of Constitutional, CV, Respiratory, GI,  negative with exception of that mentioned above    PE:  VS as above   Gen:  WN/WD/WH female in NAD   Neck:  supple, no LAD appreciated   Heart:  RRR without murmur, nl S1, S2, no rubs or gallops   Lungs CTA marie without rales/ronchi/wheezes   Ext:  No pedal edema    EKG:  NSR, no evidence of arrhythmia or ischemia    A/P:      ICD-10-CM    1. Palpitations R00.2 TSH with free T4 reflex     CBC with platelets     Zio Patch Holter 48 Hour Adult Pediatric   2. Essential hypertension with goal blood pressure less than 140/90 I10 Comprehensive metabolic panel     Patient Instructions   Your EKG looked good today.  Please call [phone] to schedule your monitor.    We'll do  some blood work today as well and I will let you know results when I see them    It does appear that you have hypertension.  I would recommend we do some blood work to check the kidney and electrolytes.  Provided this is normal I would recommend beginning a medication like hydrochlorothiazide, a water pill, which is taken once daily.  I will let you know the results and formal plan through your [facility]

## 2019-03-01 NOTE — NURSING NOTE
"Initial /80   Pulse 80   Temp 98  F (36.7  C) (Tympanic)   Ht 1.619 m (5' 3.75\")   Wt 94.3 kg (208 lb)   Breastfeeding? No   BMI 35.98 kg/m   Estimated body mass index is 35.98 kg/m  as calculated from the following:    Height as of this encounter: 1.619 m (5' 3.75\").    Weight as of this encounter: 94.3 kg (208 lb). .      "

## 2019-03-07 ENCOUNTER — HOSPITAL ENCOUNTER (OUTPATIENT)
Dept: CARDIOLOGY | Facility: CLINIC | Age: 69
Discharge: HOME | End: 2019-03-07
Attending: FAMILY MEDICINE | Admitting: FAMILY MEDICINE
Payer: COMMERCIAL

## 2019-03-07 DIAGNOSIS — R00.2 PALPITATIONS: ICD-10-CM

## 2019-03-07 PROCEDURE — 0298T ZIO PATCH HOLTER ADULT PEDIATRIC GREATER THAN 48 HRS: CPT | Performed by: FAMILY MEDICINE

## 2019-03-07 PROCEDURE — 0296T ZIO PATCH HOLTER ADULT PEDIATRIC GREATER THAN 48 HRS: CPT

## 2019-03-15 ENCOUNTER — ALLIED HEALTH/NURSE VISIT (OUTPATIENT)
Dept: FAMILY MEDICINE | Facility: CLINIC | Age: 69
End: 2019-03-15
Payer: COMMERCIAL

## 2019-03-15 VITALS — SYSTOLIC BLOOD PRESSURE: 139 MMHG | HEART RATE: 65 BPM | DIASTOLIC BLOOD PRESSURE: 76 MMHG

## 2019-03-15 DIAGNOSIS — I10 BENIGN ESSENTIAL HYPERTENSION: Primary | ICD-10-CM

## 2019-03-15 PROCEDURE — 99207 ZZC NO CHARGE NURSE ONLY: CPT

## 2019-03-15 NOTE — PROGRESS NOTES
SUBJECTIVE:  Payton Gardiner is a 69 year old female who presents for a follow up evaluation of her hypertension.    The reason for the visit is:  an elevated blood pressure was noted elsewhere and they were told to come for a recheck    Patient is not taking medication   .  Patient is not monitoring Blood Pressure at home.  Average readings if yes are NA    Current complaints: none      Current Outpatient Medications   Medication     APPLE CIDER VINEGAR PO     ASPIRIN PO     Calcium Carb-Cholecalciferol (CALCIUM 600 + D PO)     Cholecalciferol (VITAMIN D) 2000 UNITS CAPS     FIBER PO     vitamin B complex with vitamin C (VITAMIN  B COMPLEX) TABS tablet     Current Facility-Administered Medications   Medication     ropivacaine (NAROPIN) injection 3 mL     triamcinolone acetonide (KENALOG-40) injection 40 mg       Allergies   Allergen Reactions     Hmg-Coa-R Inhibitors      Could hardly walk with STATINS     Adhesive Tape Rash     Macrodantin [Nitrofurantoin] Rash     Nickel Rash     Sulfa Drugs Rash         OBJECTIVE:  Please get a blood pressure AND a pulse.  A height is also needed if has not been done in the past year.    /76 (BP Location: Left arm, Patient Position: Chair, Cuff Size: Adult Large)   Pulse 65     Vitals as recorded, a large cuff was used.    ASSESSMENT:    Is the HYPERTENSION goal on the problem list? No  Patient Active Problem List   Diagnosis     Cataracts, bilateral     Alopecia areata     Breast cyst, left     Fibrocystic breast changes     Abnormal mammogram     Greater trochanteric bursitis, right     HCD (health care directive)     Hyperlipidemia     Low back pain     Osteoporosis     Patellofemoral syndrome     Postmenopausal     Sprain of wrist     Vitamin D deficiency     Abnormal glucose     BMI 30-40 with comorbid conditions. (IFG)       Plan:  The patient's blood pressure is less than documented goal. The patient will be discharged home. CC: this note to the patient's primary  provider. yes    The patient s blood pressure is higher than goal but is less than 180 systolically AND less that 110 diastolically. The patient will be discharged home.  A telephone encounter will be created with this note and sent to the patient's primary provider for action. NA    The patient's blood pressure is above 180 systolically OR is above 110 diastolically. The primary provider, RN, or an available provider will be consulted for immediate action. Keep the patient here for appropriate urgent action. NA

## 2019-03-20 ENCOUNTER — MYC MEDICAL ADVICE (OUTPATIENT)
Dept: FAMILY MEDICINE | Facility: CLINIC | Age: 69
End: 2019-03-20

## 2019-03-20 DIAGNOSIS — I10 ESSENTIAL HYPERTENSION WITH GOAL BLOOD PRESSURE LESS THAN 140/90: Primary | ICD-10-CM

## 2019-03-21 RX ORDER — HYDROCHLOROTHIAZIDE 12.5 MG/1
12.5 TABLET ORAL DAILY
Qty: 30 TABLET | Refills: 0 | Status: SHIPPED | OUTPATIENT
Start: 2019-03-21 | End: 2019-04-14

## 2019-03-28 DIAGNOSIS — M89.9 BONE LESION: Primary | ICD-10-CM

## 2019-04-04 ENCOUNTER — ALLIED HEALTH/NURSE VISIT (OUTPATIENT)
Dept: FAMILY MEDICINE | Facility: CLINIC | Age: 69
End: 2019-04-04
Payer: COMMERCIAL

## 2019-04-04 VITALS — DIASTOLIC BLOOD PRESSURE: 70 MMHG | SYSTOLIC BLOOD PRESSURE: 122 MMHG

## 2019-04-04 DIAGNOSIS — I10 ESSENTIAL HYPERTENSION WITH GOAL BLOOD PRESSURE LESS THAN 140/90: ICD-10-CM

## 2019-04-04 DIAGNOSIS — I10 BENIGN ESSENTIAL HYPERTENSION: Primary | ICD-10-CM

## 2019-04-04 LAB
ANION GAP SERPL CALCULATED.3IONS-SCNC: 4 MMOL/L (ref 3–14)
BUN SERPL-MCNC: 23 MG/DL (ref 7–30)
CALCIUM SERPL-MCNC: 9.5 MG/DL (ref 8.5–10.1)
CHLORIDE SERPL-SCNC: 100 MMOL/L (ref 94–109)
CO2 SERPL-SCNC: 32 MMOL/L (ref 20–32)
CREAT SERPL-MCNC: 0.79 MG/DL (ref 0.52–1.04)
GFR SERPL CREATININE-BSD FRML MDRD: 75 ML/MIN/{1.73_M2}
GLUCOSE SERPL-MCNC: 85 MG/DL (ref 70–99)
POTASSIUM SERPL-SCNC: 3.7 MMOL/L (ref 3.4–5.3)
SODIUM SERPL-SCNC: 136 MMOL/L (ref 133–144)

## 2019-04-04 PROCEDURE — 36415 COLL VENOUS BLD VENIPUNCTURE: CPT | Performed by: FAMILY MEDICINE

## 2019-04-04 PROCEDURE — 99207 ZZC NO CHARGE NURSE ONLY: CPT

## 2019-04-04 PROCEDURE — 80048 BASIC METABOLIC PNL TOTAL CA: CPT | Performed by: FAMILY MEDICINE

## 2019-04-04 NOTE — PROGRESS NOTES
SUBJECTIVE:  Payton Gardiner is a 69 year old female who presents for a follow up evaluation of her hypertension.    The reason for the visit is:  a recent medication change    Patient is taking medication as prescribed  Patient is tolerating medications well.  Patient is monitoring Blood Pressure at home.  Average readings if yes are 137/82    Current complaints: headaches and light-headedness but has gone away.      Current Outpatient Medications   Medication     APPLE CIDER VINEGAR PO     ASPIRIN PO     Calcium Carb-Cholecalciferol (CALCIUM 600 + D PO)     Cholecalciferol (VITAMIN D) 2000 UNITS CAPS     FIBER PO     hydrochlorothiazide (HYDRODIURIL) 12.5 MG tablet     vitamin B complex with vitamin C (VITAMIN  B COMPLEX) TABS tablet     Current Facility-Administered Medications   Medication     ropivacaine (NAROPIN) injection 3 mL     triamcinolone acetonide (KENALOG-40) injection 40 mg       Allergies   Allergen Reactions     Hmg-Coa-R Inhibitors      Could hardly walk with STATINS     Adhesive Tape Rash     Macrodantin [Nitrofurantoin] Rash     Nickel Rash     Sulfa Drugs Rash         OBJECTIVE:  Please get a blood pressure AND a pulse.  A height is also needed if has not been done in the past year.    /70 (BP Location: Right arm, Patient Position: Chair, Cuff Size: Adult Large)     Vitals as recorded, a large cuff was used.    ASSESSMENT:    Is the HYPERTENSION goal on the problem list? No  Patient Active Problem List   Diagnosis     Cataracts, bilateral     Alopecia areata     Breast cyst, left     Fibrocystic breast changes     Abnormal mammogram     Greater trochanteric bursitis, right     HCD (health care directive)     Hyperlipidemia     Low back pain     Osteoporosis     Patellofemoral syndrome     Postmenopausal     Sprain of wrist     Vitamin D deficiency     Abnormal glucose     BMI 30-40 with comorbid conditions. (IFG)       Plan:  The patient's blood pressure is less than documented goal. The  patient will be discharged home. CC: this note to the patient's primary provider. YES    The patient s blood pressure is higher than goal but is less than 180 systolically AND less that 110 diastolically. The patient will be discharged home.  A telephone encounter will be created with this note and sent to the patient's primary provider for action. NA    The patient's blood pressure is above 180 systolically OR is above 110 diastolically. The primary provider, RN, or an available provider will be consulted for immediate action. Keep the patient here for appropriate urgent action. NA

## 2019-04-14 DIAGNOSIS — I10 ESSENTIAL HYPERTENSION WITH GOAL BLOOD PRESSURE LESS THAN 140/90: ICD-10-CM

## 2019-04-15 NOTE — TELEPHONE ENCOUNTER
"Requested Prescriptions   Pending Prescriptions Disp Refills     hydrochlorothiazide (HYDRODIURIL) 12.5 MG tablet [Pharmacy Med Name: HYDROCHLOROT 12.5MG  TAB]  Last Written Prescription Date:  03/21/2019 #30 x 0  Last filled 03/22/2019  Last office visit: 03/01/2019 RAYSA Frazier     Future Office Visit:   Next 5 appointments (look out 90 days)    May 23, 2019  9:40 AM CDT  PHYSICAL with Araseli Frazier,   [facility] [address] [id number] [phone]          30 tablet 0     Sig: TAKE 1 TABLET BY MOUTH ONCE DAILY       Diuretics (Including Combos) Protocol Passed - 4/14/2019  8:19 AM        Passed - Blood pressure under 140/90 in past 12 months     BP Readings from Last 3 Encounters:   04/04/19 122/70   03/15/19 139/76   03/01/19 150/80                 Passed - Recent (12 mo) or future (30 days) visit within the authorizing provider's specialty     Patient had office visit in the last 12 months or has a visit in the next 30 days with authorizing provider or within the authorizing provider's specialty.  See \"Patient Info\" tab in inbasket, or \"Choose Columns\" in Meds & Orders section of the refill encounter.              Passed - Medication is active on med list        Passed - Patient is age 18 or older        Passed - No active pregancy on record        Passed - Normal serum creatinine on file in past 12 months     Recent Labs   Lab Test 04/04/19  0958   CR 0.79              Passed - Normal serum potassium on file in past 12 months     Recent Labs   Lab Test 04/04/19  0958   POTASSIUM 3.7                    Passed - Normal serum sodium on file in past 12 months     Recent Labs   Lab Test 04/04/19  0958                 Passed - No positive pregnancy test in past 12 months          "

## 2019-04-16 RX ORDER — HYDROCHLOROTHIAZIDE 12.5 MG/1
TABLET ORAL
Qty: 90 TABLET | Refills: 1 | Status: SHIPPED | OUTPATIENT
Start: 2019-04-16 | End: 2019-10-11

## 2019-05-22 ASSESSMENT — ENCOUNTER SYMPTOMS
JOINT SWELLING: 0
HEADACHES: 0
ABDOMINAL PAIN: 0
ARTHRALGIAS: 1
DIARRHEA: 0
FREQUENCY: 0
FEVER: 0
COUGH: 0
MYALGIAS: 0
EYE PAIN: 0
HEMATOCHEZIA: 0
HEARTBURN: 1
NERVOUS/ANXIOUS: 0
BREAST MASS: 0
SHORTNESS OF BREATH: 0
PALPITATIONS: 0
HEMATURIA: 0
WEAKNESS: 0
DYSURIA: 0
CONSTIPATION: 0
SORE THROAT: 0
CHILLS: 0
PARESTHESIAS: 0
NAUSEA: 0
DIZZINESS: 0

## 2019-05-22 ASSESSMENT — ACTIVITIES OF DAILY LIVING (ADL): CURRENT_FUNCTION: NO ASSISTANCE NEEDED

## 2019-05-23 ENCOUNTER — OFFICE VISIT (OUTPATIENT)
Dept: FAMILY MEDICINE | Facility: CLINIC | Age: 69
End: 2019-05-23
Payer: COMMERCIAL

## 2019-05-23 VITALS
SYSTOLIC BLOOD PRESSURE: 128 MMHG | HEIGHT: 64 IN | HEART RATE: 76 BPM | BODY MASS INDEX: 33.84 KG/M2 | DIASTOLIC BLOOD PRESSURE: 74 MMHG | WEIGHT: 198.2 LBS | TEMPERATURE: 98.1 F

## 2019-05-23 DIAGNOSIS — E66.01 MORBID OBESITY (H): ICD-10-CM

## 2019-05-23 DIAGNOSIS — E78.5 HYPERLIPIDEMIA, UNSPECIFIED HYPERLIPIDEMIA TYPE: ICD-10-CM

## 2019-05-23 DIAGNOSIS — Z87.891 PERSONAL HISTORY OF TOBACCO USE: ICD-10-CM

## 2019-05-23 DIAGNOSIS — I10 ESSENTIAL HYPERTENSION WITH GOAL BLOOD PRESSURE LESS THAN 140/90: ICD-10-CM

## 2019-05-23 DIAGNOSIS — Z00.00 MEDICARE ANNUAL WELLNESS VISIT, SUBSEQUENT: Primary | ICD-10-CM

## 2019-05-23 DIAGNOSIS — R73.09 ABNORMAL GLUCOSE: ICD-10-CM

## 2019-05-23 LAB
ALBUMIN SERPL-MCNC: 3.7 G/DL (ref 3.4–5)
ALP SERPL-CCNC: 63 U/L (ref 40–150)
ALT SERPL W P-5'-P-CCNC: 21 U/L (ref 0–50)
ANION GAP SERPL CALCULATED.3IONS-SCNC: 3 MMOL/L (ref 3–14)
AST SERPL W P-5'-P-CCNC: 20 U/L (ref 0–45)
BILIRUB SERPL-MCNC: 0.7 MG/DL (ref 0.2–1.3)
BUN SERPL-MCNC: 20 MG/DL (ref 7–30)
CALCIUM SERPL-MCNC: 9.7 MG/DL (ref 8.5–10.1)
CHLORIDE SERPL-SCNC: 103 MMOL/L (ref 94–109)
CHOLEST SERPL-MCNC: 223 MG/DL
CO2 SERPL-SCNC: 30 MMOL/L (ref 20–32)
CREAT SERPL-MCNC: 0.77 MG/DL (ref 0.52–1.04)
GFR SERPL CREATININE-BSD FRML MDRD: 77 ML/MIN/{1.73_M2}
GLUCOSE SERPL-MCNC: 94 MG/DL (ref 70–99)
HBA1C MFR BLD: 5.2 % (ref 0–5.6)
HDLC SERPL-MCNC: 75 MG/DL
LDLC SERPL CALC-MCNC: 124 MG/DL
NONHDLC SERPL-MCNC: 148 MG/DL
POTASSIUM SERPL-SCNC: 3.7 MMOL/L (ref 3.4–5.3)
PROT SERPL-MCNC: 7.8 G/DL (ref 6.8–8.8)
SODIUM SERPL-SCNC: 136 MMOL/L (ref 133–144)
TRIGL SERPL-MCNC: 118 MG/DL

## 2019-05-23 PROCEDURE — 80061 LIPID PANEL: CPT | Performed by: FAMILY MEDICINE

## 2019-05-23 PROCEDURE — G0438 PPPS, INITIAL VISIT: HCPCS | Performed by: FAMILY MEDICINE

## 2019-05-23 PROCEDURE — 36415 COLL VENOUS BLD VENIPUNCTURE: CPT | Performed by: FAMILY MEDICINE

## 2019-05-23 PROCEDURE — 83036 HEMOGLOBIN GLYCOSYLATED A1C: CPT | Performed by: FAMILY MEDICINE

## 2019-05-23 PROCEDURE — 80053 COMPREHEN METABOLIC PANEL: CPT | Performed by: FAMILY MEDICINE

## 2019-05-23 ASSESSMENT — ENCOUNTER SYMPTOMS
BREAST MASS: 0
HEADACHES: 0
CHILLS: 0
PARESTHESIAS: 0
HEMATOCHEZIA: 0
COUGH: 0
SORE THROAT: 0
DYSURIA: 0
HEARTBURN: 1
MYALGIAS: 0
SHORTNESS OF BREATH: 0
CONSTIPATION: 0
ABDOMINAL PAIN: 0
DIZZINESS: 0
NAUSEA: 0
FEVER: 0
PALPITATIONS: 0
NERVOUS/ANXIOUS: 0
WEAKNESS: 0
JOINT SWELLING: 0
EYE PAIN: 0
ARTHRALGIAS: 1
FREQUENCY: 0
HEMATURIA: 0
DIARRHEA: 0

## 2019-05-23 ASSESSMENT — MIFFLIN-ST. JEOR: SCORE: 1399.03

## 2019-05-23 ASSESSMENT — ACTIVITIES OF DAILY LIVING (ADL): CURRENT_FUNCTION: NO ASSISTANCE NEEDED

## 2019-05-23 NOTE — PATIENT INSTRUCTIONS
Preventive Health Recommendations    See your health care provider every year to    Review health changes.     Discuss preventive care.      Review your medicines if your doctor has prescribed any.    You no longer need a yearly Pap test unless you've had an abnormal Pap test in the past 10 years. If you have vaginal symptoms, such as bleeding or discharge, be sure to talk with your provider about a Pap test.    Every 1 to 2 years, have a mammogram.  If you are over 69, talk with your health care provider about whether or not you want to continue having screening mammograms.    Every 10 years, have a colonoscopy. Or, have a yearly FIT test (stool test). These exams will check for colon cancer.     Have a cholesterol test every 5 years, or more often if your doctor advises it.     Have a diabetes test (fasting glucose) every three years. If you are at risk for diabetes, you should have this test more often.     At age 65, have a bone density scan (DEXA) to check for osteoporosis (brittle bone disease).    Shots:    Get a flu shot each year.    Get a tetanus shot every 10 years.    Talk to your doctor about your pneumonia vaccines. There are now two you should receive - Pneumovax (PPSV 23) and Prevnar (PCV 13).    Talk to your pharmacist about the shingles vaccine.    Talk to your doctor about the hepatitis B vaccine.    Nutrition:     Eat at least 5 servings of fruits and vegetables each day.    Eat whole-grain bread, whole-wheat pasta and brown rice instead of white grains and rice.    Get adequate Calcium and Vitamin D.     Lifestyle    Exercise at least 150 minutes a week (30 minutes a day, 5 days a week). This will help you control your weight and prevent disease.    Limit alcohol to one drink per day.    No smoking.     Wear sunscreen to prevent skin cancer.     See your dentist twice a year for an exam and cleaning.    See your eye doctor every 1 to 2 years to screen for conditions such as glaucoma, macular  degeneration and cataracts.

## 2019-05-23 NOTE — PROGRESS NOTES
"SUBJECTIVE:   Payton Gardiner is a 69 year old female who presents for Preventive Visit.    Are you in the first 12 months of your Medicare coverage?  No    Healthy Habits:     In general, how would you rate your overall health?  Good    Frequency of exercise:  2-3 days/week    Duration of exercise:  N/A    Do you usually eat at least 4 servings of fruit and vegetables a day, include whole grains    & fiber and avoid regularly eating high fat or \"junk\" foods?  No    Taking medications regularly:  Yes    Medication side effects:  None    Ability to successfully perform activities of daily living:  No assistance needed    Home Safety:  No safety concerns identified    Hearing Impairment:  Feel that people are mumbling or not speaking clearly    In the past 6 months, have you been bothered by leaking of urine?  No    In general, how would you rate your overall mental or emotional health?  Good      PHQ-2 Total Score: 0    Additional concerns today:  No    Do you feel safe in your environment? Yes    Do you have a Health Care Directive? Yes: Advance Directive has been received and scanned.      Fall risk  Fallen 2 or more times in the past year?: No  Any fall with injury in the past year?: No    Cognitive Screening   1) Repeat 3 items (Leader, Season, Table)    2) Clock draw: NORMAL  3) 3 item recall: Recalls 3 objects  Results: 3 items recalled: COGNITIVE IMPAIRMENT LESS LIKELY    Mini-CogTM Copyright RY Briceno. Licensed by the author for use in [facility]; reprinted with permission (beronica@.[facility]). All rights reserved.      Do you have sleep apnea, excessive snoring or daytime drowsiness?: no    Reviewed and updated as needed this visit by clinical staff  Tobacco  Allergies  Meds  Med Hx  Surg Hx  Fam Hx  Soc Hx        Reviewed and updated as needed this visit by Provider  Tobacco  Meds  Med Hx  Surg Hx  Fam Hx  Soc Hx       Social History     Tobacco Use     Smoking status: Former Smoker     " Smokeless tobacco: Never Used     Tobacco comment: couple of cigs per day for about 30 years.   Substance Use Topics     Alcohol use: Yes     Alcohol/week: 0.0 oz     Comment: socially         Alcohol Use 5/22/2019   Prescreen: >3 drinks/day or >7 drinks/week? No   Prescreen: >3 drinks/day or >7 drinks/week? -       Current providers sharing in care for this patient include:   Patient Care Team:  Araseli Frazier DO as PCP - General (Family Practice)  Luiz Acevedo DO as Referring Physician (Family Medicine - Sports Medicine)  Ilir Henning MD as MD (Orthopaedic Surgery)  Araseli Frazier DO as Assigned PCP    The following health maintenance items are reviewed in Epic and correct as of today:  Health Maintenance   Topic Date Due     ZOSTER IMMUNIZATION (2 of 3) 03/02/2010     MEDICARE ANNUAL WELLNESS VISIT  05/07/2019     DEXA  06/01/2019     FALL RISK ASSESSMENT  05/23/2020     MAMMO SCREENING  06/12/2020     ADVANCED DIRECTIVE PLANNING  09/24/2020     DTAP/TDAP/TD IMMUNIZATION (2 - Td) 01/10/2021     COLONOSCOPY  08/04/2021     LIPID  05/23/2024     HEPATITIS C SCREENING  Completed     PHQ-2  Completed     INFLUENZA VACCINE  Completed     PNEUMOCOCCAL IMMUNIZATION 65+ LOW/MEDIUM RISK  Completed     IPV IMMUNIZATION  Aged Out     MENINGITIS IMMUNIZATION  Aged Out       Review of Systems   Constitutional: Negative for chills and fever.   HENT: Positive for hearing loss. Negative for congestion, ear pain and sore throat.    Eyes: Negative for pain and visual disturbance.   Respiratory: Negative for cough and shortness of breath.    Cardiovascular: Negative for chest pain, palpitations and peripheral edema.   Gastrointestinal: Positive for heartburn. Negative for abdominal pain, constipation, diarrhea, hematochezia and nausea.   Breasts:  Negative for tenderness, breast mass and discharge.   Genitourinary: Negative for dysuria, frequency, genital sores, hematuria, pelvic pain, urgency,  "vaginal bleeding and vaginal discharge.   Musculoskeletal: Positive for arthralgias. Negative for joint swelling and myalgias.   Skin: Negative for rash.   Neurological: Negative for dizziness, weakness, headaches and paresthesias.   Psychiatric/Behavioral: Negative for mood changes. The patient is not nervous/anxious.          OBJECTIVE:   /74   Pulse 76   Temp 98.1  F (36.7  C) (Tympanic)   Ht 1.626 m (5' 4\")   Wt 89.9 kg (198 lb 3.2 oz)   Breastfeeding? No   BMI 34.02 kg/m   Estimated body mass index is 34.02 kg/m  as calculated from the following:    Height as of this encounter: 1.626 m (5' 4\").    Weight as of this encounter: 89.9 kg (198 lb 3.2 oz).  Physical Exam  GENERAL APPEARANCE: healthy, alert and no distress  EYES: Eyes grossly normal to inspection, PERRL and conjunctivae and sclerae normal  HENT: ear canals and TM's normal, nose and mouth without ulcers or lesions, oropharynx clear and oral mucous membranes moist  NECK: no adenopathy, no asymmetry, masses, or scars and thyroid normal to palpation  RESP: lungs clear to auscultation - no rales, rhonchi or wheezes  BREAST: declined by pt  CV: regular rate and rhythm, normal S1 S2, no S3 or S4, no murmur, click or rub, no peripheral edema and peripheral pulses strong  ABDOMEN: soft, nontender, no hepatosplenomegaly, no masses and bowel sounds normal  MS: no musculoskeletal defects are noted and gait is age appropriate without ataxia  NEURO: Normal strength and tone, sensory exam grossly normal, mentation intact and speech normal  PSYCH: mentation appears normal and affect normal/bright    Diagnostic Test Results:  Labs reviewed in Epic    ASSESSMENT / PLAN:       ICD-10-CM    1. Medicare annual wellness visit, subsequent Z00.00    2. Essential hypertension with goal blood pressure less than 140/90 I10 Comprehensive metabolic panel   3. Hyperlipidemia, unspecified hyperlipidemia type E78.5 Lipid panel reflex to direct LDL Fasting     " "Comprehensive metabolic panel   4. Abnormal glucose R73.09 Hemoglobin A1c     Comprehensive metabolic panel   5. Morbid obesity (H) E66.01    6. Personal history of tobacco use Z87.891        End of Life Planning:  Patient currently has an advanced directive: Yes.  Practitioner is supportive of decision.    COUNSELING:  Reviewed preventive health counseling, as reflected in patient instructions  Special attention given to:       Regular exercise       Healthy diet/nutrition       Bladder control       Osteoporosis Prevention/Bone Health       Colon cancer screening    Estimated body mass index is 34.02 kg/m  as calculated from the following:    Height as of this encounter: 1.626 m (5' 4\").    Weight as of this encounter: 89.9 kg (198 lb 3.2 oz).    Weight management plan: Discussed healthy diet and exercise guidelines     reports that she has quit smoking. She has never used smokeless tobacco.      Appropriate preventive services were discussed with this patient, including applicable screening as appropriate for cardiovascular disease, diabetes, osteopenia/osteoporosis, and glaucoma.  As appropriate for age/gender, discussed screening for colorectal cancer, prostate cancer, breast cancer, and cervical cancer. Checklist reviewing preventive services available has been given to the patient.    Reviewed patients plan of care and provided an AVS. The Intermediate Care Plan ( asthma action plan, low back pain action plan, and migraine action plan) for Payton meets the Care Plan requirement. This Care Plan has been established and reviewed with the Patient.    Counseling Resources:  ATP IV Guidelines  Pooled Cohorts Equation Calculator  Breast Cancer Risk Calculator  FRAX Risk Assessment  ICSI Preventive Guidelines  Dietary Guidelines for Americans, 2010  USDA's MyPlate  ASA Prophylaxis  Lung CA Screening    Araseli Frazier,   [facility]    Identified Health Risks:    Patient Instructions   Preventive " Health Recommendations    See your health care provider every year to    Review health changes.     Discuss preventive care.      Review your medicines if your doctor has prescribed any.    You no longer need a yearly Pap test unless you've had an abnormal Pap test in the past 10 years. If you have vaginal symptoms, such as bleeding or discharge, be sure to talk with your provider about a Pap test.    Every 1 to 2 years, have a mammogram.  If you are over 69, talk with your health care provider about whether or not you want to continue having screening mammograms.    Every 10 years, have a colonoscopy. Or, have a yearly FIT test (stool test). These exams will check for colon cancer.     Have a cholesterol test every 5 years, or more often if your doctor advises it.     Have a diabetes test (fasting glucose) every three years. If you are at risk for diabetes, you should have this test more often.     At age 65, have a bone density scan (DEXA) to check for osteoporosis (brittle bone disease).    Shots:    Get a flu shot each year.    Get a tetanus shot every 10 years.    Talk to your doctor about your pneumonia vaccines. There are now two you should receive - Pneumovax (PPSV 23) and Prevnar (PCV 13).    Talk to your pharmacist about the shingles vaccine.    Talk to your doctor about the hepatitis B vaccine.    Nutrition:     Eat at least 5 servings of fruits and vegetables each day.    Eat whole-grain bread, whole-wheat pasta and brown rice instead of white grains and rice.    Get adequate Calcium and Vitamin D.     Lifestyle    Exercise at least 150 minutes a week (30 minutes a day, 5 days a week). This will help you control your weight and prevent disease.    Limit alcohol to one drink per day.    No smoking.     Wear sunscreen to prevent skin cancer.     See your dentist twice a year for an exam and cleaning.    See your eye doctor every 1 to 2 years to screen for conditions such as glaucoma, macular degeneration  and cataracts.        Lung Cancer Screening Shared Decision Making Visit     Payton Gardiner is not eligible for lung cancer screening on the basis of the information provided in my signed lung cancer screening order. Payton's smoking history is below the threshold and so it is not recommended.    Patient is not currently a smoker and so we did not discuss that the only way to prevent lung cancer is to not smoke. Smoking cessation assistance was not offered.    ShouldIScreen

## 2019-05-23 NOTE — NURSING NOTE
"Initial /74   Pulse 76   Temp 98.1  F (36.7  C) (Tympanic)   Ht 1.626 m (5' 4\")   Wt 89.9 kg (198 lb 3.2 oz)   Breastfeeding? No   BMI 34.02 kg/m   Estimated body mass index is 34.02 kg/m  as calculated from the following:    Height as of this encounter: 1.626 m (5' 4\").    Weight as of this encounter: 89.9 kg (198 lb 3.2 oz). .      "

## 2019-05-28 ENCOUNTER — MYC MEDICAL ADVICE (OUTPATIENT)
Dept: FAMILY MEDICINE | Facility: CLINIC | Age: 69
End: 2019-05-28

## 2019-05-31 ASSESSMENT — ENCOUNTER SYMPTOMS
EYE IRRITATION: 0
EYE WATERING: 0
EYE REDNESS: 0
DOUBLE VISION: 0
EYE PAIN: 0

## 2019-06-12 ENCOUNTER — ANCILLARY PROCEDURE (OUTPATIENT)
Dept: MRI IMAGING | Facility: CLINIC | Age: 69
End: 2019-06-12
Attending: ORTHOPAEDIC SURGERY
Payer: COMMERCIAL

## 2019-06-12 ENCOUNTER — OFFICE VISIT (OUTPATIENT)
Dept: ORTHOPEDICS | Facility: CLINIC | Age: 69
End: 2019-06-12
Payer: COMMERCIAL

## 2019-06-12 VITALS — BODY MASS INDEX: 34.47 KG/M2 | WEIGHT: 201.9 LBS | HEIGHT: 64 IN

## 2019-06-12 DIAGNOSIS — M89.9 BONE LESION: ICD-10-CM

## 2019-06-12 DIAGNOSIS — M89.9 BONE LESION: Primary | ICD-10-CM

## 2019-06-12 ASSESSMENT — MIFFLIN-ST. JEOR: SCORE: 1415.81

## 2019-06-12 NOTE — LETTER
6/12/2019       RE: Payton Gardiner  [address] [place] [address]     Dear Colleague,    Thank you for referring your patient, Payton Gardiner, to [facility]. Please see a copy of my visit note below.    This 69-year-old woman has some pain when doing squats and complains of some anterior medial joint line pain or tibial plateau pain.  This is all on the right knee.  She presents today to review an MRI of her bone lesion in the right distal femur to see if it has changed.  Her symptoms have not changed.  She is avoiding doing anything strenuous however.  I reviewed her patient survey information in the EMR.    On examination she is alert oriented has a normal mood and affect and is in no acute distress.  In her right knee she does have some mild anterior joint line tenderness but is nontender over the medial condyle and epicondyle she has a minimal effusion.  There is no obvious edema in the leg.    I reviewed the MRI which shows apparently calcified enchondroma that is unchanged over the last 6 months.    I recommended this patient return to activity as tolerated.  An x-ray of the enchondroma could be done in the year to confirm that it stable or work-up should be done sooner if the patient develops a swelling or new symptoms in the area of the lesion.  I have answered all of her questions.    Again, thank you for allowing me to participate in the care of your patient.      Sincerely,    Ilir Henning MD

## 2019-06-12 NOTE — NURSING NOTE
"Reason For Visit:   Chief Complaint   Patient presents with     Right Knee - Lesion     Lesion     Pt presents for a follow up for a lesion in the right knee.       Ht 1.626 m (5' 4\")   Wt 91.6 kg (201 lb 14.4 oz)   BMI 34.66 kg/m      Pain Assessment  Patient Currently in Pain: Yes  Patient's Stated Pain Goal: No pain  0-10 Pain Scale: 3  Primary Pain Location: Knee  Pain Descriptors: Sore  Alleviating Factors: Rest  Aggravating Factors: Movement    Boris Mccarthy EMT    "

## 2019-06-12 NOTE — PROGRESS NOTES
This 69-year-old woman has some pain when doing squats and complains of some anterior medial joint line pain or tibial plateau pain.  This is all on the right knee.  She presents today to review an MRI of her bone lesion in the right distal femur to see if it has changed.  Her symptoms have not changed.  She is avoiding doing anything strenuous however.  I reviewed her patient survey information in the EMR.    On examination she is alert oriented has a normal mood and affect and is in no acute distress.  In her right knee she does have some mild anterior joint line tenderness but is nontender over the medial condyle and epicondyle she has a minimal effusion.  There is no obvious edema in the leg.    I reviewed the MRI which shows apparently calcified enchondroma that is unchanged over the last 6 months.    I recommended this patient return to activity as tolerated.  An x-ray of the enchondroma could be done in the year to confirm that it stable or work-up should be done sooner if the patient develops a swelling or new symptoms in the area of the lesion.  I have answered all of her questions.

## 2019-09-26 ENCOUNTER — OFFICE VISIT (OUTPATIENT)
Dept: FAMILY MEDICINE | Facility: CLINIC | Age: 69
End: 2019-09-26
Payer: COMMERCIAL

## 2019-09-26 VITALS
DIASTOLIC BLOOD PRESSURE: 90 MMHG | HEART RATE: 60 BPM | SYSTOLIC BLOOD PRESSURE: 142 MMHG | BODY MASS INDEX: 34.08 KG/M2 | WEIGHT: 199.6 LBS | TEMPERATURE: 97.8 F | RESPIRATION RATE: 12 BRPM | HEIGHT: 64 IN

## 2019-09-26 DIAGNOSIS — L98.9 SKIN LESION: Primary | ICD-10-CM

## 2019-09-26 PROCEDURE — 99213 OFFICE O/P EST LOW 20 MIN: CPT | Mod: 25 | Performed by: FAMILY MEDICINE

## 2019-09-26 PROCEDURE — 90662 IIV NO PRSV INCREASED AG IM: CPT | Performed by: FAMILY MEDICINE

## 2019-09-26 PROCEDURE — G0008 ADMIN INFLUENZA VIRUS VAC: HCPCS | Performed by: FAMILY MEDICINE

## 2019-09-26 ASSESSMENT — MIFFLIN-ST. JEOR: SCORE: 1405.38

## 2019-09-26 NOTE — PATIENT INSTRUCTIONS
The spot on the nose looks okay to me, just sun damage.    The spot on the chest is more concerning for a possible skin cancer.  We'll biopsy that one for you.

## 2019-09-26 NOTE — PROGRESS NOTES
Subjective     Payton Gardiner is a 69 year old female who presents to clinic today for the following health issues:    HPI     * red spot on chest and discoloration on nose    Spot on chest has been present for months.  Small open area, will bleed if scratched or rubbed    Has a discoloration on nose since she was in [place] over the winter.  No pain or bleeding/flaking of the nose lesion.       Reviewed and updated as needed this visit by Provider         Review of Systems   ROS COMP: Constitutional, HEENT, cardiovascular, pulmonary, gi and gu systems are negative, except as otherwise noted.      Objective    There were no vitals taken for this visit.  There is no height or weight on file to calculate BMI.  Physical Exam   PE:  VS as above   Skin:  Small area of brownish discoloration on nose.  No scale or superficial skin changes.  Small mildly erythematous, raised slightly scaly lesion on mid chest    Diagnostic Test Results:  none         A/p:      ICD-10-CM    1. Skin lesion L98.9      Patient Instructions   The spot on the nose looks okay to me, just sun damage.    The spot on the chest is more concerning for a possible skin cancer.  We'll biopsy that one for you.

## 2019-10-11 ENCOUNTER — OFFICE VISIT (OUTPATIENT)
Dept: FAMILY MEDICINE | Facility: CLINIC | Age: 69
End: 2019-10-11
Payer: COMMERCIAL

## 2019-10-11 VITALS
HEART RATE: 60 BPM | RESPIRATION RATE: 12 BRPM | WEIGHT: 200 LBS | TEMPERATURE: 98 F | BODY MASS INDEX: 34.15 KG/M2 | SYSTOLIC BLOOD PRESSURE: 132 MMHG | HEIGHT: 64 IN | DIASTOLIC BLOOD PRESSURE: 80 MMHG

## 2019-10-11 DIAGNOSIS — L57.0 AK (ACTINIC KERATOSIS): ICD-10-CM

## 2019-10-11 DIAGNOSIS — D22.9 SKIN MOLE: Primary | ICD-10-CM

## 2019-10-11 DIAGNOSIS — I10 ESSENTIAL HYPERTENSION WITH GOAL BLOOD PRESSURE LESS THAN 140/90: ICD-10-CM

## 2019-10-11 PROCEDURE — 11301 SHAVE SKIN LESION 0.6-1.0 CM: CPT | Performed by: FAMILY MEDICINE

## 2019-10-11 PROCEDURE — 88342 IMHCHEM/IMCYTCHM 1ST ANTB: CPT | Mod: 26 | Performed by: FAMILY MEDICINE

## 2019-10-11 PROCEDURE — 88305 TISSUE EXAM BY PATHOLOGIST: CPT | Performed by: FAMILY MEDICINE

## 2019-10-11 PROCEDURE — 88305 TISSUE EXAM BY PATHOLOGIST: CPT | Mod: 26 | Performed by: FAMILY MEDICINE

## 2019-10-11 PROCEDURE — 88341 IMHCHEM/IMCYTCHM EA ADD ANTB: CPT | Mod: 26 | Performed by: FAMILY MEDICINE

## 2019-10-11 PROCEDURE — 88341 IMHCHEM/IMCYTCHM EA ADD ANTB: CPT | Performed by: FAMILY MEDICINE

## 2019-10-11 PROCEDURE — 17000 DESTRUCT PREMALG LESION: CPT | Mod: 59 | Performed by: FAMILY MEDICINE

## 2019-10-11 PROCEDURE — 88342 IMHCHEM/IMCYTCHM 1ST ANTB: CPT | Performed by: FAMILY MEDICINE

## 2019-10-11 ASSESSMENT — MIFFLIN-ST. JEOR: SCORE: 1407.19

## 2019-10-11 NOTE — NURSING NOTE
"Initial /80   Pulse 60   Temp 98  F (36.7  C) (Tympanic)   Resp 12   Ht 1.626 m (5' 4\")   Wt 90.7 kg (200 lb)   Breastfeeding? No   BMI 34.33 kg/m   Estimated body mass index is 34.33 kg/m  as calculated from the following:    Height as of this encounter: 1.626 m (5' 4\").    Weight as of this encounter: 90.7 kg (200 lb). .      "

## 2019-10-11 NOTE — PROGRESS NOTES
"Subjective     Payton Gardiner is a 69 year old female who presents to clinic today for the following health issues:    HPI     * remove lesion on chest     After last visit ended up covering her chest lesion with a bandaid.  Seems to have mostly healed now, just a rough spot on her chest.      Does have a broad based tag on her L chest which catches on seat belts and necklaces, would like that removed as well.    Informed consent obtained including risk of bleeding, infection, scarring nd lack of resolution.  All questions answered.        Reviewed and updated as needed this visit by Provider  Tobacco  Allergies  Meds  Med Hx  Surg Hx  Fam Hx  Soc Hx        Review of Systems   ROS COMP: Constitutional, HEENT, cardiovascular, pulmonary, gi and gu systems are negative, except as otherwise noted.      Objective    /80   Pulse 60   Temp 98  F (36.7  C) (Tympanic)   Resp 12   Ht 1.626 m (5' 4\")   Wt 90.7 kg (200 lb)   Breastfeeding? No   BMI 34.33 kg/m    Body mass index is 34.33 kg/m .  Physical Exam   PE:  VS as above   Gen:  WN/WD/WH female in NAD   Skin:  approx 1cm flesh colored raised mole on L upper chest.  Small gritty spot on anterior chest c/w AK      Procedure:  Following informed consent AK on anterior chest treated with liquid nitrogen.  Raised mole then injected with 1% lidocaine with epi.  Prepped with betadine times 3.  Removed using straight blade.  Hemostasis achieved with pressure and drysol.  Antibiotic ointment and bandaid applied.  Pt tolerated procedure well.    Diagnostic Test Results:  none         A/P:      ICD-10-CM    1. Skin mole D22.9 SHAV SKIN LESION TRUNK/ARM/LEG 0.6-1.0 CM     Surgical pathology exam   2. AK (actinic keratosis) L57.0 DESTRUCT PREMALIGNANT LESION, FIRST     Patient Instructions   I would recommend keeping the areas covered until healed with a bandaid and some antibiotic ointment.    You can change this daily after showering.      Let me know if you have " trouble with increasing redness, drainage or pain to the areas.      If the spot we froze does not heal completely or returns we should biopsy it.

## 2019-10-11 NOTE — PATIENT INSTRUCTIONS
I would recommend keeping the areas covered until healed with a bandaid and some antibiotic ointment.    You can change this daily after showering.      Let me know if you have trouble with increasing redness, drainage or pain to the areas.      If the spot we froze does not heal completely or returns we should biopsy it.

## 2019-10-15 RX ORDER — HYDROCHLOROTHIAZIDE 12.5 MG/1
TABLET ORAL
Qty: 90 TABLET | Refills: 1 | Status: SHIPPED | OUTPATIENT
Start: 2019-10-15 | End: 2020-03-30

## 2019-10-15 NOTE — TELEPHONE ENCOUNTER
"Requested Prescriptions   Pending Prescriptions Disp Refills     hydrochlorothiazide (HYDRODIURIL) 12.5 MG tablet [Pharmacy Med Name: HYDROCHLOROT 12.5MG  TAB] 90 tablet 1     Sig: TAKE 1 TABLET BY MOUTH ONCE DAILY  Last Written Prescription Date:  04/16/2019 #90 x 1  Last filled 07/04/2019  Last office visit: 10/11/2019 RAYSA Frazier   Future Office Visit:  None         Diuretics (Including Combos) Protocol Passed - 10/11/2019 11:46 AM        Passed - Blood pressure under 140/90 in past 12 months     BP Readings from Last 3 Encounters:   10/11/19 132/80   09/26/19 (!) 142/90   05/23/19 128/74                 Passed - Recent (12 mo) or future (30 days) visit within the authorizing provider's specialty     Patient has had an office visit with the authorizing provider or a provider within the authorizing providers department within the previous 12 mos or has a future within next 30 days. See \"Patient Info\" tab in inbasket, or \"Choose Columns\" in Meds & Orders section of the refill encounter.              Passed - Medication is active on med list        Passed - Patient is age 18 or older        Passed - No active pregancy on record        Passed - Normal serum creatinine on file in past 12 months     Recent Labs   Lab Test 05/23/19  1051   CR 0.77              Passed - Normal serum potassium on file in past 12 months     Recent Labs   Lab Test 05/23/19  1051   POTASSIUM 3.7                    Passed - Normal serum sodium on file in past 12 months     Recent Labs   Lab Test 05/23/19  1051                 Passed - No positive pregnancy test in past 12 months          "

## 2019-10-15 NOTE — TELEPHONE ENCOUNTER
Routing refill request to provider for review/approval because:  Patient recently seen in clinic.    Laquita Dawn RN

## 2019-10-15 NOTE — TELEPHONE ENCOUNTER
Prescription approved per [facility].  Stefany Ordonez RN

## 2019-10-16 LAB — COPATH REPORT: NORMAL

## 2019-11-01 ENCOUNTER — TELEPHONE (OUTPATIENT)
Dept: ORTHOPEDICS | Facility: CLINIC | Age: 69
End: 2019-11-01

## 2019-11-01 NOTE — TELEPHONE ENCOUNTER
Bone lesion is being followed by Dr. Henning.     Per Dr. Acevedo a phone call is not needed.     Wendy Felder ATC

## 2020-01-05 ENCOUNTER — MYC MEDICAL ADVICE (OUTPATIENT)
Dept: FAMILY MEDICINE | Facility: CLINIC | Age: 70
End: 2020-01-05

## 2020-01-16 ENCOUNTER — OFFICE VISIT (OUTPATIENT)
Dept: FAMILY MEDICINE | Facility: CLINIC | Age: 70
End: 2020-01-16
Payer: COMMERCIAL

## 2020-01-16 VITALS
RESPIRATION RATE: 12 BRPM | TEMPERATURE: 97.9 F | HEIGHT: 64 IN | SYSTOLIC BLOOD PRESSURE: 138 MMHG | BODY MASS INDEX: 34.56 KG/M2 | HEART RATE: 64 BPM | WEIGHT: 202.4 LBS | DIASTOLIC BLOOD PRESSURE: 76 MMHG

## 2020-01-16 DIAGNOSIS — L98.9 SKIN LESION: Primary | ICD-10-CM

## 2020-01-16 PROCEDURE — 99213 OFFICE O/P EST LOW 20 MIN: CPT | Performed by: FAMILY MEDICINE

## 2020-01-16 ASSESSMENT — MIFFLIN-ST. JEOR: SCORE: 1418.08

## 2020-01-16 NOTE — PROGRESS NOTES
Subjective     Payton Gardiner is a 69 year old female who presents to clinic today for the following health issues:    HPI     * mole on chest    AK ans SK that were treated at last visit have healed nicely.  New skin lesion pt first noted a few weeks ago.  Feels rough.  Not changing since she first noticed it.  No itching/bleeding or pain.      Reviewed and updated as needed this visit by Provider         Review of Systems   ROS COMP: Constitutional, HEENT, cardiovascular, pulmonary, gi and gu systems are negative, except as otherwise noted.      Objective    Wt 91.8 kg (202 lb 6.4 oz)   Breastfeeding No   BMI 34.74 kg/m    Body mass index is 34.74 kg/m .  Physical Exam   PE:  VS as above   Gen:  WN/WD/WH female in NAD   Skin:  6mm partially erythematous, partially pigmented raised circular skin lesion on chest. ? BCC    Diagnostic Test Results:  none         A?P:      ICD-10-CM    1. Skin lesion L98.9 DERMATOLOGY REFERRAL     Due to size recommend dermatology visit for management.  Referral placed.  Pt verbalized understanding.    Araseli Frazier DO

## 2020-01-16 NOTE — NURSING NOTE
"Initial /76   Pulse 64   Temp 97.9  F (36.6  C) (Tympanic)   Resp 12   Ht 1.626 m (5' 4\")   Wt 91.8 kg (202 lb 6.4 oz)   Breastfeeding No   BMI 34.74 kg/m   Estimated body mass index is 34.74 kg/m  as calculated from the following:    Height as of this encounter: 1.626 m (5' 4\").    Weight as of this encounter: 91.8 kg (202 lb 6.4 oz). .      "

## 2020-01-17 ENCOUNTER — TRANSFERRED RECORDS (OUTPATIENT)
Dept: HEALTH INFORMATION MANAGEMENT | Facility: CLINIC | Age: 70
End: 2020-01-17

## 2020-01-24 ENCOUNTER — TRANSFERRED RECORDS (OUTPATIENT)
Dept: HEALTH INFORMATION MANAGEMENT | Facility: CLINIC | Age: 70
End: 2020-01-24

## 2020-03-08 ENCOUNTER — MYC MEDICAL ADVICE (OUTPATIENT)
Dept: FAMILY MEDICINE | Facility: CLINIC | Age: 70
End: 2020-03-08

## 2020-03-09 ENCOUNTER — OFFICE VISIT (OUTPATIENT)
Dept: FAMILY MEDICINE | Facility: CLINIC | Age: 70
End: 2020-03-09
Payer: COMMERCIAL

## 2020-03-09 VITALS
BODY MASS INDEX: 33.55 KG/M2 | SYSTOLIC BLOOD PRESSURE: 124 MMHG | HEIGHT: 64 IN | HEART RATE: 80 BPM | TEMPERATURE: 98.5 F | WEIGHT: 196.5 LBS | DIASTOLIC BLOOD PRESSURE: 74 MMHG | RESPIRATION RATE: 16 BRPM

## 2020-03-09 DIAGNOSIS — J02.9 VIRAL PHARYNGITIS: Primary | ICD-10-CM

## 2020-03-09 LAB
DEPRECATED S PYO AG THROAT QL EIA: NEGATIVE
SPECIMEN SOURCE: NORMAL
SPECIMEN SOURCE: NORMAL
STREP GROUP A PCR: NOT DETECTED

## 2020-03-09 PROCEDURE — 40001204 ZZHCL STATISTIC STREP A RAPID: Performed by: PHYSICIAN ASSISTANT

## 2020-03-09 PROCEDURE — 99213 OFFICE O/P EST LOW 20 MIN: CPT | Performed by: PHYSICIAN ASSISTANT

## 2020-03-09 PROCEDURE — 87651 STREP A DNA AMP PROBE: CPT | Performed by: PHYSICIAN ASSISTANT

## 2020-03-09 RX ORDER — GUAIFENESIN/DEXTROMETHORPHAN 100-10MG/5
10 SYRUP ORAL 4 TIMES DAILY PRN
Qty: 473 ML | Refills: 0 | Status: SHIPPED | OUTPATIENT
Start: 2020-03-09 | End: 2020-09-17

## 2020-03-09 RX ORDER — FLUTICASONE PROPIONATE 50 MCG
1 SPRAY, SUSPENSION (ML) NASAL DAILY
Qty: 18.2 ML | Refills: 0 | Status: SHIPPED | OUTPATIENT
Start: 2020-03-09 | End: 2020-09-17

## 2020-03-09 ASSESSMENT — ENCOUNTER SYMPTOMS
ABDOMINAL PAIN: 0
SHORTNESS OF BREATH: 0
RHINORRHEA: 1
CHILLS: 1
SORE THROAT: 1
NERVOUS/ANXIOUS: 0
FEVER: 1

## 2020-03-09 ASSESSMENT — MIFFLIN-ST. JEOR: SCORE: 1386.32

## 2020-03-09 ASSESSMENT — PAIN SCALES - GENERAL: PAINLEVEL: NO PAIN (0)

## 2020-03-09 NOTE — PATIENT INSTRUCTIONS
Your symptoms are likely due to a virus.  There are no antibiotics to treat viruses.  For treatment you have been prescribed Flonase for nasal congestion as well as Mucinex DM for cough and congestion.  It may take a couple more weeks for your virus to run its course, but you should improve throughout this time.  For fevers, headache, or any other pain you may use Tylenol/ibuprofen.  Return to clinic if your symptoms are worsening, or not improving.  Patient Education     Viral Upper Respiratory Illness (Adult)    You have a viral upper respiratory illness (URI), which is another term for the common cold. This illness is contagious during the first few days. It is spread through the air by coughing and sneezing. It may also be spread by direct contact (touching the sick person and then touching your own eyes, nose, or mouth). Frequent handwashing will decrease risk of spread. Most viral illnesses go away within 7 to 10 days with rest and simple home remedies. Sometimes the illness may last for several weeks. Antibiotics will not kill a virus, and they are generally not prescribed for this condition.  Home care    If symptoms are severe, rest at home for the first 2 to 3 days. When you resume activity, don't let yourself get too tired.    Don't smoke. If you need help stopping, talk with your healthcare provider.    Avoid being exposed to cigarette smoke (yours or others ).    You may use acetaminophen or ibuprofen to control pain and fever, unless another medicine was prescribed. If you have chronic liver or kidney disease, have ever had a stomach ulcer or gastrointestinal bleeding, or are taking blood-thinning medicines, talk with your healthcare provider before using these medicines. Aspirin should never be given to anyone under 18 years of age who is ill with a viral infection or fever. It may cause severe liver or brain damage.    Your appetite may be poor, so a light diet is fine. Stay well hydrated by drinking  6 to 8 glasses of fluids per day (water, soft drinks, juices, tea, or soup). Extra fluids will help loosen secretions in the nose and lungs.    Over-the-counter cold medicines will not shorten the length of time you re sick, but they may be helpful for the following symptoms: cough, sore throat, and nasal and sinus congestion. If you take prescription medicines, ask your healthcare provider or pharmacist which over-the-counter medicines are safe to use. (Note: Don't use decongestants if you have high blood pressure.)  Follow-up care  Follow up with your healthcare provider, or as advised.  When to seek medical advice  Call your healthcare provider right away if any of these occur:    Cough with lots of colored sputum (mucus)    Severe headache; face, neck, or ear pain    Difficulty swallowing due to throat pain    Fever of 100.4 F (38 C) or higher, or as directed by your healthcare provider  Call 911  Call 911 if any of these occur:    Chest pain, shortness of breath, wheezing, or difficulty breathing    Coughing up blood    Very severe pain with swallowing, especially if it goes along with a muffled voice   Date Last Reviewed: 6/1/2018 2000-2019 [employer]. [address] [place] [address], [place] [address]. All rights reserved. This information is not intended as a substitute for professional medical care. Always follow your healthcare professional's instructions.

## 2020-03-09 NOTE — TELEPHONE ENCOUNTER
Call placed to patient.  Day 5 ear pain, sore throat.  Fatigue.  Has not checked her temp,  Has had hot/cold episodes.  Pushing po fluids, has tried hot tea, honey, salt water gargle for throat pain. Ibuprofen has been only thing that helps minimize symptoms.  Denies SOA or chest pain.  Patient was in [place] for 1 month, returned 2/29.  Did have ear pain with flight then improved, returned again 5 days ago.  Scheduled clinic visit today, patient agrees with plan.  Stefany Ordonez RN

## 2020-03-09 NOTE — PROGRESS NOTES
Subjective     Payton Gardiner is a 70 year old female who presents to clinic today for the following health issues:    Patient notes she woke up 5 days ago with sore throat, ear ache bilaterally, nasal congestion, chills, cough, She returned from [place] one week ago. Symptoms treated with Ibuprofen last night. Patient denies sick contacts.   Patient denies history of seasonal allergies.     HPI     Acute Illness   Acute illness concerns: Sore throat and ear pain  Onset: 5 days    Fever: no    Chills/Sweats: YES    Headache (location?): no    Sinus Pressure:YES- tender, post-nasal drainage and facial pain    Conjunctivitis:  no    Ear Pain: YES: bilateral    Rhinorrhea: no    Congestion: YES    Sore Throat: YES     Cough: YES-non-productive    Wheeze: no    Decreased Appetite: YES- Less PO    Nausea: no    Vomiting: no    Diarrhea:  no    Dysuria/Freq.: no    Fatigue/Achiness: YES- fatigue    Sick/Strep Exposure: no     Therapies Tried and outcome: Ibuprofen        Patient Active Problem List   Diagnosis     Cataracts, bilateral     Alopecia areata     Breast cyst, left     Fibrocystic breast changes     Abnormal mammogram     Greater trochanteric bursitis, right     HCD (health care directive)     Hyperlipidemia     Low back pain     Osteoporosis     Patellofemoral syndrome     Postmenopausal     Sprain of wrist     Vitamin D deficiency     Abnormal glucose     BMI 30-40 with comorbid conditions. (IFG)     Essential hypertension with goal blood pressure less than 140/90     Past Surgical History:   Procedure Laterality Date     CATARACT IOL, RT/LT       COLONOSCOPY  08/03/2016    incomplete     COLONOSCOPY  08/04/2016     DEXA - HIM SCAN  05/13/2015     EXCISE BREAST CYST/FIBROADENOMA/TUMOR/DUCT LESION/NIPPLE LESION/AREOLAR LESION Left 05/24/2001    left breast cyst aspiration     HYSTERECTOMY  1998    took ovaries and cervix as well, removed due to cyst (benign)       Social History     Tobacco Use     Smoking  status: Former Smoker     Smokeless tobacco: Never Used     Tobacco comment: couple of cigs per day for about 30 years.   Substance Use Topics     Alcohol use: Yes     Alcohol/week: 0.0 standard drinks     Comment: socially     Family History   Problem Relation Age of Onset     Hypertension Mother      Coronary Artery Disease Mother      Dementia Mother      Parkinsonism Mother      Other - See Comments Mother         lewy body disease     Macular Degeneration Mother      Cataracts Mother      Colon Cancer Father         age 93     Stomach Cancer Father      Breast Cancer Sister      Thyroid Cancer Sister      Brain Tumor Sister      Cataracts Sister      Tumor Sister         non-malignant brain tumor     Pancreatic Cancer Other      Diabetes No family hx of          Current Outpatient Medications   Medication Sig Dispense Refill     Calcium Carb-Cholecalciferol (CALCIUM 600 + D PO) Take by mouth daily        Cholecalciferol (VITAMIN D) 2000 UNITS CAPS Take by mouth daily        FIBER PO Take by mouth daily       fluticasone (FLONASE) 50 MCG/ACT nasal spray Spray 1 spray into both nostrils daily 18.2 mL 0     guaiFENesin-dextromethorphan (ROBITUSSIN DM) 100-10 MG/5ML syrup Take 10 mLs by mouth 4 times daily as needed for cough 473 mL 0     hydrochlorothiazide (HYDRODIURIL) 12.5 MG tablet TAKE 1 TABLET BY MOUTH ONCE DAILY 90 tablet 1     vitamin B complex with vitamin C (VITAMIN  B COMPLEX) TABS tablet Take 1 tablet by mouth daily       Allergies   Allergen Reactions     Hmg-Coa-R Inhibitors      Could hardly walk with STATINS     Adhesive Tape Rash     Macrodantin [Nitrofurantoin] Rash     Nickel Rash     Sulfa Drugs Rash         Reviewed and updated as needed this visit by Provider  Tobacco  Allergies  Meds  Problems  Med Hx  Surg Hx  Fam Hx         Review of Systems   Constitutional: Positive for chills and fever.   HENT: Positive for congestion, ear pain, rhinorrhea and sore throat.    Respiratory:  "Negative for shortness of breath.    Cardiovascular: Negative for chest pain.   Gastrointestinal: Negative for abdominal pain.   Skin: Negative for rash.   Psychiatric/Behavioral: The patient is not nervous/anxious.             Objective    /74   Pulse 80   Temp 98.5  F (36.9  C) (Tympanic)   Resp 16   Ht 1.626 m (5' 4\")   Wt 89.1 kg (196 lb 8 oz)   BMI 33.73 kg/m    Body mass index is 33.73 kg/m .  Physical Exam  Vitals signs and nursing note reviewed.   Constitutional:       General: She is not in acute distress.     Appearance: Normal appearance. She is not ill-appearing or toxic-appearing.   HENT:      Head: Normocephalic and atraumatic.      Right Ear: Tympanic membrane and ear canal normal.      Left Ear: Tympanic membrane and ear canal normal.      Nose: Congestion and rhinorrhea present.      Mouth/Throat:      Mouth: Mucous membranes are moist.      Pharynx: Oropharynx is clear. Posterior oropharyngeal erythema (mild ) present. No oropharyngeal exudate.      Comments: No swelling   Eyes:      Extraocular Movements: Extraocular movements intact.      Pupils: Pupils are equal, round, and reactive to light.   Neck:      Musculoskeletal: Normal range of motion.   Cardiovascular:      Rate and Rhythm: Normal rate and regular rhythm.      Heart sounds: Normal heart sounds.   Pulmonary:      Effort: Pulmonary effort is normal.      Breath sounds: Normal breath sounds. No wheezing, rhonchi or rales.   Musculoskeletal: Normal range of motion.   Skin:     General: Skin is warm and dry.   Neurological:      General: No focal deficit present.      Mental Status: She is alert.   Psychiatric:         Mood and Affect: Mood normal.         Behavior: Behavior normal.            Diagnostic Test Results:  Labs reviewed in Epic  none         Assessment & Plan     1. Viral pharyngitis  Rapid strep negative.  Symptoms are likely due to viral pharyngitis.  Discussed OTC options and symptomatic treatment including " Robitussin-DM and Flonase.  Discussed expected course and return precautions.    - Streptococcus A Rapid Scr w Reflx to PCR  - Group A Streptococcus PCR Throat Swab  - fluticasone (FLONASE) 50 MCG/ACT nasal spray; Spray 1 spray into both nostrils daily  Dispense: 18.2 mL; Refill: 0  - guaiFENesin-dextromethorphan (ROBITUSSIN DM) 100-10 MG/5ML syrup; Take 10 mLs by mouth 4 times daily as needed for cough  Dispense: 473 mL; Refill: 0       See Patient Instructions    Return in about 2 weeks (around 3/23/2020), or if symptoms worsen or fail to improve.    Dedra Handley PA-C  [facility]

## 2020-03-27 DIAGNOSIS — I10 ESSENTIAL HYPERTENSION WITH GOAL BLOOD PRESSURE LESS THAN 140/90: ICD-10-CM

## 2020-03-30 RX ORDER — HYDROCHLOROTHIAZIDE 12.5 MG/1
TABLET ORAL
Qty: 30 TABLET | Refills: 1 | Status: SHIPPED | OUTPATIENT
Start: 2020-03-30 | End: 2020-06-05

## 2020-03-30 NOTE — TELEPHONE ENCOUNTER
Prescription approved per [facility] Refill Protocol.    Janine SAHU RN

## 2020-03-30 NOTE — TELEPHONE ENCOUNTER
"Requested Prescriptions   Pending Prescriptions Disp Refills     hydrochlorothiazide (HYDRODIURIL) 12.5 MG tablet [Pharmacy Med Name: hydroCHLOROthiazide 12.5 MG Oral Tablet] 90 tablet 0     Sig: Take 1 tablet by mouth once daily  Last Written Prescription Date:  10/15/2019 #90 x 1  Last filled 06/02/2019  Last office visit: 3/9/2020 [facility] Emmanuelle   Future Office Visit:  None         Diuretics (Including Combos) Protocol Passed - 3/27/2020  6:59 PM        Passed - Blood pressure under 140/90 in past 12 months     BP Readings from Last 3 Encounters:   03/09/20 124/74   01/16/20 138/76   10/11/19 132/80                 Passed - Recent (12 mo) or future (30 days) visit within the authorizing provider's specialty     Patient has had an office visit with the authorizing provider or a provider within the authorizing providers department within the previous 12 mos or has a future within next 30 days. See \"Patient Info\" tab in inbasket, or \"Choose Columns\" in Meds & Orders section of the refill encounter.              Passed - Medication is active on med list        Passed - Patient is age 18 or older        Passed - No active pregancy on record        Passed - Normal serum creatinine on file in past 12 months     Recent Labs   Lab Test 05/23/19  1051   CR 0.77              Passed - Normal serum potassium on file in past 12 months     Recent Labs   Lab Test 05/23/19  1051   POTASSIUM 3.7                    Passed - Normal serum sodium on file in past 12 months     Recent Labs   Lab Test 05/23/19  1051                 Passed - No positive pregnancy test in past 12 months             "

## 2020-06-05 ENCOUNTER — MYC MEDICAL ADVICE (OUTPATIENT)
Dept: FAMILY MEDICINE | Facility: CLINIC | Age: 70
End: 2020-06-05

## 2020-06-05 DIAGNOSIS — I10 ESSENTIAL HYPERTENSION WITH GOAL BLOOD PRESSURE LESS THAN 140/90: ICD-10-CM

## 2020-06-05 RX ORDER — HYDROCHLOROTHIAZIDE 12.5 MG/1
12.5 TABLET ORAL DAILY
Qty: 90 TABLET | Refills: 0 | Status: SHIPPED | OUTPATIENT
Start: 2020-06-05 | End: 2020-09-01

## 2020-06-05 NOTE — TELEPHONE ENCOUNTER
"Routing refill request to provider for review/approval because:  Labs not current:    Patient needs to be seen because it has been more than 1 year since last office visit.    Patient requesting a 90 day supply  La Vance RN            Requested Prescriptions   Pending Prescriptions Disp Refills     hydrochlorothiazide (HYDRODIURIL) 12.5 MG tablet 30 tablet 1     Sig: Take 1 tablet (12.5 mg) by mouth daily       Diuretics (Including Combos) Protocol Failed - 6/5/2020  7:55 AM        Failed - Normal serum creatinine on file in past 12 months     Recent Labs   Lab Test 05/23/19  1051   CR 0.77              Failed - Normal serum potassium on file in past 12 months     Recent Labs   Lab Test 05/23/19  1051   POTASSIUM 3.7                    Failed - Normal serum sodium on file in past 12 months     Recent Labs   Lab Test 05/23/19  1051                 Passed - Blood pressure under 140/90 in past 12 months     BP Readings from Last 3 Encounters:   03/09/20 124/74   01/16/20 138/76   10/11/19 132/80                 Passed - Recent (12 mo) or future (30 days) visit within the authorizing provider's specialty     Patient has had an office visit with the authorizing provider or a provider within the authorizing providers department within the previous 12 mos or has a future within next 30 days. See \"Patient Info\" tab in inbasket, or \"Choose Columns\" in Meds & Orders section of the refill encounter.              Passed - Medication is active on med list        Passed - Patient is age 18 or older        Passed - No active pregancy on record        Passed - No positive pregnancy test in past 12 months           "

## 2020-08-31 DIAGNOSIS — I10 ESSENTIAL HYPERTENSION WITH GOAL BLOOD PRESSURE LESS THAN 140/90: ICD-10-CM

## 2020-09-01 RX ORDER — HYDROCHLOROTHIAZIDE 12.5 MG/1
TABLET ORAL
Qty: 90 TABLET | Refills: 0 | Status: SHIPPED | OUTPATIENT
Start: 2020-09-01 | End: 2020-09-17

## 2020-09-01 NOTE — TELEPHONE ENCOUNTER
Routing refill request to provider for review/approval because:  Labs not current:    Stefany Ordonez RN

## 2020-09-02 NOTE — TELEPHONE ENCOUNTER
Please call pt.  Due for annual wellness visit and fasting labs.  Please help her to schedule    Araseli Frazier DO

## 2020-09-17 ENCOUNTER — OFFICE VISIT (OUTPATIENT)
Dept: FAMILY MEDICINE | Facility: CLINIC | Age: 70
End: 2020-09-17
Payer: COMMERCIAL

## 2020-09-17 ENCOUNTER — ANCILLARY PROCEDURE (OUTPATIENT)
Dept: GENERAL RADIOLOGY | Facility: CLINIC | Age: 70
End: 2020-09-17
Attending: FAMILY MEDICINE
Payer: COMMERCIAL

## 2020-09-17 VITALS
BODY MASS INDEX: 34.48 KG/M2 | RESPIRATION RATE: 16 BRPM | TEMPERATURE: 97.6 F | WEIGHT: 200.9 LBS | SYSTOLIC BLOOD PRESSURE: 138 MMHG | HEART RATE: 61 BPM | DIASTOLIC BLOOD PRESSURE: 70 MMHG

## 2020-09-17 DIAGNOSIS — M89.9 BONE LESION: ICD-10-CM

## 2020-09-17 DIAGNOSIS — Z11.59 SCREENING FOR VIRAL DISEASE: ICD-10-CM

## 2020-09-17 DIAGNOSIS — I10 ESSENTIAL HYPERTENSION WITH GOAL BLOOD PRESSURE LESS THAN 140/90: ICD-10-CM

## 2020-09-17 DIAGNOSIS — E78.5 HYPERLIPIDEMIA, UNSPECIFIED HYPERLIPIDEMIA TYPE: ICD-10-CM

## 2020-09-17 DIAGNOSIS — R73.09 ABNORMAL GLUCOSE: ICD-10-CM

## 2020-09-17 DIAGNOSIS — Z00.00 ENCOUNTER FOR MEDICARE ANNUAL WELLNESS EXAM: Primary | ICD-10-CM

## 2020-09-17 LAB
ALBUMIN SERPL-MCNC: 3.6 G/DL (ref 3.4–5)
ALP SERPL-CCNC: 52 U/L (ref 40–150)
ALT SERPL W P-5'-P-CCNC: 22 U/L (ref 0–50)
ANION GAP SERPL CALCULATED.3IONS-SCNC: 5 MMOL/L (ref 3–14)
AST SERPL W P-5'-P-CCNC: 16 U/L (ref 0–45)
BILIRUB SERPL-MCNC: 0.6 MG/DL (ref 0.2–1.3)
BUN SERPL-MCNC: 25 MG/DL (ref 7–30)
CALCIUM SERPL-MCNC: 9.3 MG/DL (ref 8.5–10.1)
CHLORIDE SERPL-SCNC: 105 MMOL/L (ref 94–109)
CHOLEST SERPL-MCNC: 227 MG/DL
CO2 SERPL-SCNC: 29 MMOL/L (ref 20–32)
CREAT SERPL-MCNC: 0.84 MG/DL (ref 0.52–1.04)
GFR SERPL CREATININE-BSD FRML MDRD: 69 ML/MIN/{1.73_M2}
GLUCOSE SERPL-MCNC: 93 MG/DL (ref 70–99)
HBA1C MFR BLD: 5.3 % (ref 0–5.6)
HDLC SERPL-MCNC: 82 MG/DL
LDLC SERPL CALC-MCNC: 126 MG/DL
NONHDLC SERPL-MCNC: 145 MG/DL
POTASSIUM SERPL-SCNC: 3.8 MMOL/L (ref 3.4–5.3)
PROT SERPL-MCNC: 7.7 G/DL (ref 6.8–8.8)
SODIUM SERPL-SCNC: 139 MMOL/L (ref 133–144)
TRIGL SERPL-MCNC: 94 MG/DL

## 2020-09-17 PROCEDURE — 36415 COLL VENOUS BLD VENIPUNCTURE: CPT | Performed by: FAMILY MEDICINE

## 2020-09-17 PROCEDURE — 80061 LIPID PANEL: CPT | Performed by: FAMILY MEDICINE

## 2020-09-17 PROCEDURE — G0008 ADMIN INFLUENZA VIRUS VAC: HCPCS | Performed by: FAMILY MEDICINE

## 2020-09-17 PROCEDURE — 73560 X-RAY EXAM OF KNEE 1 OR 2: CPT | Mod: RT

## 2020-09-17 PROCEDURE — 90662 IIV NO PRSV INCREASED AG IM: CPT | Performed by: FAMILY MEDICINE

## 2020-09-17 PROCEDURE — 86769 SARS-COV-2 COVID-19 ANTIBODY: CPT | Performed by: FAMILY MEDICINE

## 2020-09-17 PROCEDURE — 99397 PER PM REEVAL EST PAT 65+ YR: CPT | Mod: 25 | Performed by: FAMILY MEDICINE

## 2020-09-17 PROCEDURE — 80053 COMPREHEN METABOLIC PANEL: CPT | Performed by: FAMILY MEDICINE

## 2020-09-17 PROCEDURE — 83036 HEMOGLOBIN GLYCOSYLATED A1C: CPT | Performed by: FAMILY MEDICINE

## 2020-09-17 RX ORDER — HYDROCHLOROTHIAZIDE 12.5 MG/1
12.5 TABLET ORAL DAILY
Qty: 90 TABLET | Refills: 3 | Status: SHIPPED | OUTPATIENT
Start: 2020-09-17 | End: 2021-10-14

## 2020-09-17 ASSESSMENT — PAIN SCALES - GENERAL: PAINLEVEL: NO PAIN (0)

## 2020-09-17 NOTE — PROGRESS NOTES
"  SUBJECTIVE:   Payton Gardiner is a 70 year old female who presents for Preventive Visit.      Patient has been advised of split billing requirements and indicates understanding: Yes     Are you in the first 12 months of your Medicare Part B coverage?  No    Physical Health:    In general, how would you rate your overall physical health? good    Outside of work, how many days during the week do you exercise? 2-3 days/week    Outside of work, approximately how many minutes a day do you exercise?30-45 minutes    If you drink alcohol do you typically have >3 drinks per day or >7 drinks per week? No    Do you usually eat at least 4 servings of fruit and vegetables a day, include whole grains & fiber and avoid regularly eating high fat or \"junk\" foods? NO    Do you have any problems taking medications regularly?  No    Do you have any side effects from medications? none    Needs assistance for the following daily activities: no assistance needed    Which of the following safety concerns are present in your home?  none identified     Hearing impairment: Yes, she wears hearing aids    In the past 6 months, have you been bothered by leaking of urine? no    Mental Health:    In general, how would you rate your overall mental or emotional health? good  PHQ-2 Score:  0    Do you feel safe in your environment? Yes    Have you ever done Advance Care Planning? (For example, a Health Directive, POLST, or a discussion with a medical provider or your loved ones about your wishes): Yes, advance care planning is on file.    Additional concerns to address?  YES, She is wanting to discuss the situation with her right knee. She is also wanting to let you know that she was sick the entire month of march and she says she has not been that sick in 50 years. Her daughter is thinking that she might of had covid. She is just wanting your thought.     Reviewed note from Dr Roger, recommend repeat x-ray 1 year to follow up endochondroma    Fall " risk:  Fallen 2 or more times in the past year?: No  Any fall with injury in the past year?: No    Cognitive Screenin) Repeat 3 items (Leader, Season, Table)    2) Clock draw: NORMAL  3) 3 item recall: Recalls 2 objects   Results: NORMAL clock, 1-2 items recalled: COGNITIVE IMPAIRMENT LESS LIKELY    Mini-CogTM Copyright RY Briceno. Licensed by the author for use in [facility]; reprinted with permission (beronica@[facility]). All rights reserved.      Do you have sleep apnea, excessive snoring or daytime drowsiness?: no        Reviewed and updated as needed this visit by clinical staff  Tobacco  Allergies  Meds  Med Hx  Surg Hx  Fam Hx  Soc Hx        Reviewed and updated as needed this visit by Provider  Tobacco  Allergies  Meds  Med Hx  Surg Hx  Fam Hx  Soc Hx       Social History     Tobacco Use     Smoking status: Former Smoker     Smokeless tobacco: Never Used     Tobacco comment: couple of cigs per day for about 30 years.   Substance Use Topics     Alcohol use: Yes     Alcohol/week: 0.0 standard drinks     Comment: socially                           Current providers sharing in care for this patient include:   Patient Care Team:  Araseli Frazier DO as PCP - General (Family Practice)  Araseli Frazier DO as Assigned PCP  Luiz Acevedo DO as Referring Physician (Family Medicine - Sports Medicine)  Ilir Henning MD as MD (Orthopaedic Surgery)  Shanell Cardenas RN as Specialty Care Coordinator (Orthopedics)    The following health maintenance items are reviewed in Epic and correct as of today:  Health Maintenance   Topic Date Due     ZOSTER IMMUNIZATION (2 of 3) 2010     FALL RISK ASSESSMENT  2020     MAMMO SCREENING  2020     INFLUENZA VACCINE (1) 2020     DTAP/TDAP/TD IMMUNIZATION (2 - Td) 01/10/2021     COLORECTAL CANCER SCREENING  2021     MEDICARE ANNUAL WELLNESS VISIT  2021     LIPID  2024     ADVANCE CARE PLANNING   "09/17/2025     DEXA  Completed     HEPATITIS C SCREENING  Completed     PHQ-2  Completed     PNEUMOCOCCAL IMMUNIZATION 65+ LOW/MEDIUM RISK  Completed     IPV IMMUNIZATION  Aged Out     MENINGITIS IMMUNIZATION  Aged Out     HEPATITIS B IMMUNIZATION  Aged Out       ROS:  Constitutional, HEENT, cardiovascular, pulmonary, gi and gu systems are negative, except as otherwise noted.    OBJECTIVE:   /70   Pulse 61   Temp 97.6  F (36.4  C) (Tympanic)   Resp 16   Wt 91.1 kg (200 lb 14.4 oz)   BMI 34.48 kg/m   Estimated body mass index is 34.48 kg/m  as calculated from the following:    Height as of 3/9/20: 1.626 m (5' 4\").    Weight as of this encounter: 91.1 kg (200 lb 14.4 oz).  EXAM:   GENERAL APPEARANCE: healthy, alert and no distress  EYES: Eyes grossly normal to inspection, PERRL and conjunctivae and sclerae normal  HENT: ear canals and TM's normal  NECK: no adenopathy, no asymmetry, masses, or scars and thyroid normal to palpation  RESP: lungs clear to auscultation - no rales, rhonchi or wheezes  BREAST: normal without masses, tenderness or nipple discharge and no palpable axillary masses or adenopathy  CV: regular rate and rhythm, normal S1 S2, no S3 or S4, no murmur, click or rub, no peripheral edema and peripheral pulses strong  ABDOMEN: soft, nontender, no hepatosplenomegaly, no masses and bowel sounds normal  MS: no musculoskeletal defects are noted and gait is age appropriate without ataxia  NEURO: Normal strength and tone, sensory exam grossly normal, mentation intact and speech normal  PSYCH: mentation appears normal and affect normal/bright    Diagnostic Test Results:  Labs reviewed in Epic    ASSESSMENT / PLAN:       ICD-10-CM    1. Encounter for Medicare annual wellness exam  Z00.00    2. Hyperlipidemia, unspecified hyperlipidemia type  E78.5 Comprehensive metabolic panel (BMP + Alb, Alk Phos, ALT, AST, Total. Bili, TP)     Lipid panel reflex to direct LDL Fasting   3. Essential hypertension with " "goal blood pressure less than 140/90  I10 Comprehensive metabolic panel (BMP + Alb, Alk Phos, ALT, AST, Total. Bili, TP)     hydrochlorothiazide (HYDRODIURIL) 12.5 MG tablet   4. Abnormal glucose  R73.09 Comprehensive metabolic panel (BMP + Alb, Alk Phos, ALT, AST, Total. Bili, TP)     Hemoglobin A1c   5. Bone lesion  M89.9 XR Knee Right 1/2 Views   6. Screening for viral disease  Z11.59 COVID-19 Virus (Coronavirus) Antibody & Titer Reflex         COUNSELING:  Reviewed preventive health counseling, as reflected in patient instructions    Estimated body mass index is 34.48 kg/m  as calculated from the following:    Height as of 3/9/20: 1.626 m (5' 4\").    Weight as of this encounter: 91.1 kg (200 lb 14.4 oz).    Weight management plan: Discussed healthy diet and exercise guidelines    She reports that she has quit smoking. She has never used smokeless tobacco.    Appropriate preventive services were discussed with this patient, including applicable screening as appropriate for cardiovascular disease, diabetes, osteopenia/osteoporosis, and glaucoma.  As appropriate for age/gender, discussed screening for colorectal cancer, prostate cancer, breast cancer, and cervical cancer. Checklist reviewing preventive services available has been given to the patient.    Reviewed patients plan of care and provided an AVS. The Intermediate Care Plan ( asthma action plan, low back pain action plan, and migraine action plan) for Payton meets the Care Plan requirement. This Care Plan has been established and reviewed with the Patient.    Counseling Resources:  ATP IV Guidelines  Pooled Cohorts Equation Calculator  Breast Cancer Risk Calculator  BRCA-Related Cancer Risk Assessment: FHS-7 Tool  FRAX Risk Assessment  ICSI Preventive Guidelines  Dietary Guidelines for Americans, 2010  USDA's MyPlate  ASA Prophylaxis  Lung CA Screening    Araseli Frazier, DO  [facility]"

## 2020-09-17 NOTE — PATIENT INSTRUCTIONS
I will let you know the blood test and x-ray results when available.    Your colonoscopy next year should be your last.

## 2020-09-18 LAB
COVID-19 SPIKE RBD ABY TITER: NORMAL
COVID-19 SPIKE RBD ABY: NEGATIVE

## 2020-09-20 ENCOUNTER — TELEPHONE (OUTPATIENT)
Dept: FAMILY MEDICINE | Facility: CLINIC | Age: 70
End: 2020-09-20

## 2020-09-20 NOTE — TELEPHONE ENCOUNTER
I would like to speak to Dr Henning (orthopedics at the  I believe) regarding this mutual patient.  I saw her in clinic on 9/17 and would like to review her knee x-ray with him.  Please have him call on the [facility] line    thanks    Araseli Frazier DO

## 2020-09-21 NOTE — TELEPHONE ENCOUNTER
Dr lackey.,      I called the unit(s) and have spoke to someone who is going to get dr liz your message to call you over at [place]. I also let ciara the  know that he would be calling.     Pati [place], [place]

## 2020-09-22 NOTE — TELEPHONE ENCOUNTER
I have not yet heard from Dr Henning.  Can we try getting a hold of him again?    Araseli Frazier, DO

## 2020-09-25 NOTE — TELEPHONE ENCOUNTER
Spoke with Jadiel from the [facility]. He will send out a one time courtesy page to Dr. Rogre and have him call the doctor's line in [place]. Direct line given. Routed to Dr. Frazier to update.  Maine Sanford RN

## 2020-09-25 NOTE — TELEPHONE ENCOUNTER
Please call ortho at [facility].  I have been waiting to hear back from Dr Godoy.   I am out of clinic next week and would like to get this cleared up for pt prior to the weekend.      She had an x-ray in clinic to monitor a known bone lesion.  The lesion is larger and I would like to discuss next steps with Dr Godoy.    Araseli Frazier, DO

## 2020-09-28 ENCOUNTER — TELEPHONE (OUTPATIENT)
Dept: FAMILY MEDICINE | Facility: CLINIC | Age: 70
End: 2020-09-28

## 2020-09-28 NOTE — TELEPHONE ENCOUNTER
Called and spoke with Payton regarding x-ray results.  Lesion previously noted and followed by ortho is larger on this evaluation.  Let her know we have been unable to reach Dr Henning to discuss recommendations moving forward.    She is currently out of town for 2 more days, will plan to reach out to clinic upon her return.  In the meantime we will continue to try to reach Dr Henning regarding a plan.    Araseli Frazier, DO

## 2020-09-28 NOTE — TELEPHONE ENCOUNTER
I got ahold of Dr Jarvis and gave him dr lackey phone number to call and the name of the pt, advised he have chart in front of him when he calls dr lackey. He was in surgery when he got the page so not sure when he plans on calling you.     Pati Soto, [place]

## 2020-09-28 NOTE — TELEPHONE ENCOUNTER
Please try again to reach Dr Henning regarding this mutual patient.  He is with [facility] orthopedics at the [place].  We tried multiple times last week to get a hold of him.    I would like him to review this mutual patient's films from her visit with me 9/17 and let me know if additional eval or an appointment with him is needed in follow up.    He can reach me on my cell this week as I am out of clinic [phone].  Please be sure he has pt info to review as I will not be in clinic to provide it when he calls.    Araseli Frazier, DO

## 2020-09-29 NOTE — TELEPHONE ENCOUNTER
Call placed to patient.  Relayed message per Dr Frazier.  Patient verbalized understanding.  Patient will contact Dr Henning to review if additional follow up is needed.  Patient asked this writer to send a copy of Dr Frazier's message to her via [employer] AND letter to her home.  Mailed copy to verified address.  Completed as requested.  Stefany Ordonez RN

## 2020-09-29 NOTE — TELEPHONE ENCOUNTER
"Please call pt.  I did get a message from Dr Henning yesterday afternoon.  He did not feel there was significant change in the size of the lesion from the initial x-ray and the x-ray done most recently.  He felt the \"growth\" reported was due to the difference in imaging technique.  He viewed the films himself and did not feel there was a change.    He did say he would be happy to discuss with the pt and I would recommend that she give his office a call when she gets back in town to discuss specifically with him what he would recommend for additional follow up.  It sounds like he considers this stable and he did not recommend additional follow up to me in his message.    Araseli Frazier, DO    "

## 2020-10-02 ENCOUNTER — TELEPHONE (OUTPATIENT)
Dept: ORTHOPEDICS | Facility: CLINIC | Age: 70
End: 2020-10-02

## 2020-10-02 ENCOUNTER — VIRTUAL VISIT (OUTPATIENT)
Dept: ORTHOPEDICS | Facility: CLINIC | Age: 70
End: 2020-10-02
Payer: COMMERCIAL

## 2020-10-02 DIAGNOSIS — D16.9 ENCHONDROMA: Primary | ICD-10-CM

## 2020-10-02 PROCEDURE — 99213 OFFICE O/P EST LOW 20 MIN: CPT | Mod: 95 | Performed by: ORTHOPAEDIC SURGERY

## 2020-10-02 NOTE — TELEPHONE ENCOUNTER
I called the patient after reviewing with Dr. Chandrika Reece. Patient will have a virtual visit with Dr. Henning this afternoon to review images.     Nicole Little ATC

## 2020-10-02 NOTE — PROGRESS NOTES
"Payton Gardiner is a 70 year old female who is being evaluated via a billable video visit.      The patient has been notified of following:     \"This video visit will be conducted via a call between you and your physician/provider. We have found that certain health care needs can be provided without the need for an in-person physical exam.  This service lets us provide the care you need with a video conversation.  If a prescription is necessary we can send it directly to your pharmacy.  If lab work is needed we can place an order for that and you can then stop by our lab to have the test done at a later time.    Video visits are billed at different rates depending on your insurance coverage.  Please reach out to your insurance provider with any questions.    If during the course of the call the physician/provider feels a video visit is not appropriate, you will not be charged for this service.\"    Patient has given verbal consent for Video visit? Yes  How would you like to obtain your AVS? [employer]        Video Visit Technology for this patient: Jennifer Video Visit- Patient was left in waiting room        Will anyone else be joining your video visit? No        Video-Visit Details    Type of service:  Video Visit    Video Start Time: 3:01 PM  Video End Time: 3:13 PM    Originating Location (pt. Location): Home    Distant Location (provider location):  [facility]     Platform used for Video Visit: Jennifer     This patient has been feeling well overall and got an x-ray to check up on her low-grade cartilage lesion in her right medial condyle.  There was then x-ray reported that it had grown in size and she was concerned about that.  She is not had any new symptoms of pain swelling or knee dysfunction.    Over video visit she was alert oriented and in no acute distress.  Respirations were regular nonlabored eyes appeared nonicteric.  She has made good questions and was there with her " daughter.    I reviewed her MRI from last year x-rays in this year and her x-rays from 2 years ago.  Her recent report compares the MRI to an x-ray.  The x-rays always have some magnification based on the position of the leg compared to the plate.  This seems to have not been taken into account and the patient was described as having an increase in size of the lesion.  I think the lesion is essentially the same as it was 2 years ago based on looking at that x-ray and the fine detail of the calcifications within the lesion.  The natural history of these calcified cartilage lesions is that the calcifications will increase in prominence and so slight increase in size of the lesion would not be cause for concern.  My final impression that the lesion has not grown in size or changed in character and there is no sign of malignant transformation and no new symptoms to pursue.    This patient will consider getting another x-ray in 1 year and get one at any time if she has a new pain in the right knee.  I have answered all of her questions.    Ilir Henning MD

## 2020-10-02 NOTE — TELEPHONE ENCOUNTER
Health Call Center    Phone Message    May a detailed message be left on voicemail: yes     Reason for Call: Other: Pt calling in wondering if Dr. Henning wants to see the pt for f/u and with xrays?  Pt said she recently had an xray done and pt's PCP spoke with Dr. Henning and pt is just not sure what she should be doing.  Please call pt back about this     Action Taken: Message routed to:  [facility] ([facility] Ortho    Travel Screening: Not Applicable

## 2020-10-02 NOTE — LETTER
"    10/2/2020         RE: Payton Gardiner  [address] [place] [address]        Dear Colleague,    Thank you for referring your patient, Payton Gardiner, to the [facility]. Please see a copy of my visit note below.    Payton Gardiner is a 70 year old female who is being evaluated via a billable video visit.      The patient has been notified of following:     \"This video visit will be conducted via a call between you and your physician/provider. We have found that certain health care needs can be provided without the need for an in-person physical exam.  This service lets us provide the care you need with a video conversation.  If a prescription is necessary we can send it directly to your pharmacy.  If lab work is needed we can place an order for that and you can then stop by our lab to have the test done at a later time.    Video visits are billed at different rates depending on your insurance coverage.  Please reach out to your insurance provider with any questions.    If during the course of the call the physician/provider feels a video visit is not appropriate, you will not be charged for this service.\"    Patient has given verbal consent for Video visit? Yes  How would you like to obtain your AVS? [employer]        Video Visit Technology for this patient: Jennifer Video Visit- Patient was left in waiting room        Will anyone else be joining your video visit? No        Video-Visit Details    Type of service:  Video Visit    Video Start Time: 3:01 PM  Video End Time: 3:13 PM    Originating Location (pt. Location): Home    Distant Location (provider location):  [facility]     Platform used for Video Visit: Jennifer     This patient has been feeling well overall and got an x-ray to check up on her low-grade cartilage lesion in her right medial condyle.  There was then x-ray reported that it had grown in size and she was concerned about that.  She " is not had any new symptoms of pain swelling or knee dysfunction.    Over video visit she was alert oriented and in no acute distress.  Respirations were regular nonlabored eyes appeared nonicteric.  She has made good questions and was there with her daughter.    I reviewed her MRI from last year x-rays in this year and her x-rays from 2 years ago.  Her recent report compares the MRI to an x-ray.  The x-rays always have some magnification based on the position of the leg compared to the plate.  This seems to have not been taken into account and the patient was described as having an increase in size of the lesion.  I think the lesion is essentially the same as it was 2 years ago based on looking at that x-ray and the fine detail of the calcifications within the lesion.  The natural history of these calcified cartilage lesions is that the calcifications will increase in prominence and so slight increase in size of the lesion would not be cause for concern.  My final impression that the lesion has not grown in size or changed in character and there is no sign of malignant transformation and no new symptoms to pursue.    This patient will consider getting another x-ray in 1 year and get one at any time if she has a new pain in the right knee.  I have answered all of her questions.    Ilir Henning MD

## 2020-10-02 NOTE — NURSING NOTE
Reason For Visit:   Chief Complaint   Patient presents with     RECHECK     followup right knee // review right knee XR       There were no vitals taken for this visit.    Pain Assessment  Patient Currently in Pain: No(no pain per pt)    Merced Cabrera ATC

## 2020-12-27 ENCOUNTER — HEALTH MAINTENANCE LETTER (OUTPATIENT)
Age: 70
End: 2020-12-27

## 2020-12-31 ENCOUNTER — TRANSFERRED RECORDS (OUTPATIENT)
Dept: HEALTH INFORMATION MANAGEMENT | Facility: CLINIC | Age: 70
End: 2020-12-31

## 2021-01-28 ENCOUNTER — TRANSFERRED RECORDS (OUTPATIENT)
Dept: HEALTH INFORMATION MANAGEMENT | Facility: CLINIC | Age: 71
End: 2021-01-28

## 2021-02-17 ENCOUNTER — MYC MEDICAL ADVICE (OUTPATIENT)
Dept: FAMILY MEDICINE | Facility: CLINIC | Age: 71
End: 2021-02-17

## 2021-03-02 ENCOUNTER — IMMUNIZATION (OUTPATIENT)
Dept: FAMILY MEDICINE | Facility: CLINIC | Age: 71
End: 2021-03-02
Payer: COMMERCIAL

## 2021-03-02 PROCEDURE — 0001A PR COVID VAC PFIZER DIL RECON 30 MCG/0.3 ML IM: CPT

## 2021-03-02 PROCEDURE — 91300 PR COVID VAC PFIZER DIL RECON 30 MCG/0.3 ML IM: CPT

## 2021-03-23 ENCOUNTER — IMMUNIZATION (OUTPATIENT)
Dept: FAMILY MEDICINE | Facility: CLINIC | Age: 71
End: 2021-03-23
Attending: FAMILY MEDICINE
Payer: COMMERCIAL

## 2021-03-23 PROCEDURE — 0002A PR COVID VAC PFIZER DIL RECON 30 MCG/0.3 ML IM: CPT

## 2021-03-23 PROCEDURE — 91300 PR COVID VAC PFIZER DIL RECON 30 MCG/0.3 ML IM: CPT

## 2021-06-01 ENCOUNTER — MYC MEDICAL ADVICE (OUTPATIENT)
Dept: FAMILY MEDICINE | Facility: CLINIC | Age: 71
End: 2021-06-01

## 2021-10-09 ENCOUNTER — HEALTH MAINTENANCE LETTER (OUTPATIENT)
Age: 71
End: 2021-10-09

## 2021-10-10 ASSESSMENT — ENCOUNTER SYMPTOMS
EYE PAIN: 0
JOINT SWELLING: 0
DIZZINESS: 0
ABDOMINAL PAIN: 0
MYALGIAS: 0
HEMATURIA: 0
SHORTNESS OF BREATH: 0
HEARTBURN: 0
DIARRHEA: 0
HEADACHES: 0
PARESTHESIAS: 0
COUGH: 0
DYSURIA: 0
ARTHRALGIAS: 1
NERVOUS/ANXIOUS: 0
FEVER: 0
HEMATOCHEZIA: 0
FREQUENCY: 0
CONSTIPATION: 0
BREAST MASS: 0
SORE THROAT: 0
PALPITATIONS: 0
WEAKNESS: 0
CHILLS: 0
NAUSEA: 0

## 2021-10-10 ASSESSMENT — ACTIVITIES OF DAILY LIVING (ADL): CURRENT_FUNCTION: NO ASSISTANCE NEEDED

## 2021-10-14 ENCOUNTER — OFFICE VISIT (OUTPATIENT)
Dept: FAMILY MEDICINE | Facility: CLINIC | Age: 71
End: 2021-10-14
Payer: COMMERCIAL

## 2021-10-14 ENCOUNTER — ANCILLARY PROCEDURE (OUTPATIENT)
Dept: GENERAL RADIOLOGY | Facility: CLINIC | Age: 71
End: 2021-10-14
Attending: FAMILY MEDICINE
Payer: COMMERCIAL

## 2021-10-14 VITALS
WEIGHT: 199.8 LBS | SYSTOLIC BLOOD PRESSURE: 132 MMHG | TEMPERATURE: 97.4 F | HEIGHT: 63 IN | DIASTOLIC BLOOD PRESSURE: 74 MMHG | HEART RATE: 71 BPM | OXYGEN SATURATION: 98 % | BODY MASS INDEX: 35.4 KG/M2

## 2021-10-14 DIAGNOSIS — E78.5 HYPERLIPIDEMIA, UNSPECIFIED HYPERLIPIDEMIA TYPE: ICD-10-CM

## 2021-10-14 DIAGNOSIS — Z23 HIGH PRIORITY FOR 2019-NCOV VACCINE: ICD-10-CM

## 2021-10-14 DIAGNOSIS — I10 ESSENTIAL HYPERTENSION WITH GOAL BLOOD PRESSURE LESS THAN 140/90: ICD-10-CM

## 2021-10-14 DIAGNOSIS — Z00.00 ENCOUNTER FOR MEDICARE ANNUAL WELLNESS EXAM: Primary | ICD-10-CM

## 2021-10-14 DIAGNOSIS — M89.9 BONE LESION: ICD-10-CM

## 2021-10-14 DIAGNOSIS — R73.09 ABNORMAL GLUCOSE: ICD-10-CM

## 2021-10-14 LAB
ALBUMIN SERPL-MCNC: 3.5 G/DL (ref 3.4–5)
ALP SERPL-CCNC: 64 U/L (ref 40–150)
ALT SERPL W P-5'-P-CCNC: 21 U/L (ref 0–50)
ANION GAP SERPL CALCULATED.3IONS-SCNC: 3 MMOL/L (ref 3–14)
AST SERPL W P-5'-P-CCNC: 13 U/L (ref 0–45)
BILIRUB SERPL-MCNC: 0.7 MG/DL (ref 0.2–1.3)
BUN SERPL-MCNC: 21 MG/DL (ref 7–30)
CALCIUM SERPL-MCNC: 9.5 MG/DL (ref 8.5–10.1)
CHLORIDE BLD-SCNC: 106 MMOL/L (ref 94–109)
CHOLEST SERPL-MCNC: 244 MG/DL
CO2 SERPL-SCNC: 29 MMOL/L (ref 20–32)
CREAT SERPL-MCNC: 0.81 MG/DL (ref 0.52–1.04)
FASTING STATUS PATIENT QL REPORTED: YES
GFR SERPL CREATININE-BSD FRML MDRD: 72 ML/MIN/1.73M2
GLUCOSE BLD-MCNC: 99 MG/DL (ref 70–99)
HBA1C MFR BLD: 5.4 % (ref 0–5.6)
HDLC SERPL-MCNC: 84 MG/DL
LDLC SERPL CALC-MCNC: 135 MG/DL
NONHDLC SERPL-MCNC: 160 MG/DL
POTASSIUM BLD-SCNC: 4.6 MMOL/L (ref 3.4–5.3)
PROT SERPL-MCNC: 7.7 G/DL (ref 6.8–8.8)
SODIUM SERPL-SCNC: 138 MMOL/L (ref 133–144)
TRIGL SERPL-MCNC: 125 MG/DL

## 2021-10-14 PROCEDURE — 73560 X-RAY EXAM OF KNEE 1 OR 2: CPT | Mod: RT | Performed by: RADIOLOGY

## 2021-10-14 PROCEDURE — G0008 ADMIN INFLUENZA VIRUS VAC: HCPCS | Performed by: FAMILY MEDICINE

## 2021-10-14 PROCEDURE — 0004A COVID-19,PF,PFIZER (12+ YRS): CPT | Performed by: FAMILY MEDICINE

## 2021-10-14 PROCEDURE — 90662 IIV NO PRSV INCREASED AG IM: CPT | Performed by: FAMILY MEDICINE

## 2021-10-14 PROCEDURE — 91300 COVID-19,PF,PFIZER (12+ YRS): CPT | Performed by: FAMILY MEDICINE

## 2021-10-14 PROCEDURE — 99397 PER PM REEVAL EST PAT 65+ YR: CPT | Mod: 25 | Performed by: FAMILY MEDICINE

## 2021-10-14 PROCEDURE — 90472 IMMUNIZATION ADMIN EACH ADD: CPT | Performed by: FAMILY MEDICINE

## 2021-10-14 PROCEDURE — 90715 TDAP VACCINE 7 YRS/> IM: CPT | Performed by: FAMILY MEDICINE

## 2021-10-14 PROCEDURE — 80053 COMPREHEN METABOLIC PANEL: CPT | Performed by: FAMILY MEDICINE

## 2021-10-14 PROCEDURE — 80061 LIPID PANEL: CPT | Performed by: FAMILY MEDICINE

## 2021-10-14 PROCEDURE — 36415 COLL VENOUS BLD VENIPUNCTURE: CPT | Performed by: FAMILY MEDICINE

## 2021-10-14 PROCEDURE — 83036 HEMOGLOBIN GLYCOSYLATED A1C: CPT | Performed by: FAMILY MEDICINE

## 2021-10-14 RX ORDER — HYDROCHLOROTHIAZIDE 12.5 MG/1
12.5 TABLET ORAL DAILY
Qty: 90 TABLET | Refills: 3 | Status: SHIPPED | OUTPATIENT
Start: 2021-10-14 | End: 2022-10-17

## 2021-10-14 ASSESSMENT — ENCOUNTER SYMPTOMS
SORE THROAT: 0
HEARTBURN: 0
HEMATURIA: 0
CHILLS: 0
HEMATOCHEZIA: 0
ABDOMINAL PAIN: 0
PALPITATIONS: 0
HEADACHES: 0
WEAKNESS: 0
EYE PAIN: 0
CONSTIPATION: 0
MYALGIAS: 0
DIARRHEA: 0
ARTHRALGIAS: 1
NAUSEA: 0
DIZZINESS: 0
FEVER: 0
PARESTHESIAS: 0
BREAST MASS: 0
SHORTNESS OF BREATH: 0
COUGH: 0
NERVOUS/ANXIOUS: 0
JOINT SWELLING: 0
FREQUENCY: 0
DYSURIA: 0

## 2021-10-14 ASSESSMENT — ACTIVITIES OF DAILY LIVING (ADL): CURRENT_FUNCTION: NO ASSISTANCE NEEDED

## 2021-10-14 ASSESSMENT — MIFFLIN-ST. JEOR: SCORE: 1384.38

## 2021-10-14 NOTE — PROGRESS NOTES
"SUBJECTIVE:   Payton Gardiner is a 71 year old female who presents for Preventive Visit.      Patient has been advised of split billing requirements and indicates understanding: Yes   Are you in the first 12 months of your Medicare coverage?  No    Healthy Habits:     In general, how would you rate your overall health?  Good    Frequency of exercise:  2-3 days/week    Do you usually eat at least 4 servings of fruit and vegetables a day, include whole grains    & fiber and avoid regularly eating high fat or \"junk\" foods?  Yes    Taking medications regularly:  Yes    Medication side effects:  None    Ability to successfully perform activities of daily living:  No assistance needed    Home Safety:  No safety concerns identified    Hearing Impairment:  No hearing concerns    In the past 6 months, have you been bothered by leaking of urine?  No    In general, how would you rate your overall mental or emotional health?  Good      PHQ-2 Total Score: 0    Additional concerns today:  Yes    Concerns:  * recheck growth of right knee  * questions about colonoscopy   Would prefer not to do a repeat colonoscopy    Do you feel safe in your environment? Yes    Have you ever done Advance Care Planning? (For example, a Health Directive, POLST, or a discussion with a medical provider or your loved ones about your wishes): No, advance care planning information given to patient to review.  Patient plans to discuss their wishes with loved ones or provider.         Fall risk  Fallen 2 or more times in the past year?: Yes  Any fall with injury in the past year?: No  Timed Up and Go Test (>13.5 is fall risk; contact physician) : 5    Cognitive Screening   1) Repeat 3 items (Leader, Season, Table)    2) Clock draw: NORMAL  3) 3 item recall: Recalls 2 objects   Results: NORMAL clock, 1-2 items recalled: COGNITIVE IMPAIRMENT LESS LIKELY    Mini-CogTM Copyright S Janak. Licensed by the author for use in [facility]; reprinted with " permission ([email or web address]@[email or web address]). All rights reserved.      Do you have sleep apnea, excessive snoring or daytime drowsiness?: no    Reviewed and updated as needed this visit by clinical staff  Tobacco  Allergies  Meds   Med Hx  Surg Hx  Fam Hx  Soc Hx        Reviewed and updated as needed this visit by Provider  Tobacco  Allergies  Meds   Med Hx  Surg Hx  Fam Hx  Soc Hx       Social History     Tobacco Use     Smoking status: Former Smoker     Smokeless tobacco: Never Used     Tobacco comment: couple of cigs per day for about 30 years.   Substance Use Topics     Alcohol use: Yes     Alcohol/week: 0.0 standard drinks     Comment: socially         Alcohol Use 10/10/2021   Prescreen: >3 drinks/day or >7 drinks/week? No   Prescreen: >3 drinks/day or >7 drinks/week? -         Current providers sharing in care for this patient include:   Patient Care Team:  Araseli Frazier DO as PCP - General (Family Practice)  Luiz Acevedo DO as Referring Physician (Family Medicine - Sports Medicine)  Ilir Henning MD as MD (Orthopaedic Surgery)  Shanell Cardenas RN as Specialty Care Coordinator (Orthopedics)  Ilir Henning MD as Assigned Musculoskeletal Provider  Araseli Frazier DO as Assigned PCP    The following health maintenance items are reviewed in Epic and correct as of today:  Health Maintenance Due   Topic Date Due     ANNUAL REVIEW OF HM ORDERS  Never done     ZOSTER IMMUNIZATION (2 of 3) 03/02/2010     MAMMO SCREENING  06/12/2020       Review of Systems   Constitutional: Negative for chills and fever.   HENT: Positive for congestion. Negative for ear pain, hearing loss and sore throat.    Eyes: Negative for pain and visual disturbance.   Respiratory: Negative for cough and shortness of breath.    Cardiovascular: Negative for chest pain, palpitations and peripheral edema.   Gastrointestinal: Negative for abdominal pain, constipation, diarrhea, heartburn, hematochezia and  "nausea.   Breasts:  Negative for tenderness, breast mass and discharge.   Genitourinary: Negative for dysuria, frequency, genital sores, hematuria, pelvic pain, urgency, vaginal bleeding and vaginal discharge.   Musculoskeletal: Positive for arthralgias. Negative for joint swelling and myalgias.   Skin: Negative for rash.   Neurological: Negative for dizziness, weakness, headaches and paresthesias.   Psychiatric/Behavioral: Negative for mood changes. The patient is not nervous/anxious.          OBJECTIVE:   /74   Pulse 71   Temp 97.4  F (36.3  C) (Tympanic)   Ht 1.607 m (5' 3.25\")   Wt 90.6 kg (199 lb 12.8 oz)   SpO2 98%   Breastfeeding No   BMI 35.11 kg/m   Estimated body mass index is 35.11 kg/m  as calculated from the following:    Height as of this encounter: 1.607 m (5' 3.25\").    Weight as of this encounter: 90.6 kg (199 lb 12.8 oz).  Physical Exam  GENERAL APPEARANCE: healthy, alert and no distress  EYES: Eyes grossly normal to inspection, PERRL and conjunctivae and sclerae normal  NECK: no adenopathy, no asymmetry, masses, or scars and thyroid normal to palpation  RESP: lungs clear to auscultation - no rales, rhonchi or wheezes  BREAST: declined by pt  CV: regular rate and rhythm, normal S1 S2, no S3 or S4, no murmur, click or rub, no peripheral edema and peripheral pulses strong  ABDOMEN: soft, nontender, no hepatosplenomegaly, no masses and bowel sounds normal  MS: no musculoskeletal defects are noted and gait is age appropriate without ataxia  NEURO: Normal strength and tone, sensory exam grossly normal, mentation intact and speech normal  PSYCH: mentation appears normal and affect normal/bright    Diagnostic Test Results:  Labs reviewed in Epic    ASSESSMENT / PLAN:       ICD-10-CM    1. Encounter for Medicare annual wellness exam  Z00.00    2. Essential hypertension with goal blood pressure less than 140/90  I10 hydrochlorothiazide (HYDRODIURIL) 12.5 MG tablet     Comprehensive metabolic " "panel (BMP + Alb, Alk Phos, ALT, AST, Total. Bili, TP)     Comprehensive metabolic panel (BMP + Alb, Alk Phos, ALT, AST, Total. Bili, TP)   3. High priority for 2019-nCoV vaccine  Z23 COVID-19,PF,PFIZER   4. Bone lesion  M89.9 XR Knee Right 1/2 Views   5. Hyperlipidemia, unspecified hyperlipidemia type  E78.5 Lipid panel reflex to direct LDL Fasting     Lipid panel reflex to direct LDL Fasting   6. Abnormal glucose  R73.09 Hemoglobin A1c     Hemoglobin A1c     HTN:  Controlled, continue meds     Lipids:  Labs today, previously intolerant of statins    Bone lesion of knee:  Pt desires repeat imaging.  Per note from Dr Matheus li to image if desired for monitoring.    Patient has been advised of split billing requirements and indicates understanding: Yes  COUNSELING:  Reviewed preventive health counseling, as reflected in patient instructions    Estimated body mass index is 35.11 kg/m  as calculated from the following:    Height as of this encounter: 1.607 m (5' 3.25\").    Weight as of this encounter: 90.6 kg (199 lb 12.8 oz).    Weight management plan: Discussed healthy diet and exercise guidelines    She reports that she has quit smoking. She has never used smokeless tobacco.      Appropriate preventive services were discussed with this patient, including applicable screening as appropriate for cardiovascular disease, diabetes, osteopenia/osteoporosis, and glaucoma.  As appropriate for age/gender, discussed screening for colorectal cancer, prostate cancer, breast cancer, and cervical cancer. Checklist reviewing preventive services available has been given to the patient.    Reviewed patients plan of care and provided an AVS. The Intermediate Care Plan ( asthma action plan, low back pain action plan, and migraine action plan) for Payton meets the Care Plan requirement. This Care Plan has been established and reviewed with the Patient.    Counseling Resources:  ATP IV Guidelines  Pooled Cohorts Equation " Calculator  Breast Cancer Risk Calculator  Breast Cancer: Medication to Reduce Risk  FRAX Risk Assessment  ICSI Preventive Guidelines  Dietary Guidelines for Americans, 2010  Navigating Cancer's MyPlate  ASA Prophylaxis  Lung CA Screening    Araseli Frazier DO  [facility]    Identified Health Risks:

## 2021-10-14 NOTE — PATIENT INSTRUCTIONS
I think it is fine to be done with your colonoscopy screening if you prefer.      I will let you know what the radiologist has to say about the x-ray today and will follow up with Dr Jarvis if it has changed.      Preventive Health Recommendations    See your health care provider every year to    Review health changes.     Discuss preventive care.      Review your medicines if your doctor has prescribed any.    You no longer need a yearly Pap test unless you've had an abnormal Pap test in the past 10 years. If you have vaginal symptoms, such as bleeding or discharge, be sure to talk with your provider about a Pap test.    Every 1 to 2 years, have a mammogram.  If you are over 69, talk with your health care provider about whether or not you want to continue having screening mammograms.    Every 10 years, have a colonoscopy. Or, have a yearly FIT test (stool test). These exams will check for colon cancer.     Have a cholesterol test every 5 years, or more often if your doctor advises it.     Have a diabetes test (fasting glucose) every three years. If you are at risk for diabetes, you should have this test more often.     At age 65, have a bone density scan (DEXA) to check for osteoporosis (brittle bone disease).    Shots:    Get a flu shot each year.    Get a tetanus shot every 10 years.    Talk to your doctor about your pneumonia vaccines. There are now two you should receive - Pneumovax (PPSV 23) and Prevnar (PCV 13).    Talk to your pharmacist about the shingles vaccine.    Talk to your doctor about the hepatitis B vaccine.    Nutrition:     Eat at least 5 servings of fruits and vegetables each day.    Eat whole-grain bread, whole-wheat pasta and brown rice instead of white grains and rice.    Get adequate Calcium and Vitamin D.     Lifestyle    Exercise at least 150 minutes a week (30 minutes a day, 5 days a week). This will help you control your weight and prevent disease.    Limit alcohol to one drink per  day.    No smoking.     Wear sunscreen to prevent skin cancer.     See your dentist twice a year for an exam and cleaning.    See your eye doctor every 1 to 2 years to screen for conditions such as glaucoma, macular degeneration and cataracts.

## 2021-10-18 ENCOUNTER — MYC MEDICAL ADVICE (OUTPATIENT)
Dept: FAMILY MEDICINE | Facility: CLINIC | Age: 71
End: 2021-10-18

## 2021-10-18 NOTE — TELEPHONE ENCOUNTER
"Payton reports that she got dizzy on Saturday; 10/16/21.  Sunday it was worse and had  nausea.  Today the dizziness is present but lessened.   No nausea.   Took one dramamine at 9 AM. She said that it is helping.   When she is laying down and not moving; she is fine. When she gets up, she feels a little dizzy. \"But way better; I think the dramamine is helping.\"  Denies rash.  Denies breathing problems.  Denies headache.  Denies history of dizziness or vertigo.    Payton would like a response by Jose.  Thank you.   Clive Osborn, RN          "

## 2022-02-11 ENCOUNTER — ANCILLARY PROCEDURE (OUTPATIENT)
Dept: GENERAL RADIOLOGY | Facility: CLINIC | Age: 72
End: 2022-02-11
Attending: FAMILY MEDICINE
Payer: COMMERCIAL

## 2022-02-11 ENCOUNTER — OFFICE VISIT (OUTPATIENT)
Dept: ORTHOPEDICS | Facility: CLINIC | Age: 72
End: 2022-02-11
Payer: COMMERCIAL

## 2022-02-11 VITALS — DIASTOLIC BLOOD PRESSURE: 74 MMHG | SYSTOLIC BLOOD PRESSURE: 145 MMHG | WEIGHT: 199.8 LBS | BODY MASS INDEX: 35.11 KG/M2

## 2022-02-11 DIAGNOSIS — M70.61 TROCHANTERIC BURSITIS OF RIGHT HIP: ICD-10-CM

## 2022-02-11 DIAGNOSIS — M25.559 HIP PAIN: ICD-10-CM

## 2022-02-11 DIAGNOSIS — M25.551 RIGHT HIP PAIN: Primary | ICD-10-CM

## 2022-02-11 DIAGNOSIS — S79.911A HIP INJURY, RIGHT, INITIAL ENCOUNTER: ICD-10-CM

## 2022-02-11 PROCEDURE — 20611 DRAIN/INJ JOINT/BURSA W/US: CPT | Mod: RT | Performed by: FAMILY MEDICINE

## 2022-02-11 PROCEDURE — 73502 X-RAY EXAM HIP UNI 2-3 VIEWS: CPT | Mod: RT | Performed by: FAMILY MEDICINE

## 2022-02-11 PROCEDURE — 99214 OFFICE O/P EST MOD 30 MIN: CPT | Mod: 25 | Performed by: FAMILY MEDICINE

## 2022-02-11 RX ADMIN — LIDOCAINE HYDROCHLORIDE 2 ML: 10 INJECTION, SOLUTION INFILTRATION; PERINEURAL at 15:00

## 2022-02-11 RX ADMIN — TRIAMCINOLONE ACETONIDE 40 MG: 40 INJECTION, SUSPENSION INTRA-ARTICULAR; INTRAMUSCULAR at 15:00

## 2022-02-11 RX ADMIN — ROPIVACAINE HYDROCHLORIDE 3 ML: 5 INJECTION, SOLUTION EPIDURAL; INFILTRATION; PERINEURAL at 15:00

## 2022-02-11 ASSESSMENT — PAIN SCALES - GENERAL: PAINLEVEL: SEVERE PAIN (7)

## 2022-02-11 NOTE — LETTER
2022         RE: Payton Gardiner  [address] [place] [address]        Dear Colleague,    Thank you for referring your patient, Payton Gardiner, to the [facility]. Please see a copy of my visit note below.    Payton Gardiner  :  1950  DOS: 2022  MRN: [id number]    Sports Medicine Clinic Visit    PCP: Araseli Frazier    Payton Gardiner is a 71 year old female who is seen as a self referral presenting with right hip pain.    Injury: Fell onto her hip in 2021 into some decreative boulders while hauling wood. Initially was just a little sore but pain has increased. Pain located over right posterolateral hip without radiation. Reports constant pain. Additional Features:  Positive: pain only.  Symptoms are minimally better with heat and ibuprofen.  Symptoms are worse with: sleeping, walking, hip flexion and stairs.  Other evaluation and/or treatments so far consists of: ibuprofen and heating pad.  Recent imaging completed: No recent imaging completed.  Prior History of related problems: yes    Social History: retired    Review of Systems  Musculoskeletal: as above  Remainder of review of systems is negative including constitutional, CV, pulmonary, GI, Skin and Neurologic except as noted in HPI or medical history.    No past medical history on file.  Past Surgical History:   Procedure Laterality Date     CATARACT IOL, RT/LT       COLONOSCOPY  2016    incomplete     COLONOSCOPY  2016     DEXA - HIM SCAN  2015     EXCISE BREAST CYST/FIBROADENOMA/TUMOR/DUCT LESION/NIPPLE LESION/AREOLAR LESION Left 2001    left breast cyst aspiration     HYSTERECTOMY      took ovaries and cervix as well, removed due to cyst (benign)     Family History   Problem Relation Age of Onset     Hypertension Mother      Coronary Artery Disease Mother      Dementia Mother      Parkinsonism Mother      Other - See Comments Mother         lewy body disease      Macular Degeneration Mother      Cataracts Mother      Colon Cancer Father         age 93     Stomach Cancer Father      Breast Cancer Sister      Thyroid Cancer Sister      Brain Tumor Sister      Cataracts Sister      Tumor Sister         non-malignant brain tumor     Pancreatic Cancer Other      Diabetes No family hx of        Objective  BP (!) 145/74   Wt 90.6 kg (199 lb 12.8 oz)   BMI 35.11 kg/m        General: healthy, alert and in no distress      HEENT: no scleral icterus or conjunctival erythema     Skin: no suspicious lesions or rash. No jaundice.     CV: regular rhythm by palpation, 2+ distal pulses, no pedal edema      Resp: normal respiratory effort without conversational dyspnea     Psych: normal mood and affect      Gait: mildly antalgic, appropriate coordination and balance     Neuro: normal light touch sensory exam of the extremities. Motor strength as noted below     Right hip exam    Inspection:        no edema or ecchymosis in hip area    ROM:       Full active and passive ROM     Strength:        flexion 5/5       extension 5/5       abduction 5/5       adduction 5/5    Tender:        greater trochanter       SI joint       glute med       TFL, mild anterior hip    Non Tender:        remainder of hip area    Sensation:        grossly intact in hip and thigh    Skin:       well perfused       capillary refill brisk    Special Tests:        neg (-) CARMEN       neg (-) FADIR       neg (-) scour       positive (+) Diego    Large Joint Injection/Arthocentesis: R greater trochanteric bursa    Date/Time: 2/11/2022 3:00 PM  Performed by: Luiz Acevedo DO  Authorized by: Luiz Acevedo DO     Indications:  Pain  Needle Size:  25 G  Guidance: ultrasound    Approach:  Lateral  Location:  Hip      Site:  R greater trochanteric bursa  Medications:  3 mL ropivacaine 5 MG/ML; 40 mg triamcinolone 40 MG/ML; 2 mL lidocaine 1 %  Medications comment:  2 mL 0.5% Bupivacaine  NDC:  1176-9807-22  CBU: [id number]  Exp: 03/01/2023    Outcome:  Tolerated well, no immediate complications  Procedure discussed: discussed risks, benefits, and alternatives    Consent Given by:  Patient  Timeout: timeout called immediately prior to procedure    Prep: patient was prepped and draped in usual sterile fashion          Radiology  Recent Results (from the past 744 hour(s))   XR Pelvis w Hip RT 1 View    Narrative    PELVIS AND HIP RIGHT ONE VIEW  DATE/TIME: 2/11/2022 3:25 PM    INDICATION: Hip pain  COMPARISON: None available.      Impression    IMPRESSION: Anatomic alignment right hip. No acute displaced right hip  fracture. No significant hip joint space narrowing for age. Bilateral  acetabular over coverage/coxa profunda. No acute displaced pelvic  fracture. Both obturator rings intact. Degenerative change lower  lumbar spine.    TAZ MORENO MD         SYSTEM ID:  [id number]       Assessment:  1. Right hip pain    2. Hip injury, right, initial encounter    3. Trochanteric bursitis of right hip        Plan:  Discussed the assessment with the patient.  Follow up: prn based on clinical progress  Subacute right hip pain after injury, worsening lateral hip pain  Clinically consistent with gluteal tendinitis and hip bursitis after contusion  XR images independently visualized and reviewed with patient today in clinic  No significant degenerative change  Reviewed could be an intraarticular component, but less convincing on exam today  Trial of trochanteric bursa CSI today  PT options and low impact activity strategies reviewed  Oral Tylenol and topical Voltaren gel reviewed as safe OTC options, reviewed safe dosing strategies  Expectations and goals of CSI reviewed  Often 2-3 days for steroid effect, and can take up to two weeks for maximum effect  We discussed modified progressive pain-free activity as tolerated  Do not overuse in first two weeks if feeling better due to concern for vulnerability while steroid is  working  Supportive care reviewed  All questions were answered today  Contact us with additional questions or concerns  Signs and sx of concern reviewed      Luiz Acevedo DO, RYAN  Sports Medicine Physician  [facility]      Time spent in chart review, one-on-one evaluation, discussion with patient regarding: nature of problem, clinical course, prior treatments, therapeutic options, shared-decision making, potential procedures and referrals, and charting related to the visit: 32 minutes.  If applicable, time does not include time spent performing any procedure.      Disclaimer: This note consists of symbols derived from keyboarding, dictation and/or voice recognition software. As a result, there may be errors in the script that have gone undetected. Please consider this when interpreting information found in this chart.        Again, thank you for allowing me to participate in the care of your patient.        Sincerely,        Luiz Acevedo DO

## 2022-02-14 RX ORDER — LIDOCAINE HYDROCHLORIDE 10 MG/ML
2 INJECTION, SOLUTION INFILTRATION; PERINEURAL
Status: SHIPPED | OUTPATIENT
Start: 2022-02-11

## 2022-02-14 RX ORDER — TRIAMCINOLONE ACETONIDE 40 MG/ML
40 INJECTION, SUSPENSION INTRA-ARTICULAR; INTRAMUSCULAR
Status: SHIPPED | OUTPATIENT
Start: 2022-02-11

## 2022-02-14 RX ORDER — ROPIVACAINE HYDROCHLORIDE 5 MG/ML
3 INJECTION, SOLUTION EPIDURAL; INFILTRATION; PERINEURAL
Status: SHIPPED | OUTPATIENT
Start: 2022-02-11

## 2022-03-28 ENCOUNTER — MYC MEDICAL ADVICE (OUTPATIENT)
Dept: ORTHOPEDICS | Facility: CLINIC | Age: 72
End: 2022-03-28
Payer: COMMERCIAL

## 2022-03-28 DIAGNOSIS — S79.911A HIP INJURY, RIGHT, INITIAL ENCOUNTER: ICD-10-CM

## 2022-03-28 DIAGNOSIS — M70.61 TROCHANTERIC BURSITIS OF RIGHT HIP: ICD-10-CM

## 2022-03-28 DIAGNOSIS — M25.551 RIGHT HIP PAIN: Primary | ICD-10-CM

## 2022-04-06 ENCOUNTER — THERAPY VISIT (OUTPATIENT)
Dept: PHYSICAL THERAPY | Facility: CLINIC | Age: 72
End: 2022-04-06
Attending: FAMILY MEDICINE
Payer: COMMERCIAL

## 2022-04-06 DIAGNOSIS — M25.551 RIGHT HIP PAIN: ICD-10-CM

## 2022-04-06 DIAGNOSIS — R26.9 ABNORMAL GAIT: ICD-10-CM

## 2022-04-06 DIAGNOSIS — S79.911A HIP INJURY, RIGHT, INITIAL ENCOUNTER: ICD-10-CM

## 2022-04-06 DIAGNOSIS — M25.551 HIP PAIN, RIGHT: ICD-10-CM

## 2022-04-06 DIAGNOSIS — M70.61 TROCHANTERIC BURSITIS OF RIGHT HIP: ICD-10-CM

## 2022-04-06 PROCEDURE — 97110 THERAPEUTIC EXERCISES: CPT | Mod: GP | Performed by: PHYSICAL THERAPIST

## 2022-04-06 PROCEDURE — 97162 PT EVAL MOD COMPLEX 30 MIN: CPT | Mod: GP | Performed by: PHYSICAL THERAPIST

## 2022-04-06 ASSESSMENT — ACTIVITIES OF DAILY LIVING (ADL)
LIGHT_TO_MODERATE_WORK: MODERATE DIFFICULTY
ROLLING_OVER_IN_BED: MODERATE DIFFICULTY
WALKING_UP_STEEP_HILLS: EXTREME DIFFICULTY
WALKING_APPROXIMATELY_10_MINUTES: EXTREME DIFFICULTY
WALKING_DOWN_STEEP_HILLS: EXTREME DIFFICULTY
STANDING_FOR_15_MINUTES: MODERATE DIFFICULTY
TWISTING/PIVOTING_ON_INVOLVED_LEG: EXTREME DIFFICULTY
WALKING_15_MINUTES_OR_GREATER: EXTREME DIFFICULTY
WALKING_INITIALLY: EXTREME DIFFICULTY
GOING_DOWN_1_FLIGHT_OF_STAIRS: EXTREME DIFFICULTY
HEAVY_WORK: UNABLE TO DO
STEPPING_UP_AND_DOWN_CURBS: EXTREME DIFFICULTY
RECREATIONAL_ACTIVITIES: EXTREME DIFFICULTY
GETTING_INTO_AND_OUT_OF_AN_AVERAGE_CAR: EXTREME DIFFICULTY
GETTING_INTO_AND_OUT_OF_A_BATHTUB: EXTREME DIFFICULTY
PUTTING_ON_SOCKS_AND_SHOES: EXTREME DIFFICULTY
GOING_UP_1_FLIGHT_OF_STAIRS: EXTREME DIFFICULTY
SITTING_FOR_15_MINUTES: SLIGHT DIFFICULTY
HOW_WOULD_YOU_RATE_YOUR_CURRENT_LEVEL_OF_FUNCTION_DURING_YOUR_USUAL_ACTIVITIES_OF_DAILY_LIVING_FROM_0_TO_100_WITH_100_BEING_YOUR_LEVEL_OF_FUNCTION_PRIOR_TO_YOUR_HIP_PROBLEM_AND_0_BEING_THE_INABILITY_TO_PERFORM_ANY_OF_YOUR_USUAL_DAILY_ACTIVITIES?: 10

## 2022-04-06 NOTE — PROGRESS NOTES
"Physical Therapy Initial Evaluation  Subjective:  The history is provided by the patient. No  was used.   Patient Health History  Payton Gardiner being seen for Hip bursitis.          Pain is reported as 10/10 on pain scale.  General health as reported by patient is good.  Pertinent medical history includes: history of fractures (R lower leg).   Red flags:  None as reported by patient.   Other medical allergies details: see chart.   Surgeries include:  Other. Other surgery history details: Hysterectomy.    Current medications:  High blood pressure medication.    Current occupation is Retired.                     Therapist Generated HPI Evaluation  Problem details: Pt reports having had hip injections over the years but seemed like current episode has been most problematic.  Bike, injections, ice, heat having been only treatments.  Trochanteric injection 02/11/2022 that pt reports was helpful for about three weeks.  Referred to PT 03/28/2022.         Type of problem:  Right hip.    This is a recurrent condition.  Condition occurred with:  Other reason.  Where condition occurred: other.  Patient reports pain:  Posterior and lateral.  Pain quality: \"sharp ache\" and is constant.  Pain is the same all the time.  Since onset symptoms are unchanged (\"the same rotten pain\").  Exacerbated by: stairs, walking, getting in and out of the shower, \"putting weight on this leg\"  Relieved by: ice and heat temporarily.  Imaging testing: see imaging in chart.    Barriers include:  None as reported by patient.    \"To get rid of the pain.\"                    Objective:    Gait:  Pt ambulates without device, antalgic pattern favoring R with Trendelenburg.                                                     Hip Evaluation  Hip PROM:  Hip PROM:  Left Hip:    Normal  Right Hip:  Normal                          Hip Strength:    Flexion:   Left: 5-/5   -  Pain:  Right: 4+/5   -  Pain:                    Extension:  Left: " 4+/5  -  Pain:Right: 4/5    -  Pain:    Abduction:  Left: 4+/5    -   Pain:Right: 4-/5   +   Pain:  Adduction:  Left: 5-/5   -   Pain:Right: 5-/5   -  Pain:  Internal Rotation:  Left: 5-/5   -   Pain:Right: 4+/5   +  Pain:  External Rotation:  Left: 5-/5   -  Pain:  Right: 4/5   -  Pain:  Knee Flexion:  Left: 5-/5   -  Pain:Right: 5-/5   -  Pain:  Knee Extension:  Left: 5-/5   -  Pain:Right: 5-/5    -  Pain:          Hip Palpation:      Right hip tenderness present at:  Greater Trachanter and Gluteus Medius               Donaldo Lumbar Evaluation    Posture:  Sitting: fair  Standing: fair      Correction of Posture: no effect      Test Movements:  FIS: During: no effect  After: no effect    Repeat FIS: During: no effect  After: no effect    EIS: During: no effect  After: no effect    Repeat EIS: During: no effect  After: no effect                                                       ROS    Assessment/Plan:    Patient is a 72 year old female with right side hip complaints.    Patient has the following significant findings with corresponding treatment plan.                Diagnosis 1: Chronic recurrent R lateral hip pain  Pain -  hot/cold therapy and manual therapy  Decreased strength - therapeutic exercise and therapeutic activities  Impaired gait - gait training  Decreased function - therapeutic activities    Therapy Evaluation Codes:   1) History comprised of:   Personal factors that impact the plan of care:      Past/current experiences and Time since onset of symptoms.    Comorbidity factors that impact the plan of care are:      None.     Medications impacting care: None.  2) Examination of Body Systems comprised of:   Body structures and functions that impact the plan of care:      Hip.   Activity limitations that impact the plan of care are:      Stairs, Standing and Walking.  3) Clinical presentation characteristics are:   Evolving/Changing.  4) Decision-Making    Moderate complexity using standardized  patient assessment instrument and/or measureable assessment of functional outcome.  Cumulative Therapy Evaluation is: Moderate complexity.    Previous and current functional limitations:  (See Goal Flow Sheet for this information)    Short term and Long term goals: (See Goal Flow Sheet for this information)     Communication ability:  Patient appears to be able to clearly communicate and understand verbal and written communication and follow directions correctly.  Treatment Explanation - The following has been discussed with the patient:   RX ordered/plan of care  Anticipated outcomes  Possible risks and side effects  This patient would benefit from PT intervention to resume normal activities.   Rehab potential is good.    Frequency:  1 X week, once daily  Duration:  for 6 weeks  Discharge Plan:  Achieve all LTG.  Independent in home treatment program.  Reach maximal therapeutic benefit.    Please refer to the daily flowsheet for treatment today, total treatment time and time spent performing 1:1 timed codes.

## 2022-04-06 NOTE — PROGRESS NOTES
[facility]    OUTPATIENT Physical Therapy ORTHOPEDIC EVALUATION  PLAN OF TREATMENT FOR OUTPATIENT REHABILITATION  (COMPLETE FOR INITIAL CLAIMS ONLY)  Patient's Last Name, First Name, M.I.  YOB: 1950  Payton Gardiner    Provider s Name:  [facility]   Medical Record No.  [id number]   Start of Care Date:  04/06/22   Onset Date:   03/28/22   Type:     _X__PT   ___OT Medical Diagnosis:    Encounter Diagnoses   Name Primary?     Right hip pain      Hip injury, right, initial encounter      Trochanteric bursitis of right hip      Hip pain, right      Abnormal gait         Treatment Diagnosis:  R hip pain        Goals:     04/06/22 0500   Body Part   Goals listed below are for hip   Goal #1   Goal #1 ambulation   Previous Functional Level No restrictions   Current Functional Level Minutes patient can walk   Performance Level 5   STG Target Performance Minutes patient will be able to walk   Performance Level 15   Rationale for safe community ambulation   Due Date 04/27/22    LTG Target Performance Minutes patient will be able to  walk   Performance Level 30   Rationale for safe community ambulation   Due Date 05/18/22       Therapy Frequency:  once per week  Predicted Duration of Therapy Intervention:  six weeks    Jakub Almanza, EDISON                 I CERTIFY THE NEED FOR THESE SERVICES FURNISHED UNDER        THIS PLAN OF TREATMENT AND WHILE UNDER MY CARE     (Physician attestation of this document indicates review and certification of the therapy plan).                       Certification Date From:  04/06/22   Certification Date To:  05/18/22    Referring Provider:  Luiz Acevedo    Initial Assessment        See Epic Evaluation SOC Date: 04/06/22

## 2022-04-14 ENCOUNTER — THERAPY VISIT (OUTPATIENT)
Dept: PHYSICAL THERAPY | Facility: CLINIC | Age: 72
End: 2022-04-14
Payer: COMMERCIAL

## 2022-04-14 DIAGNOSIS — M25.551 HIP PAIN, RIGHT: Primary | ICD-10-CM

## 2022-04-14 DIAGNOSIS — R26.9 ABNORMAL GAIT: ICD-10-CM

## 2022-04-14 PROCEDURE — 97110 THERAPEUTIC EXERCISES: CPT | Mod: GP

## 2022-04-18 ENCOUNTER — THERAPY VISIT (OUTPATIENT)
Dept: PHYSICAL THERAPY | Facility: CLINIC | Age: 72
End: 2022-04-18
Payer: COMMERCIAL

## 2022-04-18 DIAGNOSIS — M25.551 HIP PAIN, RIGHT: Primary | ICD-10-CM

## 2022-04-18 DIAGNOSIS — R26.9 ABNORMAL GAIT: ICD-10-CM

## 2022-04-18 PROCEDURE — 97530 THERAPEUTIC ACTIVITIES: CPT | Mod: GP | Performed by: PHYSICAL THERAPIST

## 2022-04-18 PROCEDURE — 97110 THERAPEUTIC EXERCISES: CPT | Mod: GP | Performed by: PHYSICAL THERAPIST

## 2022-04-18 NOTE — PROGRESS NOTES
Subjective:  HPI  Physical Exam                    Objective:  System    Physical Exam    General     ROS    Assessment/Plan:    SUBJECTIVE  Subjective: Pt reports she is definitely progressing.  Doing quite well on level ground but still challenged by stairs.   Current Pain level:  (not rated numerically)   Changes in function:  Yes (See Goal flowsheet attached for changes in current functional level)     Adverse reaction to treatment or activity:  None    OBJECTIVE  Objective: Discomfort with resisted adduction and IR.  Pt ambulates without device, mild Trendelenburg B.     ASSESSMENT  Payton continues to require intervention to meet STG and LTG's: PT  Patient is progressing as expected.  Response to therapy has shown an improvement in  function  Progress made towards STG/LTG?  Yes (See Goal flowsheet attached for updates on achievement of STG and LTG)    PLAN  Current treatment program is being advanced to more complex exercises.  Advance toward single limb control?    PTA/ATC plan:  N/A    Please refer to the daily flowsheet for treatment today, total treatment time and time spent performing 1:1 timed codes.

## 2022-04-21 ENCOUNTER — MYC MEDICAL ADVICE (OUTPATIENT)
Dept: FAMILY MEDICINE | Facility: CLINIC | Age: 72
End: 2022-04-21
Payer: COMMERCIAL

## 2022-04-28 ENCOUNTER — THERAPY VISIT (OUTPATIENT)
Dept: PHYSICAL THERAPY | Facility: CLINIC | Age: 72
End: 2022-04-28
Payer: COMMERCIAL

## 2022-04-28 DIAGNOSIS — R26.9 ABNORMAL GAIT: ICD-10-CM

## 2022-04-28 DIAGNOSIS — M25.551 HIP PAIN, RIGHT: Primary | ICD-10-CM

## 2022-04-28 PROCEDURE — 97110 THERAPEUTIC EXERCISES: CPT | Mod: GP | Performed by: PHYSICAL THERAPIST

## 2022-04-28 ASSESSMENT — ACTIVITIES OF DAILY LIVING (ADL)
HOS_ADL_COUNT: 13
SITTING_FOR_15_MINUTES: NO DIFFICULTY AT ALL
DEEP_SQUATTING: NO DIFFICULTY AT ALL
WALKING_INITIALLY: NO DIFFICULTY AT ALL
GETTING_INTO_AND_OUT_OF_A_BATHTUB: SLIGHT DIFFICULTY
TWISTING/PIVOTING_ON_INVOLVED_LEG: SLIGHT DIFFICULTY
WALKING_15_MINUTES_OR_GREATER: SLIGHT DIFFICULTY
STANDING_FOR_15_MINUTES: NO DIFFICULTY AT ALL
LIGHT_TO_MODERATE_WORK: NO DIFFICULTY AT ALL
HOS_ADL_ITEM_SCORE_TOTAL: 46
STEPPING_UP_AND_DOWN_CURBS: SLIGHT DIFFICULTY
GETTING_INTO_AND_OUT_OF_AN_AVERAGE_CAR: NO DIFFICULTY AT ALL
HEAVY_WORK: SLIGHT DIFFICULTY
WALKING_APPROXIMATELY_10_MINUTES: NO DIFFICULTY AT ALL
RECREATIONAL_ACTIVITIES: NO DIFFICULTY AT ALL
HOS_ADL_HIGHEST_POTENTIAL_SCORE: 52
ROLLING_OVER_IN_BED: SLIGHT DIFFICULTY
HOW_WOULD_YOU_RATE_YOUR_CURRENT_LEVEL_OF_FUNCTION_DURING_YOUR_USUAL_ACTIVITIES_OF_DAILY_LIVING_FROM_0_TO_100_WITH_100_BEING_YOUR_LEVEL_OF_FUNCTION_PRIOR_TO_YOUR_HIP_PROBLEM_AND_0_BEING_THE_INABILITY_TO_PERFORM_ANY_OF_YOUR_USUAL_DAILY_ACTIVITIES?: 70
PUTTING_ON_SOCKS_AND_SHOES: NO DIFFICULTY AT ALL

## 2022-04-28 NOTE — PROGRESS NOTES
"Subjective:  HPI  Physical Exam                    Objective:  System    Physical Exam    General     ROS    Assessment/Plan:    SUBJECTIVE  Subjective: Pt reports she continues to progress, rated overall as 70%.  Much easier getting in and out of the shower.  Can still be achy on stairs but can now do them.  \"This has helped me.\"   Current Pain level:  (not rated numerically)   Changes in function:  Yes (See Goal flowsheet attached for changes in current functional level)     Adverse reaction to treatment or activity:  None    OBJECTIVE  Objective: No discomfort standing lumbar AROM all directions.  No discomfort resisted hip motions all directions with exception of \"a little ache\" on ER.  Negative hip PROM all directions and no tenderness to palpation.     ASSESSMENT  Payton continues to require intervention to meet STG and LTG's: PT  Patient is progressing as expected.  Response to therapy has shown an improvement in  function  Progress made towards STG/LTG?  Yes (See Goal flowsheet attached for updates on achievement of STG and LTG)    PLAN  Pt progressing well.  Plan f/u in about three weeks and consider discharge if no setbacks.    PTA/ATC plan:  N/A    Please refer to the daily flowsheet for treatment today, total treatment time and time spent performing 1:1 timed codes.        "

## 2022-05-20 ENCOUNTER — THERAPY VISIT (OUTPATIENT)
Dept: PHYSICAL THERAPY | Facility: CLINIC | Age: 72
End: 2022-05-20
Payer: COMMERCIAL

## 2022-05-20 DIAGNOSIS — M25.551 HIP PAIN, RIGHT: Primary | ICD-10-CM

## 2022-05-20 DIAGNOSIS — R26.9 ABNORMAL GAIT: ICD-10-CM

## 2022-05-20 PROCEDURE — 97110 THERAPEUTIC EXERCISES: CPT | Mod: GP | Performed by: PHYSICAL THERAPIST

## 2022-05-20 NOTE — PROGRESS NOTES
"Subjective:  HPI  Physical Exam                    Objective:  System    Physical Exam    General     ROS    Assessment/Plan:    DISCHARGE REPORT    Progress reporting period is from 04/06/2022 to 05/20/2022.       SUBJECTIVE  Subjective: Pt reports no longer having pain unless is walking \"too long,\" stairs, or housework.  Otherwise doing much better than when PT started.  Pleased with progress and comfortable with HEP in place.    Current Pain level:  (not rated numerically).     Initial Pain level: 10/10.   Changes in function:  Yes (See Goal flowsheet attached for changes in current functional level)  Adverse reaction to treatment or activity: None    OBJECTIVE  Objective: No tenderness to palpation.  No discomfort resisted hip motions all directions.  Negative SLR, PROM IR and ER.     ASSESSMENT/PLAN  Updated problem list and treatment plan: Diagnosis 1:  R hip pain -- home program  STG/LTGs have been met or progress has been made towards goals:  Yes (See Goal flow sheet completed today.)  Assessment of Progress: The patient's condition is improving.  Self Management Plans:  Patient is independent in a home treatment program.  I have re-evaluated this patient and find that the nature, scope, duration and intensity of the therapy is appropriate for the medical condition of the patient.  Payton continues to require the following intervention to meet STG and LTG's:  PT intervention is no longer required to meet STG/LTG.    Recommendations:  Given progress, pt agrees discharge to HEP but will let me know if there are further issues.    Please refer to the daily flowsheet for treatment today, total treatment time and time spent performing 1:1 timed codes.          "

## 2022-05-20 NOTE — PROGRESS NOTES
[facility]    OUTPATIENT Physical Therapy ORTHOPEDIC EVALUATION  PLAN OF TREATMENT FOR OUTPATIENT REHABILITATION  (COMPLETE FOR INITIAL CLAIMS ONLY)  Patient's Last Name, First Name, M.I.  YOB: 1950  Payton Gardiner    Provider s Name:  [facility]   Medical Record No.  [id number]   Start of Care Date:  04/06/22   Onset Date:   03/28/22   Type:     _X__PT   ___OT Medical Diagnosis:    Encounter Diagnoses   Name Primary?    Hip pain, right Yes    Abnormal gait         Treatment Diagnosis:  R hip pain        Goals:     05/20/22 0500   Body Part   Goals listed below are for hip   Goal #1   Goal #1 ambulation   Previous Functional Level No restrictions   Current Functional Level Minutes patient can walk   Performance Level 30   STG Target Performance Minutes patient will be able to walk   Performance Level 15   Rationale for safe community ambulation   Due Date 04/27/22   Date Goal Met 04/28/22    LTG Target Performance Minutes patient will be able to  walk   Performance Level 30   Rationale for safe community ambulation   Due Date 05/18/22   Date Goal Met 05/20/22       Therapy Frequency:     Predicted Duration of Therapy Intervention:       Jakub Almanza, PT                 I CERTIFY THE NEED FOR THESE SERVICES FURNISHED UNDER        THIS PLAN OF TREATMENT AND WHILE UNDER MY CARE     (Physician attestation of this document indicates review and certification of the therapy plan).                     Certification Date From:  05/19/22   Certification Date To:  05/20/22    Referring Provider:  Luiz Acevedo    Initial Assessment        See Epic Evaluation SOC Date: 04/06/22

## 2022-07-21 NOTE — NURSING NOTE
"Initial BP (!) 142/90   Pulse 60   Temp 97.8  F (36.6  C) (Tympanic)   Resp 12   Ht 1.626 m (5' 4\")   Wt 90.5 kg (199 lb 9.6 oz)   Breastfeeding? No   BMI 34.26 kg/m   Estimated body mass index is 34.26 kg/m  as calculated from the following:    Height as of this encounter: 1.626 m (5' 4\").    Weight as of this encounter: 90.5 kg (199 lb 9.6 oz). .      " alert/confused

## 2022-09-07 ENCOUNTER — OFFICE VISIT (OUTPATIENT)
Dept: PHYSICAL MEDICINE AND REHAB | Facility: CLINIC | Age: 72
End: 2022-09-07
Payer: COMMERCIAL

## 2022-09-07 VITALS — SYSTOLIC BLOOD PRESSURE: 166 MMHG | HEART RATE: 82 BPM | OXYGEN SATURATION: 91 % | DIASTOLIC BLOOD PRESSURE: 77 MMHG

## 2022-09-07 DIAGNOSIS — M70.62 GREATER TROCHANTERIC BURSITIS OF BOTH HIPS: Primary | ICD-10-CM

## 2022-09-07 DIAGNOSIS — M70.61 GREATER TROCHANTERIC BURSITIS OF BOTH HIPS: Primary | ICD-10-CM

## 2022-09-07 DIAGNOSIS — M79.18 MYOFASCIAL PAIN: ICD-10-CM

## 2022-09-07 PROCEDURE — 99204 OFFICE O/P NEW MOD 45 MIN: CPT | Performed by: PAIN MEDICINE

## 2022-09-07 RX ORDER — MELOXICAM 15 MG/1
7.5-15 TABLET ORAL DAILY PRN
Qty: 30 TABLET | Refills: 3 | Status: SHIPPED | OUTPATIENT
Start: 2022-09-07 | End: 2023-04-07

## 2022-09-07 ASSESSMENT — PAIN SCALES - GENERAL: PAINLEVEL: EXTREME PAIN (9)

## 2022-09-07 NOTE — LETTER
9/7/2022         RE: Payton Gardiner  [address] [place] [address]        Dear Colleague,    Thank you for referring your patient, Payton Gardiner, to the [facility]. Please see a copy of my visit note below.    ASSESSMENT: Payton Gardiner is a 72 year old female who presents for consultation at the request of [facility] PCP Araseli Frazier, with a past medical history significant for right greater trochanter bursitis, low back pain, right hip pain, vitamin D deficiency, obesity, hyperlipidemia, hypertension, osteoporosis, patellofemoral syndrome, sprain of wrist, bilateral cataracts, postmenopausal, abnormal gait who presents today for new patient evaluation of right low back and lateral thigh pain:    -Overall patient's physical exam is reassuring that she has normal strength and reflexes in her lower extremities.  She does have pain over bilateral greater trochanteric areas and likely has greater trochanter bursitis.  She did have a right greater trochanter bursa injection under ultrasound guidance in February 2022 with 3 weeks of 100% relief.    Patient is neurologically intact on exam. No myelopathic or red flag symptoms.     Diagnoses and all orders for this visit:  Greater trochanteric bursitis of both hips  -     PAIN US Large Joint Injection Bilateral; Future  -     meloxicam (MOBIC) 15 MG tablet; Take 0.5-1 tablets (7.5-15 mg) by mouth daily as needed for pain Take with food.  Myofascial pain  -     PAIN US Large Joint Injection Bilateral; Future  -     meloxicam (MOBIC) 15 MG tablet; Take 0.5-1 tablets (7.5-15 mg) by mouth daily as needed for pain Take with food.    PLAN:  Reviewed spine anatomy and disease process. Discussed diagnosis and treatment options with the patient today. A shared decision making model was used.  The patient's values and choices were respected. The following represents what was discussed and decided upon by the provider and the  patient.      -DIAGNOSTIC TESTS:  Images were personally reviewed and interpreted and explained to patient today using spine model.   -- X-ray of pelvis and hips dated 2/11/2022 is personally viewed images interpreted and discussed with the patient.  There is no acute displaced right hip fracture or significant hip joint space narrowing for age.  There is bilateral acetabular over coverage/coxa profunda.  There is no acute pelvic fracture.  There are degenerative changes in the lumbar spine.  -- Bone density scan on 6/1/2017 report is reviewed and shows osteopenia.    -PHYSICAL THERAPY: Patient's had several sessions of physical therapy without relief.  Discussed the importance of core strengthening, ROM, stretching exercises with the patient and how each of these entities is important in decreasing pain.  Explained to the patient that the purpose of physical therapy is to teach the patient a home exercise program.  These exercises need to be performed every day in order to decrease pain and prevent future occurrences of pain.        -MEDICATIONS: Meloxicam 15 mg that she can take once a day as needed with food.  She should not take ibuprofen or naproxen with this.  -  Discussed multiple medication options today with patient. Discussed risks, side effects, and proper use of medications. Patient verbalized understanding.    -INTERVENTIONS: I ordered bilateral greater trochanter bursa injections which had like to have her arrive tomorrow by 1 PM for these.  Should she not have relief with these I recommend MRI of lumbar spine and pelvis.  Discussed risks and benefits of injections with patient today.    -PATIENT EDUCATION: We discussed pain management in a multimodal fashion including physical therapy, medication management, possible future injections.    -FOLLOW-UP:   Patient will follow up 2 weeks after injections.    Advised patient to call the Spine Center if symptoms worsen or you have problems controlling  bladder and bowel function.   ______________________________________________________________________    SUBJECTIVE:  HPI:  Payton Gardiner  Is a 72 year old female who presents today for new patient evaluation of low back pain right posterior lateral thigh pain.  Patient was seen by sports medicine clinic on 2/11/2022.  She underwent several sessions of physical therapy.  Patient notes that she did have a greater trochanter bursa injection on 2/11/2022 with 3 weeks of 100% relief.  Pain has been worsening since then.  She started having pain in November 2020 one of the right greater than left lateral thigh and buttock area.  Pain is worse with walking, bearing weight and standing as well as doing hip exercises.  Sleeping on either side is very painful and wakes her up at night when she is rolling over.  If she has been walking or standing for a while sitting and laying down improves pain.  She is taking Aleve which gives her some relief.  She denies any bowel or bladder changes, fevers, chills, unintentional weight loss.  Her pain today is 9/10.    -Treatment to Date: X-ray of pelvis and hips dated 2/11/2022.  Bone density scan dated 6/1/2017.  Right greater trochanter bursa injection on 2/11/2022.    -Medications:    Current Outpatient Medications   Medication     Calcium Carb-Cholecalciferol (CALCIUM 600 + D PO)     Cholecalciferol (VITAMIN D) 2000 UNITS CAPS     FIBER PO     hydrochlorothiazide (HYDRODIURIL) 12.5 MG tablet     meloxicam (MOBIC) 15 MG tablet     vitamin B complex with vitamin C (STRESS TAB) tablet     Current Facility-Administered Medications   Medication     lidocaine 1 % injection 2 mL     ropivacaine (NAROPIN) injection 3 mL     triamcinolone (KENALOG-40) injection 40 mg       Allergies   Allergen Reactions     Hmg-Coa-R Inhibitors      Could hardly walk with STATINS     Adhesive Tape Rash     Macrodantin [Nitrofurantoin] Rash     Nickel Rash     Sulfa Drugs Rash       No past medical history on  file.     Patient Active Problem List   Diagnosis     Cataracts, bilateral     Alopecia areata     Breast cyst, left     Fibrocystic breast changes     Abnormal mammogram     Greater trochanteric bursitis, right     HCD (health care directive)     Hyperlipidemia     Low back pain     Osteoporosis     Patellofemoral syndrome     Postmenopausal     Sprain of wrist     Vitamin D deficiency     Abnormal glucose     BMI 30-40 with comorbid conditions. (IFG)     Essential hypertension with goal blood pressure less than 140/90     Hip pain, right     Abnormal gait       Past Surgical History:   Procedure Laterality Date     CATARACT IOL, RT/LT       COLONOSCOPY  08/03/2016    incomplete     COLONOSCOPY  08/04/2016     DEXA - HIM SCAN  05/13/2015     EXCISE BREAST CYST/FIBROADENOMA/TUMOR/DUCT LESION/NIPPLE LESION/AREOLAR LESION Left 05/24/2001    left breast cyst aspiration     HYSTERECTOMY  1998    took ovaries and cervix as well, removed due to cyst (benign)       Family History   Problem Relation Age of Onset     Hypertension Mother      Coronary Artery Disease Mother      Dementia Mother      Parkinsonism Mother      Other - See Comments Mother         lewy body disease     Macular Degeneration Mother      Cataracts Mother      Colon Cancer Father         age 93     Stomach Cancer Father      Breast Cancer Sister      Thyroid Cancer Sister      Brain Tumor Sister      Cataracts Sister      Tumor Sister         non-malignant brain tumor     Pancreatic Cancer Other      Diabetes No family hx of        Reviewed past medical, surgical, and family history with patient found on new patient intake packet located in EMR Media tab.     SOCIAL HX: She denies smoking or using recreational drugs.  She drinks alcohol very occasionally.    ROS: Specifically negative for bowel/bladder dysfunction, balance changes, headache, dizziness, foot drop, fevers, chills, appetite changes, nausea/vomiting, unexplained weight loss. Otherwise 13  systems reviewed are negative. Please see the patient's intake questionnaire from today for details.    OBJECTIVE:  BP (!) 166/77   Pulse 82   SpO2 91%     PHYSICAL EXAMINATION:    --CONSTITUTIONAL:  Vital signs as above.  No acute distress.  The patient is well nourished and well groomed.  --PSYCHIATRIC:  Appropriate mood and affect. The patient is awake, alert, oriented to person, place, time and answering questions appropriately with clear speech.    --SKIN:  Skin over the face, bilateral lower extremities, and posterior torso is clean, dry, intact without rashes.    --RESPIRATORY: Normal rhythm and effort. No abnormal accessory muscle breathing patterns noted.   --ABDOMINAL:  Soft, non-distended, non-tender throughout all quadrants.   --STANDING EXAMINATION:  Normal lumbar lordosis noted, no lateral shift.  --MUSCULOSKELETAL: Lumbar spine inspection reveals no evidence of deformity. Range of motion is mildly limited in lumbar flexion, extension, lateral rotation.  Tenderness palpation over the greater trochanter bursa area right greater than left. Straight leg raising in the supine position is negative to radicular pain.   --SACROILIAC JOINT: Negative distraction.  Positive bilaterally Beth's with reproduction of pain to affected extremity.  One Finger point test negative.  --GROSS MOTOR: Gait is antalgic. Easily arises from a seated position. Toe walking and heel walking are normal without significant difficulty.    --LOWER EXTREMITY MOTOR TESTING:  Plantar flexion left 5/5, right 5/5   Dorsiflexion left 5/5, right 5/5   Great toe MTP extension left 5/5, right 5/5  Knee flexion left 5/5, right 5/5  Knee extension left 5/5, right 5/5   Hip flexion left 5/5, right 5/5  Hip abduction left 5/5, right 5/5  Hip adduction left 5/5, right 5/5   --HIPS: Full range of motion bilaterally.  Stinchfield test is negative bilaterally.  --NEUROLOGICAL:  2/4 patellar and achilles reflexes bilaterally.  Sensation to light  touch is intact in the bilateral L4, L5, and S1 dermatomes. Babinski is negative. No clonus.   --VASCULAR:  2/4 dorsalis pedis pulses bilaterally.  Bilateral lower extremities are warm.  There is no pitting edema of the bilateral lower extremities.    RESULTS: Prior medical records from [facility] sports medicine were reviewed today.    Imaging: Hip and pelvis imaging was personally reviewed and interpreted today.       Again, thank you for allowing me to participate in the care of your patient.        Sincerely,        Freddie Alejandra, DO

## 2022-09-07 NOTE — PROGRESS NOTES
ASSESSMENT: Payton Gardiner is a 72 year old female who presents for consultation at the request of [facility] PCP Araseli Frazier, with a past medical history significant for right greater trochanter bursitis, low back pain, right hip pain, vitamin D deficiency, obesity, hyperlipidemia, hypertension, osteoporosis, patellofemoral syndrome, sprain of wrist, bilateral cataracts, postmenopausal, abnormal gait who presents today for new patient evaluation of right low back and lateral thigh pain:    -Overall patient's physical exam is reassuring that she has normal strength and reflexes in her lower extremities.  She does have pain over bilateral greater trochanteric areas and likely has greater trochanter bursitis.  She did have a right greater trochanter bursa injection under ultrasound guidance in February 2022 with 3 weeks of 100% relief.    Patient is neurologically intact on exam. No myelopathic or red flag symptoms.     Diagnoses and all orders for this visit:  Greater trochanteric bursitis of both hips  -     PAIN US Large Joint Injection Bilateral; Future  -     meloxicam (MOBIC) 15 MG tablet; Take 0.5-1 tablets (7.5-15 mg) by mouth daily as needed for pain Take with food.  Myofascial pain  -     PAIN US Large Joint Injection Bilateral; Future  -     meloxicam (MOBIC) 15 MG tablet; Take 0.5-1 tablets (7.5-15 mg) by mouth daily as needed for pain Take with food.    PLAN:  Reviewed spine anatomy and disease process. Discussed diagnosis and treatment options with the patient today. A shared decision making model was used.  The patient's values and choices were respected. The following represents what was discussed and decided upon by the provider and the patient.      -DIAGNOSTIC TESTS:  Images were personally reviewed and interpreted and explained to patient today using spine model.   -- X-ray of pelvis and hips dated 2/11/2022 is personally viewed images interpreted and discussed with the patient.  There  is no acute displaced right hip fracture or significant hip joint space narrowing for age.  There is bilateral acetabular over coverage/coxa profunda.  There is no acute pelvic fracture.  There are degenerative changes in the lumbar spine.  -- Bone density scan on 6/1/2017 report is reviewed and shows osteopenia.    -PHYSICAL THERAPY: Patient's had several sessions of physical therapy without relief.  Discussed the importance of core strengthening, ROM, stretching exercises with the patient and how each of these entities is important in decreasing pain.  Explained to the patient that the purpose of physical therapy is to teach the patient a home exercise program.  These exercises need to be performed every day in order to decrease pain and prevent future occurrences of pain.        -MEDICATIONS: Meloxicam 15 mg that she can take once a day as needed with food.  She should not take ibuprofen or naproxen with this.  -  Discussed multiple medication options today with patient. Discussed risks, side effects, and proper use of medications. Patient verbalized understanding.    -INTERVENTIONS: I ordered bilateral greater trochanter bursa injections which had like to have her arrive tomorrow by 1 PM for these.  Should she not have relief with these I recommend MRI of lumbar spine and pelvis.  Discussed risks and benefits of injections with patient today.    -PATIENT EDUCATION: We discussed pain management in a multimodal fashion including physical therapy, medication management, possible future injections.    -FOLLOW-UP:   Patient will follow up 2 weeks after injections.    Advised patient to call the Sp[facility] Center if symptoms worsen or you have problems controlling bladder and bowel function.   ______________________________________________________________________    SUBJECTIVE:  HPI:  Payton Gardiner  Is a 72 year old female who presents today for new patient evaluation of low back pain right posterior lateral thigh pain.   Patient was seen by sports medicine clinic on 2/11/2022.  She underwent several sessions of physical therapy.  Patient notes that she did have a greater trochanter bursa injection on 2/11/2022 with 3 weeks of 100% relief.  Pain has been worsening since then.  She started having pain in November 2020 one of the right greater than left lateral thigh and buttock area.  Pain is worse with walking, bearing weight and standing as well as doing hip exercises.  Sleeping on either side is very painful and wakes her up at night when she is rolling over.  If she has been walking or standing for a while sitting and laying down improves pain.  She is taking Aleve which gives her some relief.  She denies any bowel or bladder changes, fevers, chills, unintentional weight loss.  Her pain today is 9/10.    -Treatment to Date: X-ray of pelvis and hips dated 2/11/2022.  Bone density scan dated 6/1/2017.  Right greater trochanter bursa injection on 2/11/2022.    -Medications:    Current Outpatient Medications   Medication     Calcium Carb-Cholecalciferol (CALCIUM 600 + D PO)     Cholecalciferol (VITAMIN D) 2000 UNITS CAPS     FIBER PO     hydrochlorothiazide (HYDRODIURIL) 12.5 MG tablet     meloxicam (MOBIC) 15 MG tablet     vitamin B complex with vitamin C (STRESS TAB) tablet     Current Facility-Administered Medications   Medication     lidocaine 1 % injection 2 mL     ropivacaine (NAROPIN) injection 3 mL     triamcinolone (KENALOG-40) injection 40 mg       Allergies   Allergen Reactions     Hmg-Coa-R Inhibitors      Could hardly walk with STATINS     Adhesive Tape Rash     Macrodantin [Nitrofurantoin] Rash     Nickel Rash     Sulfa Drugs Rash       No past medical history on file.     Patient Active Problem List   Diagnosis     Cataracts, bilateral     Alopecia areata     Breast cyst, left     Fibrocystic breast changes     Abnormal mammogram     Greater trochanteric bursitis, right     HCD (health care directive)     Hyperlipidemia      Low back pain     Osteoporosis     Patellofemoral syndrome     Postmenopausal     Sprain of wrist     Vitamin D deficiency     Abnormal glucose     BMI 30-40 with comorbid conditions. (IFG)     Essential hypertension with goal blood pressure less than 140/90     Hip pain, right     Abnormal gait       Past Surgical History:   Procedure Laterality Date     CATARACT IOL, RT/LT       COLONOSCOPY  08/03/2016    incomplete     COLONOSCOPY  08/04/2016     DEXA - HIM SCAN  05/13/2015     EXCISE BREAST CYST/FIBROADENOMA/TUMOR/DUCT LESION/NIPPLE LESION/AREOLAR LESION Left 05/24/2001    left breast cyst aspiration     HYSTERECTOMY  1998    took ovaries and cervix as well, removed due to cyst (benign)       Family History   Problem Relation Age of Onset     Hypertension Mother      Coronary Artery Disease Mother      Dementia Mother      Parkinsonism Mother      Other - See Comments Mother         lewy body disease     Macular Degeneration Mother      Cataracts Mother      Colon Cancer Father         age 93     Stomach Cancer Father      Breast Cancer Sister      Thyroid Cancer Sister      Brain Tumor Sister      Cataracts Sister      Tumor Sister         non-malignant brain tumor     Pancreatic Cancer Other      Diabetes No family hx of        Reviewed past medical, surgical, and family history with patient found on new patient intake packet located in EMR Media tab.     SOCIAL HX: She denies smoking or using recreational drugs.  She drinks alcohol very occasionally.    ROS: Specifically negative for bowel/bladder dysfunction, balance changes, headache, dizziness, foot drop, fevers, chills, appetite changes, nausea/vomiting, unexplained weight loss. Otherwise 13 systems reviewed are negative. Please see the patient's intake questionnaire from today for details.    OBJECTIVE:  BP (!) 166/77   Pulse 82   SpO2 91%     PHYSICAL EXAMINATION:    --CONSTITUTIONAL:  Vital signs as above.  No acute distress.  The patient is well  nourished and well groomed.  --PSYCHIATRIC:  Appropriate mood and affect. The patient is awake, alert, oriented to person, place, time and answering questions appropriately with clear speech.    --SKIN:  Skin over the face, bilateral lower extremities, and posterior torso is clean, dry, intact without rashes.    --RESPIRATORY: Normal rhythm and effort. No abnormal accessory muscle breathing patterns noted.   --ABDOMINAL:  Soft, non-distended, non-tender throughout all quadrants.   --STANDING EXAMINATION:  Normal lumbar lordosis noted, no lateral shift.  --MUSCULOSKELETAL: Lumbar spine inspection reveals no evidence of deformity. Range of motion is mildly limited in lumbar flexion, extension, lateral rotation.  Tenderness palpation over the greater trochanter bursa area right greater than left. Straight leg raising in the supine position is negative to radicular pain.   --SACROILIAC JOINT: Negative distraction.  Positive bilaterally Beth's with reproduction of pain to affected extremity.  One Finger point test negative.  --GROSS MOTOR: Gait is antalgic. Easily arises from a seated position. Toe walking and heel walking are normal without significant difficulty.    --LOWER EXTREMITY MOTOR TESTING:  Plantar flexion left 5/5, right 5/5   Dorsiflexion left 5/5, right 5/5   Great toe MTP extension left 5/5, right 5/5  Knee flexion left 5/5, right 5/5  Knee extension left 5/5, right 5/5   Hip flexion left 5/5, right 5/5  Hip abduction left 5/5, right 5/5  Hip adduction left 5/5, right 5/5   --HIPS: Full range of motion bilaterally.  Stinchfield test is negative bilaterally.  --NEUROLOGICAL:  2/4 patellar and achilles reflexes bilaterally.  Sensation to light touch is intact in the bilateral L4, L5, and S1 dermatomes. Babinski is negative. No clonus.   --VASCULAR:  2/4 dorsalis pedis pulses bilaterally.  Bilateral lower extremities are warm.  There is no pitting edema of the bilateral lower extremities.    RESULTS: Prior  medical records from [facility] were reviewed today.    Imaging: Hip and pelvis imaging was personally reviewed and interpreted today.

## 2022-09-08 ENCOUNTER — ANCILLARY PROCEDURE (OUTPATIENT)
Dept: MAMMOGRAPHY | Facility: CLINIC | Age: 72
End: 2022-09-08
Attending: FAMILY MEDICINE
Payer: COMMERCIAL

## 2022-09-08 ENCOUNTER — RADIOLOGY INJECTION OFFICE VISIT (OUTPATIENT)
Dept: PHYSICAL MEDICINE AND REHAB | Facility: CLINIC | Age: 72
End: 2022-09-08
Attending: PAIN MEDICINE
Payer: COMMERCIAL

## 2022-09-08 VITALS
SYSTOLIC BLOOD PRESSURE: 110 MMHG | TEMPERATURE: 97.7 F | DIASTOLIC BLOOD PRESSURE: 66 MMHG | OXYGEN SATURATION: 96 % | HEART RATE: 67 BPM

## 2022-09-08 DIAGNOSIS — M70.62 GREATER TROCHANTERIC BURSITIS OF BOTH HIPS: ICD-10-CM

## 2022-09-08 DIAGNOSIS — M79.18 MYOFASCIAL PAIN: ICD-10-CM

## 2022-09-08 DIAGNOSIS — M70.61 GREATER TROCHANTERIC BURSITIS OF BOTH HIPS: ICD-10-CM

## 2022-09-08 DIAGNOSIS — Z12.31 VISIT FOR SCREENING MAMMOGRAM: ICD-10-CM

## 2022-09-08 PROCEDURE — 77067 SCR MAMMO BI INCL CAD: CPT

## 2022-09-08 PROCEDURE — 20611 DRAIN/INJ JOINT/BURSA W/US: CPT | Mod: 50 | Performed by: PAIN MEDICINE

## 2022-09-08 RX ORDER — METHYLPREDNISOLONE ACETATE 40 MG/ML
INJECTION, SUSPENSION INTRA-ARTICULAR; INTRALESIONAL; INTRAMUSCULAR; SOFT TISSUE
Status: COMPLETED | OUTPATIENT
Start: 2022-09-08 | End: 2022-09-08

## 2022-09-08 RX ORDER — LIDOCAINE HYDROCHLORIDE 20 MG/ML
INJECTION, SOLUTION EPIDURAL; INFILTRATION; INTRACAUDAL; PERINEURAL
Status: COMPLETED | OUTPATIENT
Start: 2022-09-08 | End: 2022-09-08

## 2022-09-08 RX ORDER — LIDOCAINE HYDROCHLORIDE 10 MG/ML
INJECTION, SOLUTION EPIDURAL; INFILTRATION; INTRACAUDAL; PERINEURAL
Status: COMPLETED | OUTPATIENT
Start: 2022-09-08 | End: 2022-09-08

## 2022-09-08 RX ADMIN — METHYLPREDNISOLONE ACETATE 40 MG: 40 INJECTION, SUSPENSION INTRA-ARTICULAR; INTRALESIONAL; INTRAMUSCULAR; SOFT TISSUE at 13:17

## 2022-09-08 RX ADMIN — LIDOCAINE HYDROCHLORIDE 7 ML: 20 INJECTION, SOLUTION EPIDURAL; INFILTRATION; INTRACAUDAL; PERINEURAL at 13:18

## 2022-09-08 RX ADMIN — LIDOCAINE HYDROCHLORIDE 1 ML: 10 INJECTION, SOLUTION EPIDURAL; INFILTRATION; INTRACAUDAL; PERINEURAL at 13:17

## 2022-09-08 ASSESSMENT — PAIN SCALES - GENERAL
PAINLEVEL: NO PAIN (1)
PAINLEVEL: EXTREME PAIN (9)

## 2022-09-08 NOTE — PATIENT INSTRUCTIONS
Follow-up visit with Dr. Alejandra in 2 weeks to discuss injection outcome and determine care plan going forward.       DISCHARGE INSTRUCTIONS    During office hours (8:00 a.m.- 4:00 p.m.) questions or concerns may be answered  by calling [facility] Navigation Nurses at  [phone].  Messages received after hours will be returned the following business day.      In the case of an emergency, please dial 911 or seek assistance at the nearest Emergency Room/Urgent Care facility.     All Patients:    You may experience an increase in your symptoms for the first 2 days (It may take anywhere between 2 days- 2 weeks for the steroid to have maximum effect).    You may use ice on the injection site, as frequently as 20 minutes each hour if needed.    You may take your pain medicine.    You may continue taking your regular medication after your injection. If you have had a Medial Branch Block you may resume pain medication once your pain diary is completed.    You may shower. No swimming, tub bath or hot tub for 48 hours.  You may remove your bandaid/bandage as soon as you are home.    You may resume light activities, as tolerated.    Resume your usual diet as tolerated.    It is strongly advised that you do not drive for 1-3 hours post injection.    If you have had oral sedation:  Do not drive for 8 hours post injection.      If you have had IV sedation:  Do not drive for 24 hours post injection.  Do not operate hazardous machinery or make important personal/business decisions for 24 hours.      POSSIBLE STEROID SIDE EFFECTS (If steroid/cortisone was used for your procedure)    -If you experience these symptoms, it should only last for a short period    Swelling of the legs              Skin redness (flushing)     Mouth (oral) irritation   Blood sugar (glucose) levels            Sweats                    Mood changes  Headache  Sleeplessness  Weakened immune system for up to 14 days, which could increase the risk of  thomas the COVID-19 virus and/or experiencing more severe symptoms of the disease, if exposed.  Decreased effectiveness of the flu vaccine if given within 2 weeks of the steroid.         POSSIBLE PROCEDURE SIDE EFFECTS  -Call the Spine Center if you are concerned  Increased Pain           Increased numbness/tingling      Nausea/Vomiting          Bruising/bleeding at site      Redness or swelling                                              Difficulty walking      Weakness           Fever greater than 100.5    *In the event of a severe headache after an epidural steroid injection that is relieved by lying down, please call the [facility] to speak with a clinical staff member*

## 2022-09-11 ENCOUNTER — HEALTH MAINTENANCE LETTER (OUTPATIENT)
Age: 72
End: 2022-09-11

## 2022-09-23 ENCOUNTER — OFFICE VISIT (OUTPATIENT)
Dept: PHYSICAL MEDICINE AND REHAB | Facility: CLINIC | Age: 72
End: 2022-09-23
Payer: COMMERCIAL

## 2022-09-23 VITALS — OXYGEN SATURATION: 92 % | DIASTOLIC BLOOD PRESSURE: 77 MMHG | HEART RATE: 72 BPM | SYSTOLIC BLOOD PRESSURE: 162 MMHG

## 2022-09-23 DIAGNOSIS — M79.18 MYOFASCIAL PAIN: ICD-10-CM

## 2022-09-23 DIAGNOSIS — M70.61 GREATER TROCHANTERIC BURSITIS OF BOTH HIPS: Primary | ICD-10-CM

## 2022-09-23 DIAGNOSIS — M70.62 GREATER TROCHANTERIC BURSITIS OF BOTH HIPS: Primary | ICD-10-CM

## 2022-09-23 PROCEDURE — 99213 OFFICE O/P EST LOW 20 MIN: CPT | Performed by: PAIN MEDICINE

## 2022-09-23 ASSESSMENT — PAIN SCALES - GENERAL: PAINLEVEL: NO PAIN (1)

## 2022-09-23 NOTE — PATIENT INSTRUCTIONS
Recommend that you start doing your home physical therapy exercises again.    ~Please call Nurse Navigation line [phone] with any questions or concerns about your treatment plan, if symptoms worsen and you would like to be seen urgently, or if you have problems controlling bladder and bowel function.

## 2022-09-23 NOTE — PROGRESS NOTES
Assessment:     Diagnoses and all orders for this visit:  Greater trochanteric bursitis of both hips  Myofascial pain     Payton Gardiner is a 72 year old y.o. female with past medical history significant for right greater trochanter bursitis, low back pain, right hip pain, vitamin D deficiency, obesity, hyperlipidemia, hypertension, osteoporosis, patellofemoral syndrome, sprain of wrist, bilateral cataracts, postmenopausal, abnormal gait who presents today for follow-up regarding bilateral lateral thigh pain:    -Patient received 95% relief with her bilateral greater trochanter bursa injections.  She is feeling much better at this time.     Plan:     A shared decision making plan was used. The patient's values and choices were respected. Prior medical records from our last visit on 9/7/2022 were reviewed today. The following represents what was discussed and decided upon by the provider and the patient.        -DIAGNOSTIC TESTS: Images were personally reviewed and interpreted.   --X-ray of pelvis and hips dated 2/11/2022 is personally viewed images interpreted and discussed with the patient.  There is no acute displaced right hip fracture or significant hip joint space narrowing for age.  There is bilateral acetabular over coverage/coxa profunda.  There is no acute pelvic fracture.  There are degenerative changes in the lumbar spine.  -- Bone density scan on 6/1/2017 report is reviewed and shows osteopenia.    -INTERVENTIONS: No interventions at this time.    -MEDICATIONS: No changes to medications.  -  Discussed side effects of medications and proper use. Patient verbalized understanding.    -PHYSICAL THERAPY: Recommend that she start doing her home exercises again from physical therapy.      -PATIENT EDUCATION: We discussed pain management in a multimodal fashion including physical therapy, medication management, possible future injection.  We also discussed possible future MRI.    -FOLLOW UP: Patient will follow  up in 4 weeks.  Advised to contact clinic if symptoms worsen or change.    Subjective:     Payton Gardiner is a 72 year old female who presents today for follow-up regarding bilateral greater trochanter bursa injection under ultrasound guidance.  She notes 95% relief.  She is no longer having a sharp pain more of a dull pain.  Her pain today is 1/10.  She notes that when she is trying to lift her foot over a curb or go up and down stairs that she has worsening of pain in the right buttock area.  She also notices when she is rolling over in bed the pain is worse.  She is been able to walk further and do better without pain in that motion.  She can also go on a treadmill now.  Calling in and out of bed is painful as is stepping into the shower.  She had physical therapy in March.  She had changed her exercises to more weightlifting type exercises with machines when her pain worsened recently.  She has not doing home exercises at this time, however is happy to restart.  She denies any bowel or bladder changes, fevers, chills, unintentional weight loss.    -Treatment to Date: X-ray of pelvis and hips dated 2/11/2022.  Bone density scan dated 6/1/2017.  Right greater trochanter bursa injection on 2/11/2022.  Bilateral greater trochanter injections under ultrasound guidance on 9/8/2022.    Patient Active Problem List   Diagnosis     Cataracts, bilateral     Alopecia areata     Breast cyst, left     Fibrocystic breast changes     Abnormal mammogram     Greater trochanteric bursitis, right     HCD (health care directive)     Hyperlipidemia     Low back pain     Osteoporosis     Patellofemoral syndrome     Postmenopausal     Sprain of wrist     Vitamin D deficiency     Abnormal glucose     BMI 30-40 with comorbid conditions. (IFG)     Essential hypertension with goal blood pressure less than 140/90     Hip pain, right     Abnormal gait       Current Outpatient Medications   Medication     Calcium Carb-Cholecalciferol (CALCIUM  600 + D PO)     Cholecalciferol (VITAMIN D) 2000 UNITS CAPS     FIBER PO     hydrochlorothiazide (HYDRODIURIL) 12.5 MG tablet     meloxicam (MOBIC) 15 MG tablet     vitamin B complex with vitamin C (STRESS TAB) tablet     Current Facility-Administered Medications   Medication     lidocaine 1 % injection 2 mL     ropivacaine (NAROPIN) injection 3 mL     triamcinolone (KENALOG-40) injection 40 mg       Allergies   Allergen Reactions     Hmg-Coa-R Inhibitors      Could hardly walk with STATINS     Adhesive Tape Rash     Macrodantin [Nitrofurantoin] Rash     Nickel Rash     Sulfa Drugs Rash       No past medical history on file.     Review of Systems  ROS:  Specifically negative for bowel/bladder dysfunction, balance changes, headache, dizziness, foot drop, fevers, chills, appetite changes, nausea/vomiting, unexplained weight loss. Otherwise 13 systems reviewed are negative. Please see the patient's intake questionnaire from today for details.    Reviewed Social, Family, Past Medical and Past Surgical history with patient, no significant changes noted since prior visit.     Objective:     BP (!) 162/77   Pulse 72   SpO2 92%     PHYSICAL EXAMINATION:    --CONSTITUTIONAL: Well developed, well nourished, healthy appearing individual.  --PSYCHIATRIC: Appropriate mood and affect. No difficulty interacting due to temper, social withdrawal, or memory issues.  --SKIN: Lumbar region is dry and intact.   --RESPIRATORY: Normal rhythm and effort. No abnormal accessory muscle breathing patterns noted.   --MUSCULOSKELETAL:  Normal lumbar lordosis noted, no lateral shift.  --GROSS MOTOR: Easily arises from a seated position. Gait is non-antalgic  --LUMBAR SPINE:  Inspection reveals no evidence of deformity. Range of motion is mildly limited in lumbar flexion, extension, lateral rotation.  Tenderness to palpation across her low back. Straight leg raising in the seated position is negative to radicular pain.  --SACROILIAC JOINT: One  Finger point test negative.  --LOWER EXTREMITY MOTOR TESTING:  Plantar flexion left 5/5, right 5/5   Dorsiflexion left 5/5, right 5/5   Great toe MTP extension left 5/5, right 5/5  Knee flexion left 5/5, right 5/5  Knee extension left 5/5, right 5/5   Hip flexion left 5/5, right 5/5  Hip abduction left 5/5, right 5/5  Hip adduction left 5/5, right 5/5    --NEUROLOGIC:  Sensation to light touch is intact in the bilateral L4, L5, and S1 dermatomes.    RESULTS:   Imaging: Lumbar spine imaging was reviewed today. The images were shown to the patient and the findings were explained using a spine model.    MA Screen Bilateral w/Maik    Result Date: 9/12/2022  BILATERAL FULL FIELD DIGITAL SCREENING MAMMOGRAM WITH TOMOSYNTHESIS Performed on: 9/8/22 Compared to: 07/21/2021, 07/21/2020, 06/11/2019, 06/12/2018, and 06/01/2017 Technique:  This study was evaluated with the assistance of Computer-Aided Detection.  Breast Tomosynthesis was used in interpretation. Findings: The breasts have scattered areas of fibroglandular density.  There is no radiographic evidence of malignancy.     IMPRESSION: ACR BI-RADS Category 1: Negative RECOMMENDED FOLLOW-UP: Annual routine screening mammogram The results and recommendations of this examination will be communicated to the patient.     PAIN US Large Joint Injection Bilateral    Result Date: 9/8/2022  Procedure: Ultrasound-guided bilateral hip peritrochanteric bursal region injection Procedure Date: Pre Procedure Diagnosis:  Bilateral greater trochanter bursitis Post Procedure Diagnosis:  Same Procedure Performed:  Bilateral greater trochanter bursa injections with Ultrasound Guidance Clinical Scenario:  As per office notes Vital Signs:  As per rooming/preprocedure documentation Side Injected: Bilateral After discussing the risks, benefits, and alternatives to the procedure, the patient expressed understanding and wished to proceed.  The patient was brought to the procedure suite and placed  in the left lateral decubitus position.  A procedural pause was conducted to verify:  correct patient identity, procedure to be performed and as applicable, correct side and site, correct patient position, and availability of implants, special equipment or special requirements.  A simple surgical tray was used.  Prior to the procedure, the right hip peritrochanteric bursal region was examined with a 3.75 MHz curvilinear transducer to visualize the right hip peritrochanteric bursal region and determine the optimal needle path.  Following this, the groin was prepared with a ChloraPrep scrub, then re-examined using the same transducer, a sterile ultrasound transducer cover, and ultrasound transducer gel.  Local anesthesia was obtained with 1 mL of 1% lidocaine. Thereafter, using ultrasound guidance, a 3.5 inch, 22 gauge needle was advanced into the right hip peritrochanteric bursal region. After visualization of the tip in the target area and negative aspiration for blood, a mixture of 20 mg of Depo-Medrol and 3.5 cc of 2% lidocaine was injected into the right hip peritrochanteric bursal region. Following the injection, the needle was withdrawn.  The exact same procedure was done on the left side.  20 mg of Depo-Medrol and 3.5 cc of 2% lidocaine were injected. The patient tolerated the procedure well and there were no apparent complications.  After an appropriate amount of observation, the patient was dismissed from the clinic in good condition under their own power. Pre-pain score: 9/10 Post pain score:  1/10

## 2022-09-23 NOTE — LETTER
9/23/2022         RE: Payton Gardiner  [address] [place] [address]        Dear Colleague,    Thank you for referring your patient, Payton Gardiner, to the [facility]. Please see a copy of my visit note below.      Assessment:     Diagnoses and all orders for this visit:  Greater trochanteric bursitis of both hips  Myofascial pain     Payton Gardiner is a 72 year old y.o. female with past medical history significant for right greater trochanter bursitis, low back pain, right hip pain, vitamin D deficiency, obesity, hyperlipidemia, hypertension, osteoporosis, patellofemoral syndrome, sprain of wrist, bilateral cataracts, postmenopausal, abnormal gait who presents today for follow-up regarding bilateral lateral thigh pain:    -Patient received 95% relief with her bilateral greater trochanter bursa injections.  She is feeling much better at this time.     Plan:     A shared decision making plan was used. The patient's values and choices were respected. Prior medical records from our last visit on 9/7/2022 were reviewed today. The following represents what was discussed and decided upon by the provider and the patient.        -DIAGNOSTIC TESTS: Images were personally reviewed and interpreted.   --X-ray of pelvis and hips dated 2/11/2022 is personally viewed images interpreted and discussed with the patient.  There is no acute displaced right hip fracture or significant hip joint space narrowing for age.  There is bilateral acetabular over coverage/coxa profunda.  There is no acute pelvic fracture.  There are degenerative changes in the lumbar spine.  -- Bone density scan on 6/1/2017 report is reviewed and shows osteopenia.    -INTERVENTIONS: No interventions at this time.    -MEDICATIONS: No changes to medications.  -  Discussed side effects of medications and proper use. Patient verbalized understanding.    -PHYSICAL THERAPY: Recommend that she start doing her home exercises again  from physical therapy.      -PATIENT EDUCATION: We discussed pain management in a multimodal fashion including physical therapy, medication management, possible future injection.  We also discussed possible future MRI.    -FOLLOW UP: Patient will follow up in 4 weeks.  Advised to contact clinic if symptoms worsen or change.    Subjective:     Payton Gardiner is a 72 year old female who presents today for follow-up regarding bilateral greater trochanter bursa injection under ultrasound guidance.  She notes 95% relief.  She is no longer having a sharp pain more of a dull pain.  Her pain today is 1/10.  She notes that when she is trying to lift her foot over a curb or go up and down stairs that she has worsening of pain in the right buttock area.  She also notices when she is rolling over in bed the pain is worse.  She is been able to walk further and do better without pain in that motion.  She can also go on a treadmill now.  Calling in and out of bed is painful as is stepping into the shower.  She had physical therapy in March.  She had changed her exercises to more weightlifting type exercises with machines when her pain worsened recently.  She has not doing home exercises at this time, however is happy to restart.  She denies any bowel or bladder changes, fevers, chills, unintentional weight loss.    -Treatment to Date: X-ray of pelvis and hips dated 2/11/2022.  Bone density scan dated 6/1/2017.  Right greater trochanter bursa injection on 2/11/2022.  Bilateral greater trochanter injections under ultrasound guidance on 9/8/2022.    Patient Active Problem List   Diagnosis     Cataracts, bilateral     Alopecia areata     Breast cyst, left     Fibrocystic breast changes     Abnormal mammogram     Greater trochanteric bursitis, right     HCD (health care directive)     Hyperlipidemia     Low back pain     Osteoporosis     Patellofemoral syndrome     Postmenopausal     Sprain of wrist     Vitamin D deficiency     Abnormal  glucose     BMI 30-40 with comorbid conditions. (IFG)     Essential hypertension with goal blood pressure less than 140/90     Hip pain, right     Abnormal gait       Current Outpatient Medications   Medication     Calcium Carb-Cholecalciferol (CALCIUM 600 + D PO)     Cholecalciferol (VITAMIN D) 2000 UNITS CAPS     FIBER PO     hydrochlorothiazide (HYDRODIURIL) 12.5 MG tablet     meloxicam (MOBIC) 15 MG tablet     vitamin B complex with vitamin C (STRESS TAB) tablet     Current Facility-Administered Medications   Medication     lidocaine 1 % injection 2 mL     ropivacaine (NAROPIN) injection 3 mL     triamcinolone (KENALOG-40) injection 40 mg       Allergies   Allergen Reactions     Hmg-Coa-R Inhibitors      Could hardly walk with STATINS     Adhesive Tape Rash     Macrodantin [Nitrofurantoin] Rash     Nickel Rash     Sulfa Drugs Rash       No past medical history on file.     Review of Systems  ROS:  Specifically negative for bowel/bladder dysfunction, balance changes, headache, dizziness, foot drop, fevers, chills, appetite changes, nausea/vomiting, unexplained weight loss. Otherwise 13 systems reviewed are negative. Please see the patient's intake questionnaire from today for details.    Reviewed Social, Family, Past Medical and Past Surgical history with patient, no significant changes noted since prior visit.     Objective:     BP (!) 162/77   Pulse 72   SpO2 92%     PHYSICAL EXAMINATION:    --CONSTITUTIONAL: Well developed, well nourished, healthy appearing individual.  --PSYCHIATRIC: Appropriate mood and affect. No difficulty interacting due to temper, social withdrawal, or memory issues.  --SKIN: Lumbar region is dry and intact.   --RESPIRATORY: Normal rhythm and effort. No abnormal accessory muscle breathing patterns noted.   --MUSCULOSKELETAL:  Normal lumbar lordosis noted, no lateral shift.  --GROSS MOTOR: Easily arises from a seated position. Gait is non-antalgic  --LUMBAR SPINE:  Inspection reveals no  evidence of deformity. Range of motion is mildly limited in lumbar flexion, extension, lateral rotation.  Tenderness to palpation across her low back. Straight leg raising in the seated position is negative to radicular pain.  --SACROILIAC JOINT: One Finger point test negative.  --LOWER EXTREMITY MOTOR TESTING:  Plantar flexion left 5/5, right 5/5   Dorsiflexion left 5/5, right 5/5   Great toe MTP extension left 5/5, right 5/5  Knee flexion left 5/5, right 5/5  Knee extension left 5/5, right 5/5   Hip flexion left 5/5, right 5/5  Hip abduction left 5/5, right 5/5  Hip adduction left 5/5, right 5/5    --NEUROLOGIC:  Sensation to light touch is intact in the bilateral L4, L5, and S1 dermatomes.    RESULTS:   Imaging: Lumbar spine imaging was reviewed today. The images were shown to the patient and the findings were explained using a spine model.    MA Screen Bilateral w/Maik    Result Date: 9/12/2022  BILATERAL FULL FIELD DIGITAL SCREENING MAMMOGRAM WITH TOMOSYNTHESIS Performed on: 9/8/22 Compared to: 07/21/2021, 07/21/2020, 06/11/2019, 06/12/2018, and 06/01/2017 Technique:  This study was evaluated with the assistance of Computer-Aided Detection.  Breast Tomosynthesis was used in interpretation. Findings: The breasts have scattered areas of fibroglandular density.  There is no radiographic evidence of malignancy.     IMPRESSION: ACR BI-RADS Category 1: Negative RECOMMENDED FOLLOW-UP: Annual routine screening mammogram The results and recommendations of this examination will be communicated to the patient.     PAIN US Large Joint Injection Bilateral    Result Date: 9/8/2022  Procedure: Ultrasound-guided bilateral hip peritrochanteric bursal region injection Procedure Date: Pre Procedure Diagnosis:  Bilateral greater trochanter bursitis Post Procedure Diagnosis:  Same Procedure Performed:  Bilateral greater trochanter bursa injections with Ultrasound Guidance Clinical Scenario:  As per office notes Vital Signs:  As per  rooming/preprocedure documentation Side Injected: Bilateral After discussing the risks, benefits, and alternatives to the procedure, the patient expressed understanding and wished to proceed.  The patient was brought to the procedure suite and placed in the left lateral decubitus position.  A procedural pause was conducted to verify:  correct patient identity, procedure to be performed and as applicable, correct side and site, correct patient position, and availability of implants, special equipment or special requirements.  A simple surgical tray was used.  Prior to the procedure, the right hip peritrochanteric bursal region was examined with a 3.75 MHz curvilinear transducer to visualize the right hip peritrochanteric bursal region and determine the optimal needle path.  Following this, the groin was prepared with a ChloraPrep scrub, then re-examined using the same transducer, a sterile ultrasound transducer cover, and ultrasound transducer gel.  Local anesthesia was obtained with 1 mL of 1% lidocaine. Thereafter, using ultrasound guidance, a 3.5 inch, 22 gauge needle was advanced into the right hip peritrochanteric bursal region. After visualization of the tip in the target area and negative aspiration for blood, a mixture of 20 mg of Depo-Medrol and 3.5 cc of 2% lidocaine was injected into the right hip peritrochanteric bursal region. Following the injection, the needle was withdrawn.  The exact same procedure was done on the left side.  20 mg of Depo-Medrol and 3.5 cc of 2% lidocaine were injected. The patient tolerated the procedure well and there were no apparent complications.  After an appropriate amount of observation, the patient was dismissed from the clinic in good condition under their own power. Pre-pain score: 9/10 Post pain score:  1/10                               Again, thank you for allowing me to participate in the care of your patient.        Sincerely,        Freddie Alejandra, DO

## 2022-10-17 ENCOUNTER — OFFICE VISIT (OUTPATIENT)
Dept: FAMILY MEDICINE | Facility: CLINIC | Age: 72
End: 2022-10-17
Payer: COMMERCIAL

## 2022-10-17 ENCOUNTER — ANCILLARY PROCEDURE (OUTPATIENT)
Dept: GENERAL RADIOLOGY | Facility: CLINIC | Age: 72
End: 2022-10-17
Attending: FAMILY MEDICINE
Payer: COMMERCIAL

## 2022-10-17 VITALS
OXYGEN SATURATION: 98 % | TEMPERATURE: 97.5 F | HEART RATE: 68 BPM | SYSTOLIC BLOOD PRESSURE: 138 MMHG | BODY MASS INDEX: 34.35 KG/M2 | DIASTOLIC BLOOD PRESSURE: 74 MMHG | HEIGHT: 64 IN | WEIGHT: 201.2 LBS

## 2022-10-17 DIAGNOSIS — M89.9 BONE LESION: ICD-10-CM

## 2022-10-17 DIAGNOSIS — Z13.29 SCREENING FOR HYPOTHYROIDISM: ICD-10-CM

## 2022-10-17 DIAGNOSIS — Z00.00 ENCOUNTER FOR MEDICARE ANNUAL WELLNESS EXAM: Primary | ICD-10-CM

## 2022-10-17 DIAGNOSIS — Z78.0 MENOPAUSE: ICD-10-CM

## 2022-10-17 DIAGNOSIS — R73.09 ABNORMAL GLUCOSE: ICD-10-CM

## 2022-10-17 DIAGNOSIS — E78.5 HYPERLIPIDEMIA, UNSPECIFIED HYPERLIPIDEMIA TYPE: ICD-10-CM

## 2022-10-17 DIAGNOSIS — I10 ESSENTIAL HYPERTENSION WITH GOAL BLOOD PRESSURE LESS THAN 140/90: ICD-10-CM

## 2022-10-17 PROBLEM — E66.01 MORBID OBESITY (H): Status: RESOLVED | Noted: 2017-12-05 | Resolved: 2022-10-17

## 2022-10-17 LAB
ALBUMIN SERPL BCG-MCNC: 4.2 G/DL (ref 3.5–5.2)
ALP SERPL-CCNC: 61 U/L (ref 35–104)
ALT SERPL W P-5'-P-CCNC: 16 U/L (ref 10–35)
ANION GAP SERPL CALCULATED.3IONS-SCNC: 12 MMOL/L (ref 7–15)
AST SERPL W P-5'-P-CCNC: 18 U/L (ref 10–35)
BILIRUB SERPL-MCNC: 0.6 MG/DL
BUN SERPL-MCNC: 21.5 MG/DL (ref 8–23)
CALCIUM SERPL-MCNC: 9.8 MG/DL (ref 8.8–10.2)
CHLORIDE SERPL-SCNC: 102 MMOL/L (ref 98–107)
CHOLEST SERPL-MCNC: 239 MG/DL
CREAT SERPL-MCNC: 0.94 MG/DL (ref 0.51–0.95)
DEPRECATED HCO3 PLAS-SCNC: 27 MMOL/L (ref 22–29)
GFR SERPL CREATININE-BSD FRML MDRD: 63 ML/MIN/1.73M2
GLUCOSE SERPL-MCNC: 111 MG/DL (ref 70–99)
HBA1C MFR BLD: 5.4 % (ref 0–5.6)
HDLC SERPL-MCNC: 72 MG/DL
LDLC SERPL CALC-MCNC: 142 MG/DL
NONHDLC SERPL-MCNC: 167 MG/DL
POTASSIUM SERPL-SCNC: 3.9 MMOL/L (ref 3.4–5.3)
PROT SERPL-MCNC: 7.5 G/DL (ref 6.4–8.3)
SODIUM SERPL-SCNC: 141 MMOL/L (ref 136–145)
TRIGL SERPL-MCNC: 125 MG/DL

## 2022-10-17 PROCEDURE — 80061 LIPID PANEL: CPT | Performed by: FAMILY MEDICINE

## 2022-10-17 PROCEDURE — 0124A COVID-19,PF,PFIZER BOOSTER BIVALENT: CPT | Performed by: FAMILY MEDICINE

## 2022-10-17 PROCEDURE — 80053 COMPREHEN METABOLIC PANEL: CPT | Performed by: FAMILY MEDICINE

## 2022-10-17 PROCEDURE — G0439 PPPS, SUBSEQ VISIT: HCPCS | Performed by: FAMILY MEDICINE

## 2022-10-17 PROCEDURE — 83036 HEMOGLOBIN GLYCOSYLATED A1C: CPT | Performed by: FAMILY MEDICINE

## 2022-10-17 PROCEDURE — 36415 COLL VENOUS BLD VENIPUNCTURE: CPT | Performed by: FAMILY MEDICINE

## 2022-10-17 PROCEDURE — 73560 X-RAY EXAM OF KNEE 1 OR 2: CPT | Mod: TC | Performed by: RADIOLOGY

## 2022-10-17 PROCEDURE — 91312 COVID-19,PF,PFIZER BOOSTER BIVALENT: CPT | Performed by: FAMILY MEDICINE

## 2022-10-17 RX ORDER — HYDROCHLOROTHIAZIDE 12.5 MG/1
12.5 TABLET ORAL DAILY
Qty: 90 TABLET | Refills: 3 | Status: SHIPPED | OUTPATIENT
Start: 2022-10-17

## 2022-10-17 ASSESSMENT — ENCOUNTER SYMPTOMS
CONSTIPATION: 0
HEMATURIA: 0
CHILLS: 0
DIARRHEA: 0
PARESTHESIAS: 0
DYSURIA: 0
FEVER: 0
SHORTNESS OF BREATH: 0
NAUSEA: 0
HEADACHES: 0
ABDOMINAL PAIN: 0
EYE PAIN: 0
NERVOUS/ANXIOUS: 0
DIZZINESS: 0
FREQUENCY: 0
WEAKNESS: 0
MYALGIAS: 1
HEMATOCHEZIA: 0
PALPITATIONS: 0
ARTHRALGIAS: 1
SORE THROAT: 0
COUGH: 0

## 2022-10-17 ASSESSMENT — ACTIVITIES OF DAILY LIVING (ADL): CURRENT_FUNCTION: NO ASSISTANCE NEEDED

## 2022-10-17 NOTE — PROGRESS NOTES
"SUBJECTIVE:   Payton is a 72 year old who presents for Preventive Visit.      Patient has been advised of split billing requirements and indicates understanding: Yes  Are you in the first 12 months of your Medicare coverage?  No    Healthy Habits:     In general, how would you rate your overall health?  Good    Frequency of exercise:  2-3 days/week    Duration of exercise:  15-30 minutes    Do you usually eat at least 4 servings of fruit and vegetables a day, include whole grains    & fiber and avoid regularly eating high fat or \"junk\" foods?  No    Taking medications regularly:  Yes    Medication side effects:  None    Ability to successfully perform activities of daily living:  No assistance needed    Home Safety:  No safety concerns identified    Hearing Impairment:  No hearing concerns    In the past 6 months, have you been bothered by leaking of urine? Yes    In general, how would you rate your overall mental or emotional health?  Good      PHQ-2 Total Score: 0    Additional concerns today:  Yes    Concerns:  * right knee tumor - planning annual x-ray to confirm stability.  No new knee symptoms.    Do you feel safe in your environment? Yes    Have you ever done Advance Care Planning? (For example, a Health Directive, POLST, or a discussion with a medical provider or your loved ones about your wishes): Yes, advance care planning is on file.       Fall risk  Fallen 2 or more times in the past year?: No  Any fall with injury in the past year?: Yes    Cognitive Screening   1) Repeat 3 items (Leader, Season, Table)    2) Clock draw: NORMAL  3) 3 item recall: Recalls 3 objects  Results: 3 items recalled: COGNITIVE IMPAIRMENT LESS LIKELY    Mini-CogTM Copyright RY Briceno. Licensed by the author for use in [facility]; reprinted with permission (beronica@.[facility]). All rights reserved.      Do you have sleep apnea, excessive snoring or daytime drowsiness?: no    Reviewed and updated as needed this visit by clinical " staff   Tobacco  Allergies  Meds   Med Hx  Surg Hx  Fam Hx  Soc Hx        Reviewed and updated as needed this visit by Provider   Tobacco  Allergies  Meds   Med Hx  Surg Hx  Fam Hx  Soc Hx       Social History     Tobacco Use     Smoking status: Former     Smokeless tobacco: Never     Tobacco comments:     couple of cigs per day for about 30 years.   Substance Use Topics     Alcohol use: Yes     Alcohol/week: 0.0 standard drinks     Comment: socially         Alcohol Use 10/17/2022   Prescreen: >3 drinks/day or >7 drinks/week? No   Prescreen: >3 drinks/day or >7 drinks/week? -         Current providers sharing in care for this patient include:  Patient Care Team:  Araseli Frazier DO as PCP - General (Family Practice)  Luiz Acevedo DO as Referring Physician (Family Medicine - Sports Medicine)  Ilir Henning MD as MD (Orthopaedic Surgery)  Shanell Cardenas RN as Specialty Care Coordinator (Orthopedics)  Araseli Frazier DO as Assigned PCP  Luiz Acevedo DO as Assigned Musculoskeletal Provider    The following health maintenance items are reviewed in Epic and correct as of today:  Health Maintenance   Topic Date Due     ANNUAL REVIEW OF HM ORDERS  Never done     HEPATITIS B IMMUNIZATION (1 of 3 - 3-dose series) Never done     MEDICARE ANNUAL WELLNESS VISIT  10/14/2022     FALL RISK ASSESSMENT  10/17/2023     MAMMO SCREENING  09/08/2024     LIPID  10/14/2026     ADVANCE CARE PLANNING  10/17/2027     DTAP/TDAP/TD IMMUNIZATION (3 - Td or Tdap) 10/14/2031     DEXA  06/01/2032     HEPATITIS C SCREENING  Completed     PHQ-2 (once per calendar year)  Completed     INFLUENZA VACCINE  Completed     Pneumococcal Vaccine: 65+ Years  Completed     ZOSTER IMMUNIZATION  Completed     COVID-19 Vaccine  Completed     IPV IMMUNIZATION  Aged Out     MENINGITIS IMMUNIZATION  Aged Out     COLORECTAL CANCER SCREENING  Discontinued     LUNG CANCER SCREENING  Discontinued       Review of  "Systems   Constitutional: Negative for chills and fever.   HENT: Negative for congestion, ear pain, hearing loss and sore throat.    Eyes: Negative for pain and visual disturbance.   Respiratory: Negative for cough and shortness of breath.    Cardiovascular: Negative for chest pain, palpitations and peripheral edema.   Gastrointestinal: Negative for abdominal pain, constipation, diarrhea, hematochezia and nausea.   Genitourinary: Negative for dysuria, frequency, genital sores, hematuria and urgency.   Musculoskeletal: Positive for arthralgias and myalgias.   Skin: Negative for rash.   Neurological: Negative for dizziness, weakness, headaches and paresthesias.   Psychiatric/Behavioral: Negative for mood changes. The patient is not nervous/anxious.      Following with ortho for hip pain.  Improving.  Has follow up scheduled    OBJECTIVE:   /74   Pulse 68   Temp 97.5  F (36.4  C) (Tympanic)   Ht 1.619 m (5' 3.75\")   Wt 91.3 kg (201 lb 3.2 oz)   SpO2 98%   BMI 34.81 kg/m   Estimated body mass index is 34.81 kg/m  as calculated from the following:    Height as of this encounter: 1.619 m (5' 3.75\").    Weight as of this encounter: 91.3 kg (201 lb 3.2 oz).  Physical Exam  GENERAL APPEARANCE: healthy, alert and no distress  EYES: Eyes grossly normal to inspection, PERRL and conjunctivae and sclerae normal  NECK: no adenopathy, no asymmetry, masses, or scars and thyroid normal to palpation  RESP: lungs clear to auscultation - no rales, rhonchi or wheezes  BREAST: decline by pt  CV: regular rate and rhythm, normal S1 S2, no S3 or S4, no murmur, click or rub, no peripheral edema and peripheral pulses strong  ABDOMEN: soft, nontender, no hepatosplenomegaly, no masses and bowel sounds normal  MS: no musculoskeletal defects are noted and gait is age appropriate without ataxia  NEURO: Normal strength and tone, sensory exam grossly normal, mentation intact and speech normal  PSYCH: mentation appears normal and affect " "normal/bright    Diagnostic Test Results:  Labs reviewed in Epic    ASSESSMENT / PLAN:       ICD-10-CM    1. Encounter for Medicare annual wellness exam  Z00.00       2. Bone lesion  M89.9 XR Knee Standing Right 2 Views      3. Essential hypertension with goal blood pressure less than 140/90  I10 Comprehensive metabolic panel (BMP + Alb, Alk Phos, ALT, AST, Total. Bili, TP)     hydrochlorothiazide (HYDRODIURIL) 12.5 MG tablet     Comprehensive metabolic panel (BMP + Alb, Alk Phos, ALT, AST, Total. Bili, TP)      4. Hyperlipidemia, unspecified hyperlipidemia type  E78.5 Lipid panel reflex to direct LDL Fasting     Lipid panel reflex to direct LDL Fasting      5. Screening for hypothyroidism  Z13.29 TSH with free T4 reflex      6. Abnormal glucose  R73.09 Hemoglobin A1c     Hemoglobin A1c      7. Menopause  Z78.0 DX Hip/Pelvis/Spine        Bone lesion:  Has been stable on follow up.  No new knee symptoms.  X-ray today    HTN:  Controlled, continue meds    Lipids:  Intolerant to statins, labs today        Patient has been advised of split billing requirements and indicates understanding: Yes    COUNSELING:  Reviewed preventive health counseling, as reflected in patient instructions    Estimated body mass index is 34.81 kg/m  as calculated from the following:    Height as of this encounter: 1.619 m (5' 3.75\").    Weight as of this encounter: 91.3 kg (201 lb 3.2 oz).    Weight management plan: Discussed healthy diet and exercise guidelines    She reports that she has quit smoking. She has never used smokeless tobacco.      Appropriate preventive services were discussed with this patient, including applicable screening as appropriate for cardiovascular disease, diabetes, osteopenia/osteoporosis, and glaucoma.  As appropriate for age/gender, discussed screening for colorectal cancer, prostate cancer, breast cancer, and cervical cancer. Checklist reviewing preventive services available has been given to the " patient.    Reviewed patients plan of care and provided an AVS. The Intermediate Care Plan ( asthma action plan, low back pain action plan, and migraine action plan) for Payton meets the Care Plan requirement. This Care Plan has been established and reviewed with the Patient.    Counseling Resources:  ATP IV Guidelines  Pooled Cohorts Equation Calculator  Breast Cancer Risk Calculator  Breast Cancer: Medication to Reduce Risk  FRAX Risk Assessment  ICSI Preventive Guidelines  Dietary Guidelines for Americans, 2010  [employer]'s MyPlate  ASA Prophylaxis  Lung CA Screening    Araseli Frazier DO  [facility]    Identified Health Risks:    She is at risk for falling and has been provided with information to reduce the risk of falling at home.

## 2022-10-17 NOTE — PATIENT INSTRUCTIONS
Preventive Health Recommendations    See your health care provider every year to  Review health changes.   Discuss preventive care.    Review your medicines if your doctor has prescribed any.  You no longer need a yearly Pap test unless you've had an abnormal Pap test in the past 10 years. If you have vaginal symptoms, such as bleeding or discharge, be sure to talk with your provider about a Pap test.  Every 1 to 2 years, have a mammogram.  If you are over 69, talk with your health care provider about whether or not you want to continue having screening mammograms.  Every 10 years, have a colonoscopy. Or, have a yearly FIT test (stool test). These exams will check for colon cancer.   Have a cholesterol test every 5 years, or more often if your doctor advises it.   Have a diabetes test (fasting glucose) every three years. If you are at risk for diabetes, you should have this test more often.   At age 65, have a bone density scan (DEXA) to check for osteoporosis (brittle bone disease).    Shots:  Get a flu shot each year.  Get a tetanus shot every 10 years.  Talk to your doctor about your pneumonia vaccines. There are now two you should receive - Pneumovax (PPSV 23) and Prevnar (PCV 13).  Talk to your pharmacist about the shingles vaccine.  Talk to your doctor about the hepatitis B vaccine.    Nutrition:   Eat at least 5 servings of fruits and vegetables each day.  Eat whole-grain bread, whole-wheat pasta and brown rice instead of white grains and rice.  Get adequate Calcium and Vitamin D.     Lifestyle  Exercise at least 150 minutes a week (30 minutes a day, 5 days a week). This will help you control your weight and prevent disease.  Limit alcohol to one drink per day.  No smoking.   Wear sunscreen to prevent skin cancer.   See your dentist twice a year for an exam and cleaning.  See your eye doctor every 1 to 2 years to screen for conditions such as glaucoma, macular degeneration and cataracts.      Preventing  Falls at Home  A person can fall for many reasons. Older adults may fall because reaction time slows down as we age. Your muscles and joints may get stiff, weak, or less flexible because of illness, medicines, or a physical condition.   Other health problems that make falls more likely include:   Arthritis  Dizziness or lightheadedness when you stand up (orthostatic hypotension)  History of a stroke  Dizziness  Anemia  Certain medicines taken for mental illness or to control blood pressure.  Problems with balance or gait  Bladder or urinary problems  History of falling  Changes in vision (vision impairment)  Changes in thinking skills and memory (cognitive impairment)  Injuries from a fall can include serious injuries such as broken bones, dislocated joints, internal bleeding and cuts. Injuries like these can limit your independence.   Prevention tips  To help prevent falls and fall-related injuries, follow the tips below.    Floors  To make floors safer:   Put nonskid pads under area rugs.  Remove small rugs.  Replace worn floor coverings.  Tack carpets firmly to each step on carpeted stairs. Put nonskid strips on the edges of uncarpeted stairs.  Keep floors and stairs free of clutter and cords.  Arrange furniture so there are clear pathways.  Clean up any spills right away.  Bathrooms    To make bathrooms safer:   Install grab bars in the tub or shower.  Apply nonskid strips or put a nonskid rubber mat in the tub or shower.  Sit on a bath chair to bathe.  Use bathmats with nonskid backing.  Lighting  To improve visibility in your home:    Keep a flashlight in each room. Or put a lamp next to the bed within easy reach.  Put nightlights in the bedrooms, hallways, kitchen, and bathrooms.  Make sure all stairways have good lighting.  Take your time when going up and down stairs.  Put handrails on both sides of stairs and in walkways for more support. To prevent injury to your wrist or arm, don t use handrails to pull  yourself up.  Install grab bars to pull yourself up.  Move or rearrange items that you use often. This will make them easier to find or reach.  Look at your home to find any safety hazards. Especially look at doorways, walkways, and the driveway. Remove or repair any safety problems that you find.  Other changes to make  Look around to find any safety hazards. Look closely at doorways, walkways, and the driveway. Remove or repair any safety problems that you find.  Wear shoes that fit well.  Take your time when going up and down stairs.  Put handrails on both sides of stairs and in walkways for more support. To prevent injury to your wrist or arm, don t use handrails to pull yourself up.  Install grab bars wherever needed to pull yourself up.  Arrange items that you use often. This will make them easier to find or reach.    Rowena last reviewed this educational content on 3/1/2020    0157-0778 [employer]. All rights reserved. This information is not intended as a substitute for professional medical care. Always follow your healthcare professional's instructions.

## 2022-10-17 NOTE — NURSING NOTE
"Initial /74   Pulse 68   Temp 97.5  F (36.4  C) (Tympanic)   Ht 1.619 m (5' 3.75\")   Wt 91.3 kg (201 lb 3.2 oz)   SpO2 98%   BMI 34.81 kg/m   Estimated body mass index is 34.81 kg/m  as calculated from the following:    Height as of this encounter: 1.619 m (5' 3.75\").    Weight as of this encounter: 91.3 kg (201 lb 3.2 oz). .      "

## 2022-10-24 ENCOUNTER — OFFICE VISIT (OUTPATIENT)
Dept: PHYSICAL MEDICINE AND REHAB | Facility: CLINIC | Age: 72
End: 2022-10-24
Payer: COMMERCIAL

## 2022-10-24 VITALS — DIASTOLIC BLOOD PRESSURE: 74 MMHG | SYSTOLIC BLOOD PRESSURE: 142 MMHG | OXYGEN SATURATION: 96 % | HEART RATE: 66 BPM

## 2022-10-24 DIAGNOSIS — M79.18 MYOFASCIAL PAIN: ICD-10-CM

## 2022-10-24 DIAGNOSIS — M70.62 GREATER TROCHANTERIC BURSITIS OF BOTH HIPS: Primary | ICD-10-CM

## 2022-10-24 DIAGNOSIS — M70.61 GREATER TROCHANTERIC BURSITIS OF BOTH HIPS: Primary | ICD-10-CM

## 2022-10-24 PROCEDURE — 99213 OFFICE O/P EST LOW 20 MIN: CPT | Performed by: PAIN MEDICINE

## 2022-10-24 ASSESSMENT — PAIN SCALES - GENERAL: PAINLEVEL: NO PAIN (1)

## 2022-10-24 NOTE — LETTER
10/24/2022         RE: Payton Gardiner  [address] [place] [address]        Dear Colleague,    Thank you for referring your patient, Payton Gardiner, to the [facility]. Please see a copy of my visit note below.      Assessment:     Diagnoses and all orders for this visit:  Greater trochanteric bursitis of both hips  Myofascial pain     Payton Gardiner is a 72 year old y.o. female with past medical history significant for right greater trochanter bursitis, low back pain, right hip pain, vitamin D deficiency, obesity, hyperlipidemia, hypertension, osteoporosis, patellofemoral syndrome, sprain of wrist, bilateral cataracts, postmenopausal, abnormal gait who presents today for follow-up regarding bilateral lateral thigh pain:    -Patient notes that she is doing much better after bilateral greater trochanter bursa injections.  She does have mild right buttock/lateral thigh pain that remains, however again much better than it was.  She reports roughly 85 to 90% relief.     Plan:     A shared decision making plan was used. The patient's values and choices were respected. Prior medical records from our last visit on 9/23/2022 were reviewed today. The following represents what was discussed and decided upon by the provider and the patient.        -DIAGNOSTIC TESTS: Images were personally reviewed and interpreted.   --X-ray of pelvis and hips dated 2/11/2022 is personally viewed images interpreted and discussed with the patient.  There is no acute displaced right hip fracture or significant hip joint space narrowing for age.  There is bilateral acetabular over coverage/coxa profunda.  There is no acute pelvic fracture.  There are degenerative changes in the lumbar spine.  -- Bone density scan on 6/1/2017 report is reviewed and shows osteopenia.    -INTERVENTIONS: No interventions at this time.    -MEDICATIONS: No changes to medications.  -  Discussed side effects of medications and proper  use. Patient verbalized understanding.    -PHYSICAL THERAPY: Recommend she continue with home exercises.       -PATIENT EDUCATION: We discussed pain management in a multimodal fashion including physical therapy, medication management, possible future injections.    -FOLLOW UP: Patient will follow up as needed.  Advised to contact clinic if symptoms worsen or change.    Subjective:     Payton Gardiner is a 72 year old female who presents today for follow-up regarding bilateral lateral thigh pain.  Patient notes 85 to 90% relief with her injections.  She feels much better, however there are 3 things that continue to give her some pain on the right buttock and lateral thigh area.  These 3 things include going up and down stairs, crawling into bed and doing a specific physical therapy exercises.  Other than that her pain is 1/10.  She is currently not taking any medications for pain.  She does note that since she started going back on the treadmill that her balance seems to be off.  She holds on with at least one hand and finds it this is helpful.  She is pleased that she is doing better.  She is denying any bowel or bladder changes, fevers, chills, unintentional weight loss.    -Treatment to Date: X-ray of pelvis and hips dated 2/11/2022.  Bone density scan dated 6/1/2017.  Right greater trochanter bursa injection on 2/11/2022.  Bilateral greater trochanter injections under ultrasound guidance on 9/8/2022.    Patient Active Problem List   Diagnosis     Cataracts, bilateral     Alopecia areata     Breast cyst, left     Fibrocystic breast changes     Abnormal mammogram     Greater trochanteric bursitis, right     HCD (health care directive)     Hyperlipidemia     Low back pain     Osteoporosis     Patellofemoral syndrome     Postmenopausal     Sprain of wrist     Vitamin D deficiency     Abnormal glucose     Essential hypertension with goal blood pressure less than 140/90     Hip pain, right     Abnormal gait       Current  Outpatient Medications   Medication     Calcium Carb-Cholecalciferol (CALCIUM 600 + D PO)     Cholecalciferol (VITAMIN D) 2000 UNITS CAPS     FIBER PO     hydrochlorothiazide (HYDRODIURIL) 12.5 MG tablet     meloxicam (MOBIC) 15 MG tablet     vitamin B complex with vitamin C (STRESS TAB) tablet     Current Facility-Administered Medications   Medication     lidocaine 1 % injection 2 mL     ropivacaine (NAROPIN) injection 3 mL     triamcinolone (KENALOG-40) injection 40 mg       Allergies   Allergen Reactions     Hmg-Coa-R Inhibitors      Could hardly walk with STATINS     Adhesive Tape Rash     Macrodantin [Nitrofurantoin] Rash     Nickel Rash     Sulfa Drugs Rash       No past medical history on file.     Review of Systems  ROS:  Specifically negative for bowel/bladder dysfunction, balance changes, headache, dizziness, foot drop, fevers, chills, appetite changes, nausea/vomiting, unexplained weight loss. Otherwise 13 systems reviewed are negative. Please see the patient's intake questionnaire from today for details.    Reviewed Social, Family, Past Medical and Past Surgical history with patient, no significant changes noted since prior visit.     Objective:     BP (!) 142/74   Pulse 66   SpO2 96%     PHYSICAL EXAMINATION:    --CONSTITUTIONAL: Well developed, well nourished, healthy appearing individual.  --PSYCHIATRIC: Appropriate mood and affect. No difficulty interacting due to temper, social withdrawal, or memory issues.  --RESPIRATORY: Normal rhythm and effort. No abnormal accessory muscle breathing patterns noted.   --MUSCULOSKELETAL:  Normal lumbar lordosis noted, no lateral shift.  --GROSS MOTOR: Easily arises from a seated position. Gait is non-antalgic  --LUMBAR SPINE:  Inspection reveals no evidence of deformity. Range of motion is mildly limited in lumbar flexion, extension, lateral rotation.  Tenderness palpation over the right low back and lateral thigh.  Straight leg raising in the seated position is  negative to radicular pain.   --SACROILIAC JOINT: One Finger point test negative.  --LOWER EXTREMITY MOTOR TESTING:  Plantar flexion left 5/5, right 5/5   Dorsiflexion left 5/5, right 5/5   Great toe MTP extension left 5/5, right 5/5  Knee flexion left 5/5, right 5/5  Knee extension left 5/5, right 5/5   Hip flexion left 5/5, right 5/5  Hip abduction left 5/5, right 5/5  Hip adduction left 5/5, right 5/5   --NEUROLOGIC:  Sensation to light touch is intact in the bilateral L4, L5, and S1 dermatomes.    RESULTS:   Imaging: Lumbar spine imaging was reviewed today. The images were shown to the patient and the findings were explained using a spine model.    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 Continued follow-up is recommended. No new abnormality. Minimal marginal spurring of patella. No effusion. SENA MURGUIA MD   SYSTEM ID:  [id number]                              Again, thank you for allowing me to participate in the care of your patient.        Sincerely,        Freddie Alejandra, DO

## 2022-10-24 NOTE — PATIENT INSTRUCTIONS
Recommend that you do your home exercises except for the one that causes pain.    ~Please call Nurse Navigation line [phone] with any questions or concerns about your treatment plan, if symptoms worsen and you would like to be seen urgently, or if you have problems controlling bladder and bowel function.

## 2022-10-24 NOTE — PROGRESS NOTES
Assessment:     Diagnoses and all orders for this visit:  Greater trochanteric bursitis of both hips  Myofascial pain     Payton Gardiner is a 72 year old y.o. female with past medical history significant for right greater trochanter bursitis, low back pain, right hip pain, vitamin D deficiency, obesity, hyperlipidemia, hypertension, osteoporosis, patellofemoral syndrome, sprain of wrist, bilateral cataracts, postmenopausal, abnormal gait who presents today for follow-up regarding bilateral lateral thigh pain:    -Patient notes that she is doing much better after bilateral greater trochanter bursa injections.  She does have mild right buttock/lateral thigh pain that remains, however again much better than it was.  She reports roughly 85 to 90% relief.     Plan:     A shared decision making plan was used. The patient's values and choices were respected. Prior medical records from our last visit on 9/23/2022 were reviewed today. The following represents what was discussed and decided upon by the provider and the patient.        -DIAGNOSTIC TESTS: Images were personally reviewed and interpreted.   --X-ray of pelvis and hips dated 2/11/2022 is personally viewed images interpreted and discussed with the patient.  There is no acute displaced right hip fracture or significant hip joint space narrowing for age.  There is bilateral acetabular over coverage/coxa profunda.  There is no acute pelvic fracture.  There are degenerative changes in the lumbar spine.  -- Bone density scan on 6/1/2017 report is reviewed and shows osteopenia.    -INTERVENTIONS: No interventions at this time.    -MEDICATIONS: No changes to medications.  -  Discussed side effects of medications and proper use. Patient verbalized understanding.    -PHYSICAL THERAPY: Recommend she continue with home exercises.       -PATIENT EDUCATION: We discussed pain management in a multimodal fashion including physical therapy, medication management, possible future  injections.    -FOLLOW UP: Patient will follow up as needed.  Advised to contact clinic if symptoms worsen or change.    Subjective:     Payton Gardiner is a 72 year old female who presents today for follow-up regarding bilateral lateral thigh pain.  Patient notes 85 to 90% relief with her injections.  She feels much better, however there are 3 things that continue to give her some pain on the right buttock and lateral thigh area.  These 3 things include going up and down stairs, crawling into bed and doing a specific physical therapy exercises.  Other than that her pain is 1/10.  She is currently not taking any medications for pain.  She does note that since she started going back on the treadmill that her balance seems to be off.  She holds on with at least one hand and finds it this is helpful.  She is pleased that she is doing better.  She is denying any bowel or bladder changes, fevers, chills, unintentional weight loss.    -Treatment to Date: X-ray of pelvis and hips dated 2/11/2022.  Bone density scan dated 6/1/2017.  Right greater trochanter bursa injection on 2/11/2022.  Bilateral greater trochanter injections under ultrasound guidance on 9/8/2022.    Patient Active Problem List   Diagnosis     Cataracts, bilateral     Alopecia areata     Breast cyst, left     Fibrocystic breast changes     Abnormal mammogram     Greater trochanteric bursitis, right     HCD (health care directive)     Hyperlipidemia     Low back pain     Osteoporosis     Patellofemoral syndrome     Postmenopausal     Sprain of wrist     Vitamin D deficiency     Abnormal glucose     Essential hypertension with goal blood pressure less than 140/90     Hip pain, right     Abnormal gait       Current Outpatient Medications   Medication     Calcium Carb-Cholecalciferol (CALCIUM 600 + D PO)     Cholecalciferol (VITAMIN D) 2000 UNITS CAPS     FIBER PO     hydrochlorothiazide (HYDRODIURIL) 12.5 MG tablet     meloxicam (MOBIC) 15 MG tablet     vitamin  B complex with vitamin C (STRESS TAB) tablet     Current Facility-Administered Medications   Medication     lidocaine 1 % injection 2 mL     ropivacaine (NAROPIN) injection 3 mL     triamcinolone (KENALOG-40) injection 40 mg       Allergies   Allergen Reactions     Hmg-Coa-R Inhibitors      Could hardly walk with STATINS     Adhesive Tape Rash     Macrodantin [Nitrofurantoin] Rash     Nickel Rash     Sulfa Drugs Rash       No past medical history on file.     Review of Systems  ROS:  Specifically negative for bowel/bladder dysfunction, balance changes, headache, dizziness, foot drop, fevers, chills, appetite changes, nausea/vomiting, unexplained weight loss. Otherwise 13 systems reviewed are negative. Please see the patient's intake questionnaire from today for details.    Reviewed Social, Family, Past Medical and Past Surgical history with patient, no significant changes noted since prior visit.     Objective:     BP (!) 142/74   Pulse 66   SpO2 96%     PHYSICAL EXAMINATION:    --CONSTITUTIONAL: Well developed, well nourished, healthy appearing individual.  --PSYCHIATRIC: Appropriate mood and affect. No difficulty interacting due to temper, social withdrawal, or memory issues.  --RESPIRATORY: Normal rhythm and effort. No abnormal accessory muscle breathing patterns noted.   --MUSCULOSKELETAL:  Normal lumbar lordosis noted, no lateral shift.  --GROSS MOTOR: Easily arises from a seated position. Gait is non-antalgic  --LUMBAR SPINE:  Inspection reveals no evidence of deformity. Range of motion is mildly limited in lumbar flexion, extension, lateral rotation.  Tenderness palpation over the right low back and lateral thigh.  Straight leg raising in the seated position is negative to radicular pain.   --SACROILIAC JOINT: One Finger point test negative.  --LOWER EXTREMITY MOTOR TESTING:  Plantar flexion left 5/5, right 5/5   Dorsiflexion left 5/5, right 5/5   Great toe MTP extension left 5/5, right 5/5  Knee flexion  left 5/5, right 5/5  Knee extension left 5/5, right 5/5   Hip flexion left 5/5, right 5/5  Hip abduction left 5/5, right 5/5  Hip adduction left 5/5, right 5/5   --NEUROLOGIC:  Sensation to light touch is intact in the bilateral L4, L5, and S1 dermatomes.    RESULTS:   Imaging: Lumbar spine imaging was reviewed today. The images were shown to the patient and the findings were explained using a spine model.    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 Continued follow-up is recommended. No new abnormality. Minimal marginal spurring of patella. No effusion. SENA MURGUIA MD   SYSTEM ID:  [id number]

## 2022-11-14 ENCOUNTER — MYC MEDICAL ADVICE (OUTPATIENT)
Dept: FAMILY MEDICINE | Facility: CLINIC | Age: 72
End: 2022-11-14

## 2022-11-14 NOTE — TELEPHONE ENCOUNTER
Call placed to patient regarding my chart message,scheduled visit with Dr Frazier 11/22/22 as patient did not want to see sameday provider.Stefany Ordonez RN

## 2022-11-22 ENCOUNTER — OFFICE VISIT (OUTPATIENT)
Dept: FAMILY MEDICINE | Facility: CLINIC | Age: 72
End: 2022-11-22
Payer: COMMERCIAL

## 2022-11-22 VITALS
HEART RATE: 82 BPM | SYSTOLIC BLOOD PRESSURE: 138 MMHG | WEIGHT: 202.6 LBS | OXYGEN SATURATION: 98 % | TEMPERATURE: 98 F | BODY MASS INDEX: 35.05 KG/M2 | DIASTOLIC BLOOD PRESSURE: 74 MMHG

## 2022-11-22 DIAGNOSIS — K13.0 ALLERGIC CONTACT CHEILITIS: Primary | ICD-10-CM

## 2022-11-22 PROCEDURE — 99213 OFFICE O/P EST LOW 20 MIN: CPT | Performed by: FAMILY MEDICINE

## 2022-11-22 RX ORDER — DESONIDE 0.5 MG/G
CREAM TOPICAL 2 TIMES DAILY
Qty: 60 G | Refills: 0 | Status: SHIPPED | OUTPATIENT
Start: 2022-11-22 | End: 2022-11-29

## 2022-11-22 NOTE — PATIENT INSTRUCTIONS
This is most likely due to allergy to one of your lip products.  I would recommend stopping all of your lip products now.  We'll begin a steroid cream twice daily.  You can apply just a think layer twice daily for 7 days.      I would also recommend trying Vaseline throughout the day and after applying the steroid to moisturize the lips.      Once your symptoms are completely resolved you can consider adding back your lip products one at a time to see if you can determine what the allergy was to.

## 2022-11-22 NOTE — NURSING NOTE
"Initial /74   Pulse 82   Temp 98  F (36.7  C) (Tympanic)   Wt 91.9 kg (202 lb 9.6 oz)   SpO2 98%   BMI 35.05 kg/m   Estimated body mass index is 35.05 kg/m  as calculated from the following:    Height as of 10/17/22: 1.619 m (5' 3.75\").    Weight as of this encounter: 91.9 kg (202 lb 9.6 oz). .      "

## 2022-11-22 NOTE — PROGRESS NOTES
A/P:      ICD-10-CM    1. Allergic contact cheilitis  K13.0 desonide (DESOWEN) 0.05 % external cream        Patient Instructions   This is most likely due to allergy to one of your lip products.  I would recommend stopping all of your lip products now.  We'll begin a steroid cream twice daily.  You can apply just a think layer twice daily for 7 days.      I would also recommend trying Vaseline throughout the day and after applying the steroid to moisturize the lips.      Once your symptoms are completely resolved you can consider adding back your lip products one at a time to see if you can determine what the allergy was to.            Phylicia Cain is a 72 year old, presenting for the following health issues:  Mouth/Lip Problem      History of Present Illness       Reason for visit:  Chapped lips since September  Symptom onset:  More than a month  Symptoms include:  Chapped, dry, sore li;s  Symptom intensity:  Moderate  Symptom progression:  Staying the same  Had these symptoms before:  NoShe consumes 0 sweetened beverage(s) daily.She exercises with enough effort to increase her heart rate 20 to 29 minutes per day.  She exercises with enough effort to increase her heart rate 3 or less days per week.   She is taking medications regularly.       Has been struggling with sore chapped lips.  Has tried a number of different products.  Lots of Aquaphor.  Wears lipstick regularly.  No new products noted.      Review of Systems   Constitutional, HEENT, cardiovascular, pulmonary, gi and gu systems are negative, except as otherwise noted.      Objective    /74   Pulse 82   Temp 98  F (36.7  C) (Tympanic)   Wt 91.9 kg (202 lb 9.6 oz)   SpO2 98%   BMI 35.05 kg/m    Body mass index is 35.05 kg/m .  Physical Exam   PE:  VS as above   Gen:  WN/WD/WH female in NAD   Skin:  Lips mildly erythematous with peeling throughout    Epic reviewed

## 2022-11-23 ENCOUNTER — TELEPHONE (OUTPATIENT)
Dept: FAMILY MEDICINE | Facility: CLINIC | Age: 72
End: 2022-11-23

## 2022-11-23 DIAGNOSIS — K13.0 ALLERGIC CONTACT CHEILITIS: Primary | ICD-10-CM

## 2022-11-23 RX ORDER — HYDROCORTISONE VALERATE CREAM 2 MG/G
CREAM TOPICAL 2 TIMES DAILY
Qty: 45 G | Refills: 0 | Status: SHIPPED | OUTPATIENT
Start: 2022-11-23 | End: 2022-11-30

## 2022-11-23 NOTE — TELEPHONE ENCOUNTER
Dr. Frazier:    Patient calls the clinic today to ask if you would prescribe an alternative to the Desonide as it costs more than 100$, pharmacy told her to reach out to PCP to see if there is a cheaper option, please advise.      ROHAN Quinones

## 2023-03-06 ENCOUNTER — HOSPITAL ENCOUNTER (OUTPATIENT)
Dept: BONE DENSITY | Facility: HOSPITAL | Age: 73
Discharge: HOME | End: 2023-03-06
Attending: FAMILY MEDICINE | Admitting: FAMILY MEDICINE
Payer: COMMERCIAL

## 2023-03-06 DIAGNOSIS — Z78.0 MENOPAUSE: ICD-10-CM

## 2023-03-06 PROCEDURE — 77081 DXA BONE DENSITY APPENDICULR: CPT

## 2023-03-06 PROCEDURE — 77080 DXA BONE DENSITY AXIAL: CPT

## 2023-04-07 ENCOUNTER — OFFICE VISIT (OUTPATIENT)
Dept: PHYSICAL MEDICINE AND REHAB | Facility: CLINIC | Age: 73
End: 2023-04-07
Payer: COMMERCIAL

## 2023-04-07 VITALS — SYSTOLIC BLOOD PRESSURE: 176 MMHG | HEART RATE: 71 BPM | DIASTOLIC BLOOD PRESSURE: 74 MMHG | OXYGEN SATURATION: 93 %

## 2023-04-07 DIAGNOSIS — M79.18 MYOFASCIAL PAIN: ICD-10-CM

## 2023-04-07 DIAGNOSIS — M70.61 GREATER TROCHANTERIC BURSITIS OF BOTH HIPS: Primary | ICD-10-CM

## 2023-04-07 DIAGNOSIS — M70.62 GREATER TROCHANTERIC BURSITIS OF BOTH HIPS: Primary | ICD-10-CM

## 2023-04-07 PROCEDURE — 99214 OFFICE O/P EST MOD 30 MIN: CPT | Performed by: PAIN MEDICINE

## 2023-04-07 RX ORDER — NYSTATIN 100000 U/G
CREAM TOPICAL
COMMUNITY
Start: 2023-03-15

## 2023-04-07 RX ORDER — MELOXICAM 15 MG/1
7.5-15 TABLET ORAL DAILY PRN
Qty: 30 TABLET | Refills: 3 | Status: SHIPPED | OUTPATIENT
Start: 2023-04-07

## 2023-04-07 NOTE — PATIENT INSTRUCTIONS
[facility] Injection Requirements    A  is required for all fluoroscopically-guided injections.  Injection appointments may be cancelled if there are signs/symptoms of an active infection or if the patient is being actively treated with antibiotics for a diagnosed infection.  Patients may have their steroid injection cancelled if they have had another steroid injection within 2 weeks.  Diabetic patients will have their blood glucose levels checked the day of their injection and the appointment will be rescheduled if the blood glucose level is 300 or higher.  Patients with allergies to cortisone, local anesthetics, iodine, or contrast dye should contact the Spine Center to further discuss these considerations.  Patients scheduled for medial branch block diagnostic injections should refrain from taking pain medication the day of the procedure.  The medial branch block injection appointment will be rescheduled if the patient's pain rating is not 5/10 or greater at the time of the procedure.  Patients taking warfarin/Coumadin will have their INR checked the day of the procedure and the procedure may be rescheduled if the INR is greater than 3.0.  Please contact the [facility] Center [phone]) if you are taking any prescription blood-thinning medications (warfarin, Plavix, Lovenox, Eliquis, Brilinta, Effient, etc.) as special dosing adjustments may need to be made depending on the type of injection you are scheduled to receive.  It is recommended that you delay having your steroid injection if you have received any vaccines within 2 weeks.    ~Please call Nurse Navigation line [phone] with any questions or concerns about your treatment plan, if symptoms worsen and you would like to be seen urgently, or if you have problems controlling bladder and bowel function.

## 2023-04-07 NOTE — PROGRESS NOTES
Assessment:     Diagnoses and all orders for this visit:  Greater trochanteric bursitis of both hips  -     meloxicam (MOBIC) 15 MG tablet; Take 0.5-1 tablets (7.5-15 mg) by mouth daily as needed for pain Take with food.  -     PAIN US Large Joint Injection Bilateral; Future  Myofascial pain  -     meloxicam (MOBIC) 15 MG tablet; Take 0.5-1 tablets (7.5-15 mg) by mouth daily as needed for pain Take with food.  -     PAIN US Large Joint Injection Bilateral; Future     Payton Gardiner is a 73 year old y.o. female with past medical history significant for right greater trochanter bursitis, low back pain, right hip pain, vitamin D deficiency, obesity, hyperlipidemia, hypertension, osteoporosis, patellofemoral syndrome, sprain of wrist, bilateral cataracts, postmenopausal, abnormal gait who presents today for follow-up regarding bilateral lateral thigh pain:    -The patient received 70% relief with her greater trochanter bursa injections under ultrasound guidance for 6 weeks.  Her pain is likely secondary to greater trochanter bursitis.     Plan:     A shared decision making plan was used. The patient's values and choices were respected. Prior medical records from our last visit on 10/24/2022 were reviewed today. The following represents what was discussed and decided upon by the provider and the patient.        -DIAGNOSTIC TESTS: Images were personally reviewed and interpreted.   --X-ray of pelvis and hips dated 2/11/2022 is personally viewed images interpreted and discussed with the patient.  There is no acute displaced right hip fracture or significant hip joint space narrowing for age.  There is bilateral acetabular over coverage/coxa profunda.  There is no acute pelvic fracture.  There are degenerative changes in the lumbar spine.  -- Bone density scan on 6/1/2017 report is reviewed and shows osteopenia.  -- If pain is not improved with the injection we will order MRI of lumbar spine.    -INTERVENTIONS: I ordered  repeat bilateral ultrasound-guided greater trochanteric bursa injections.    -MEDICATIONS: No changes to medication.  -  Discussed side effects of medications and proper use. Patient verbalized understanding.    -PHYSICAL THERAPY: I recommend she continue with home exercises on a consistent basis.     -PATIENT EDUCATION: We discussed pain management in a multimodal fashion including physical therapy, medication management, possible future injections.    -FOLLOW UP: Patient will follow up after injections.  Advised to contact clinic if symptoms worsen or change.    Subjective:     Payton Gardiner is a 73 year old female who presents today for follow-up regarding bilateral thigh pain.  Patient notes that she had roughly 6 months of relief with 70% relief from her ultrasound-guided trochanter injections.  She notes now she is able to continue to walk, however she is having difficulty going up and down stairs and will sometimes have to crawl up them.  She also has pain sleeping on either side.  Once again has not been helpful.  Pain today is 7/10.  She denies any bowel or bladder changes, fevers, chills, unintentional weight loss.    -Treatment to Date: X-ray of pelvis and hips dated 2/11/2022.  Bone density scan dated 6/1/2017.  Right greater trochanter bursa injection on 2/11/2022.  Bilateral greater trochanter injections under ultrasound guidance on 9/8/2022.    Patient Active Problem List   Diagnosis     Cataracts, bilateral     Alopecia areata     Breast cyst, left     Fibrocystic breast changes     Abnormal mammogram     Greater trochanteric bursitis, right     HCD (health care directive)     Hyperlipidemia     Low back pain     Osteoporosis     Patellofemoral syndrome     Postmenopausal     Sprain of wrist     Vitamin D deficiency     Abnormal glucose     Essential hypertension with goal blood pressure less than 140/90     Hip pain, right     Abnormal gait       Current Outpatient Medications   Medication      Calcium Carb-Cholecalciferol (CALCIUM 600 + D PO)     Cholecalciferol (VITAMIN D) 2000 UNITS CAPS     FIBER PO     hydrochlorothiazide (HYDRODIURIL) 12.5 MG tablet     meloxicam (MOBIC) 15 MG tablet     nystatin (MYCOSTATIN) 449909 UNIT/GM external cream     vitamin B complex with vitamin C (STRESS TAB) tablet     Current Facility-Administered Medications   Medication     lidocaine 1 % injection 2 mL     ropivacaine (NAROPIN) injection 3 mL     triamcinolone (KENALOG-40) injection 40 mg       Allergies   Allergen Reactions     Hmg-Coa-R Inhibitors      Could hardly walk with STATINS     Other Drug Allergy (See Comments) Muscle Pain (Myalgia)     Could hardly walk with STATINS  All statins      Adhesive Tape Rash     Macrodantin [Nitrofurantoin] Rash     Nickel Rash     Sulfa Drugs Rash       No past medical history on file.     Review of Systems  ROS:  Specifically negative for bowel/bladder dysfunction, balance changes, headache, dizziness, foot drop, fevers, chills, appetite changes, nausea/vomiting, unexplained weight loss. Otherwise 13 systems reviewed are negative. Please see the patient's intake questionnaire from today for details.    Reviewed Social, Family, Past Medical and Past Surgical history with patient, no significant changes noted since prior visit.     Objective:     BP (!) 176/74   Pulse 71   SpO2 93%     PHYSICAL EXAMINATION:    --CONSTITUTIONAL: Well developed, well nourished, healthy appearing individual.  --PSYCHIATRIC: Appropriate mood and affect. No difficulty interacting due to temper, social withdrawal, or memory issues.  --RESPIRATORY: Normal rhythm and effort. No abnormal accessory muscle breathing patterns noted.   --MUSCULOSKELETAL:  Normal lumbar lordosis noted, no lateral shift.  --GROSS MOTOR: Easily arises from a seated position. Gait is non-antalgic  --LUMBAR SPINE:  Inspection reveals no evidence of deformity. Range of motion is mildly limited in lumbar flexion, extension,  lateral rotation. No tenderness to palpation lumbar spine. Straight leg raising in the seated position is negative to radicular pain.   --SACROILIAC JOINT:One Finger point test negative.  --LOWER EXTREMITY MOTOR TESTING:  Plantar flexion left 5/5, right 5/5   Dorsiflexion left 5/5, right 5/5   Great toe MTP extension left 5/5, right 5/5  Knee flexion left 5/5, right 5/5  Knee extension left 5/5, right 5/5   Hip flexion left 5/5, right 5/5  Hip abduction left 5/5, right 5/5  Hip adduction left 5/5, right 5/5   --NEUROLOGIC:  Sensation to light touch is intact in the bilateral L4, L5, and S1 dermatomes.    RESULTS:   Imaging: Lumbar spine imaging was reviewed today.

## 2023-04-07 NOTE — LETTER
4/7/2023         RE: Payton Gardiner  [address] [place] [address]        Dear Colleague,    Thank you for referring your patient, Payton Gardiner, to the [facility]. Please see a copy of my visit note below.      Assessment:     Diagnoses and all orders for this visit:  Greater trochanteric bursitis of both hips  -     meloxicam (MOBIC) 15 MG tablet; Take 0.5-1 tablets (7.5-15 mg) by mouth daily as needed for pain Take with food.  -     PAIN US Large Joint Injection Bilateral; Future  Myofascial pain  -     meloxicam (MOBIC) 15 MG tablet; Take 0.5-1 tablets (7.5-15 mg) by mouth daily as needed for pain Take with food.  -     PAIN US Large Joint Injection Bilateral; Future     Payton Gardiner is a 73 year old y.o. female with past medical history significant for right greater trochanter bursitis, low back pain, right hip pain, vitamin D deficiency, obesity, hyperlipidemia, hypertension, osteoporosis, patellofemoral syndrome, sprain of wrist, bilateral cataracts, postmenopausal, abnormal gait who presents today for follow-up regarding bilateral lateral thigh pain:    -The patient received 70% relief with her greater trochanter bursa injections under ultrasound guidance for 6 weeks.  Her pain is likely secondary to greater trochanter bursitis.     Plan:     A shared decision making plan was used. The patient's values and choices were respected. Prior medical records from our last visit on 10/24/2022 were reviewed today. The following represents what was discussed and decided upon by the provider and the patient.        -DIAGNOSTIC TESTS: Images were personally reviewed and interpreted.   --X-ray of pelvis and hips dated 2/11/2022 is personally viewed images interpreted and discussed with the patient.  There is no acute displaced right hip fracture or significant hip joint space narrowing for age.  There is bilateral acetabular over coverage/coxa profunda.  There is no acute pelvic  fracture.  There are degenerative changes in the lumbar spine.  -- Bone density scan on 6/1/2017 report is reviewed and shows osteopenia.  -- If pain is not improved with the injection we will order MRI of lumbar spine.    -INTERVENTIONS: I ordered repeat bilateral ultrasound-guided greater trochanteric bursa injections.    -MEDICATIONS: No changes to medication.  -  Discussed side effects of medications and proper use. Patient verbalized understanding.    -PHYSICAL THERAPY: I recommend she continue with home exercises on a consistent basis.     -PATIENT EDUCATION: We discussed pain management in a multimodal fashion including physical therapy, medication management, possible future injections.    -FOLLOW UP: Patient will follow up after injections.  Advised to contact clinic if symptoms worsen or change.    Subjective:     Payton Gardiner is a 73 year old female who presents today for follow-up regarding bilateral thigh pain.  Patient notes that she had roughly 6 months of relief with 70% relief from her ultrasound-guided trochanter injections.  She notes now she is able to continue to walk, however she is having difficulty going up and down stairs and will sometimes have to crawl up them.  She also has pain sleeping on either side.  Once again has not been helpful.  Pain today is 7/10.  She denies any bowel or bladder changes, fevers, chills, unintentional weight loss.    -Treatment to Date: X-ray of pelvis and hips dated 2/11/2022.  Bone density scan dated 6/1/2017.  Right greater trochanter bursa injection on 2/11/2022.  Bilateral greater trochanter injections under ultrasound guidance on 9/8/2022.    Patient Active Problem List   Diagnosis     Cataracts, bilateral     Alopecia areata     Breast cyst, left     Fibrocystic breast changes     Abnormal mammogram     Greater trochanteric bursitis, right     HCD (health care directive)     Hyperlipidemia     Low back pain     Osteoporosis     Patellofemoral syndrome      Postmenopausal     Sprain of wrist     Vitamin D deficiency     Abnormal glucose     Essential hypertension with goal blood pressure less than 140/90     Hip pain, right     Abnormal gait       Current Outpatient Medications   Medication     Calcium Carb-Cholecalciferol (CALCIUM 600 + D PO)     Cholecalciferol (VITAMIN D) 2000 UNITS CAPS     FIBER PO     hydrochlorothiazide (HYDRODIURIL) 12.5 MG tablet     meloxicam (MOBIC) 15 MG tablet     nystatin (MYCOSTATIN) 194945 UNIT/GM external cream     vitamin B complex with vitamin C (STRESS TAB) tablet     Current Facility-Administered Medications   Medication     lidocaine 1 % injection 2 mL     ropivacaine (NAROPIN) injection 3 mL     triamcinolone (KENALOG-40) injection 40 mg       Allergies   Allergen Reactions     Hmg-Coa-R Inhibitors      Could hardly walk with STATINS     Other Drug Allergy (See Comments) Muscle Pain (Myalgia)     Could hardly walk with STATINS  All statins      Adhesive Tape Rash     Macrodantin [Nitrofurantoin] Rash     Nickel Rash     Sulfa Drugs Rash       No past medical history on file.     Review of Systems  ROS:  Specifically negative for bowel/bladder dysfunction, balance changes, headache, dizziness, foot drop, fevers, chills, appetite changes, nausea/vomiting, unexplained weight loss. Otherwise 13 systems reviewed are negative. Please see the patient's intake questionnaire from today for details.    Reviewed Social, Family, Past Medical and Past Surgical history with patient, no significant changes noted since prior visit.     Objective:     BP (!) 176/74   Pulse 71   SpO2 93%     PHYSICAL EXAMINATION:    --CONSTITUTIONAL: Well developed, well nourished, healthy appearing individual.  --PSYCHIATRIC: Appropriate mood and affect. No difficulty interacting due to temper, social withdrawal, or memory issues.  --RESPIRATORY: Normal rhythm and effort. No abnormal accessory muscle breathing patterns noted.   --MUSCULOSKELETAL:  Normal  lumbar lordosis noted, no lateral shift.  --GROSS MOTOR: Easily arises from a seated position. Gait is non-antalgic  --LUMBAR SPINE:  Inspection reveals no evidence of deformity. Range of motion is mildly limited in lumbar flexion, extension, lateral rotation. No tenderness to palpation lumbar spine. Straight leg raising in the seated position is negative to radicular pain.   --SACROILIAC JOINT:One Finger point test negative.  --LOWER EXTREMITY MOTOR TESTING:  Plantar flexion left 5/5, right 5/5   Dorsiflexion left 5/5, right 5/5   Great toe MTP extension left 5/5, right 5/5  Knee flexion left 5/5, right 5/5  Knee extension left 5/5, right 5/5   Hip flexion left 5/5, right 5/5  Hip abduction left 5/5, right 5/5  Hip adduction left 5/5, right 5/5   --NEUROLOGIC:  Sensation to light touch is intact in the bilateral L4, L5, and S1 dermatomes.    RESULTS:   Imaging: Lumbar spine imaging was reviewed today.                         Again, thank you for allowing me to participate in the care of your patient.        Sincerely,        Freddie Alejandra, DO

## 2023-04-28 ENCOUNTER — RADIOLOGY INJECTION OFFICE VISIT (OUTPATIENT)
Dept: PHYSICAL MEDICINE AND REHAB | Facility: CLINIC | Age: 73
End: 2023-04-28
Attending: PAIN MEDICINE
Payer: COMMERCIAL

## 2023-04-28 VITALS
DIASTOLIC BLOOD PRESSURE: 72 MMHG | OXYGEN SATURATION: 96 % | SYSTOLIC BLOOD PRESSURE: 168 MMHG | TEMPERATURE: 97.7 F | HEART RATE: 73 BPM

## 2023-04-28 DIAGNOSIS — M79.18 MYOFASCIAL PAIN: ICD-10-CM

## 2023-04-28 DIAGNOSIS — M70.61 GREATER TROCHANTERIC BURSITIS OF BOTH HIPS: ICD-10-CM

## 2023-04-28 DIAGNOSIS — M70.62 GREATER TROCHANTERIC BURSITIS OF BOTH HIPS: ICD-10-CM

## 2023-04-28 PROCEDURE — 20611 DRAIN/INJ JOINT/BURSA W/US: CPT | Mod: 50 | Performed by: PAIN MEDICINE

## 2023-04-28 RX ORDER — LIDOCAINE HYDROCHLORIDE 10 MG/ML
INJECTION, SOLUTION EPIDURAL; INFILTRATION; INTRACAUDAL; PERINEURAL
Status: COMPLETED | OUTPATIENT
Start: 2023-04-28 | End: 2023-04-28

## 2023-04-28 RX ORDER — METHYLPREDNISOLONE ACETATE 40 MG/ML
INJECTION, SUSPENSION INTRA-ARTICULAR; INTRALESIONAL; INTRAMUSCULAR; SOFT TISSUE
Status: COMPLETED | OUTPATIENT
Start: 2023-04-28 | End: 2023-04-28

## 2023-04-28 RX ADMIN — METHYLPREDNISOLONE ACETATE 40 MG: 40 INJECTION, SUSPENSION INTRA-ARTICULAR; INTRALESIONAL; INTRAMUSCULAR; SOFT TISSUE at 11:24

## 2023-04-28 RX ADMIN — LIDOCAINE HYDROCHLORIDE 2 ML: 10 INJECTION, SOLUTION EPIDURAL; INFILTRATION; INTRACAUDAL; PERINEURAL at 11:24

## 2023-04-28 ASSESSMENT — PAIN SCALES - GENERAL: PAINLEVEL: SEVERE PAIN (7)

## 2023-04-28 NOTE — PATIENT INSTRUCTIONS
DISCHARGE INSTRUCTIONS  During office hours (8:00 a.m.- 4:00 p.m.) questions or concerns may be answered  by calling [facility] at  [phone].  Messages received after hours will be returned the following business day.      In the case of an emergency, please dial 911 or seek assistance at the nearest Emergency Room/Urgent Care facility.     You may experience an increase in your symptoms for the first 2 days (It may take anywhere between 2 days- 2 weeks for the steroid to have maximum effect).    You may use ice on the injection site, as frequently as 20 minutes each hour if needed.    You may take your pain medicine.    You may shower. No swimming, tub bath or hot tub for 48 hours.  You may remove your bandaid/bandage as soon as you are home.    You may resume light activities, as tolerated.    Resume your usual diet as tolerated.    POSSIBLE STEROID SIDE EFFECTS (If steroid/cortisone was used for your procedure)  -If you experience these symptoms, it should only last for a short period  Swelling of the legs              Skin redness (flushing)     Mouth (oral) irritation   Blood sugar (glucose) levels            Sweats                    Mood changes  Headache  Sleeplessness  Weakened immune system for up to 14 days, which could increase the risk of thomas the COVID-19 virus and/or experiencing more severe symptoms of the disease, if exposed.  Decreased effectiveness of the flu vaccine if given within 2 weeks of the steroid.         POSSIBLE PROCEDURE SIDE EFFECTS  -Call the Sp[facility] Center if you are concerned  Increased Pain           Increased numbness/tingling      Nausea/Vomiting          Bruising/bleeding at site      Redness or swelling                                              Difficulty walking      Weakness           Fever greater than 100.5

## 2023-09-11 ENCOUNTER — MYC MEDICAL ADVICE (OUTPATIENT)
Dept: FAMILY MEDICINE | Facility: CLINIC | Age: 73
End: 2023-09-11
Payer: COMMERCIAL

## 2023-09-16 DIAGNOSIS — I10 ESSENTIAL HYPERTENSION WITH GOAL BLOOD PRESSURE LESS THAN 140/90: ICD-10-CM

## 2023-09-18 RX ORDER — HYDROCHLOROTHIAZIDE 12.5 MG/1
12.5 TABLET ORAL DAILY
Qty: 90 TABLET | Refills: 3 | OUTPATIENT
Start: 2023-09-18

## 2023-09-18 NOTE — TELEPHONE ENCOUNTER
Patient sent a message back saying she does not need the refill. She has some pills until she can see her new provider.   Suzie Hargrove RN on 9/18/2023 at 3:26 PM

## 2023-09-18 NOTE — TELEPHONE ENCOUNTER
Pt has moved.  Please give her a call.  Has she already established with her new provider?  If so script should got to her.  If not I can fill 90 days to allow her to establish    Dr. Araseli Frazier, DO

## 2023-12-16 ENCOUNTER — HEALTH MAINTENANCE LETTER (OUTPATIENT)
Age: 73
End: 2023-12-16

## 2025-01-12 ENCOUNTER — HEALTH MAINTENANCE LETTER (OUTPATIENT)
Age: 75
End: 2025-01-12